# Patient Record
Sex: FEMALE | Race: WHITE | NOT HISPANIC OR LATINO | Employment: OTHER | URBAN - METROPOLITAN AREA
[De-identification: names, ages, dates, MRNs, and addresses within clinical notes are randomized per-mention and may not be internally consistent; named-entity substitution may affect disease eponyms.]

---

## 2017-03-02 ENCOUNTER — VIRTUAL VISIT (OUTPATIENT)
Dept: FAMILY MEDICINE | Facility: OTHER | Age: 32
End: 2017-03-02

## 2017-03-02 NOTE — PROGRESS NOTES
Date:   Clinician: Tre Leiva  Clinician NPI: [id number]  Patient: Petra Vega  Patient : 1985  Patient Address: [facility] [place] [address]  Patient Phone: [phone]  Visit Protocol: Yeast infection  Patient Summary:  Petra is a 31 year old ( : 1985 ) female who initiated a Zip for a presumed vaginal yeast infection.     0-5 days ago she began noticing vaginal discharge, vaginal pruritus, and perivulvar pruritus .   She denies having perivulvar rash, fever, and abdominal pain or lesions.  She has had one (1) occurrence in the past year and the current symptoms are similar to previous yeast infections. She has not tried to treat her current symptoms with any medication.   She has a more than normal amount of chunky (like cottage cheese), clear or white, thick, non-odorous discharge.   She is currently taking or has taken antibiotics in the past 2 weeks. She prefers a fluconazole (Diflucan) Pill.   She states she is not pregnant and denies breastfeeding. She has menstruated in the past month.   She denies risk factors for sexually transmitted infections. She does NOT smoke or use smokeless tobacco.    MEDICATIONS:   Ibuprofen (Advil, Motrin)   , ALLERGIES:    Patient free text response: Wellbuttrin    Clinician Response:  Dear Petra,  Based on the information you have provided, you likely have a vaginal yeast infection which is a common infection of the vagina caused by a fungus.   I am prescribing:   Fluconazole (Diflucan) 150 mg oral tablet to treat your yeast infection. Swallow one (1) tablet as a single dose. There are no refills with this prescription.   While you have yeast infection symptoms, do the following:      Avoid irritants such as scented bath products, tampons, pads, or vaginal sprays and powders.    Avoid douching.    Wear cotton underwear and if you are comfortable doing so, do not wear underwear to bed.    Avoid hot tubs and  raj dumas.     Most women notice improvement in their symptoms within 1-2 days after starting treatment with complete clearing in 5-7 days. If your symptoms have not improved in 3 days or not resolved in 10 days, please schedule an appointment to see your primary care clinician for an evaluation as your symptoms may be caused by an infection other than yeast. Sometimes yeast infections can be associated with other vaginal infections or with sexually transmitted infections (STIs). If you think you may be at risk for a STI please be seen in a clinic.   Diagnosis: Candida Vulvovaginitis  Diagnosis ICD: B37.3  Prescription: fluconazole (Diflucan) 150mg oral tablet 1 tablet, 1 days supply. Take one tablet by mouth one time a day for 1 day. Refills: 0, Refill as needed: no, Allow substitutions: yes  Prescription Sent At: March 02 08:08:58, 2017  Pharmacy: [id number] - [phone] - [address] [place] [address]

## 2017-03-14 ENCOUNTER — TELEPHONE (OUTPATIENT)
Dept: FAMILY MEDICINE | Facility: CLINIC | Age: 32
End: 2017-03-14

## 2017-03-14 DIAGNOSIS — N30.01 ACUTE CYSTITIS WITH HEMATURIA: Primary | ICD-10-CM

## 2017-03-14 RX ORDER — CIPROFLOXACIN 500 MG/1
500 TABLET, FILM COATED ORAL 2 TIMES DAILY
Qty: 6 TABLET | Refills: 0 | Status: SHIPPED | OUTPATIENT
Start: 2017-03-14 | End: 2017-04-06

## 2017-03-14 NOTE — TELEPHONE ENCOUNTER
Disc-will consider allergic to septra. [facility] is where she went. Will switch to cipro if the 2 doses aren't enough, but they may be

## 2017-03-14 NOTE — TELEPHONE ENCOUNTER
MP  Please advise in SN's absence  Per pt treated by KENNY morfin but no record found  Took 2 doses of Bactrim for UTI, after first dose last night having episodes of anxiety and felt like tongue was swelling  Stopped taking med.  Did you want to DB her this evening or do you want to send in different med?  Please advise.  Iram Alarcon RN

## 2017-03-14 NOTE — TELEPHONE ENCOUNTER
Reason for Call: Side effect    Detailed comments: Pt believes it is from bactrim which was prescribed   By Dr. Leiva through virtual visit.   Pt wants to know if there is something that she can take that is non  sulfate    Phone Number Patient can be reached at: Home number on file [phone] (home)    Best Time: anytime    Can we leave a detailed message on this number? YES    Call taken on 3/14/2017 at 2:56 PM by Shanique Mao

## 2017-04-06 ENCOUNTER — OFFICE VISIT (OUTPATIENT)
Dept: FAMILY MEDICINE | Facility: CLINIC | Age: 32
End: 2017-04-06
Payer: COMMERCIAL

## 2017-04-06 VITALS
HEART RATE: 81 BPM | BODY MASS INDEX: 23.34 KG/M2 | HEIGHT: 65 IN | SYSTOLIC BLOOD PRESSURE: 110 MMHG | OXYGEN SATURATION: 97 % | WEIGHT: 140.06 LBS | TEMPERATURE: 98.2 F | DIASTOLIC BLOOD PRESSURE: 70 MMHG

## 2017-04-06 DIAGNOSIS — G47.01 INSOMNIA DUE TO MEDICAL CONDITION: ICD-10-CM

## 2017-04-06 DIAGNOSIS — F41.1 GENERALIZED ANXIETY DISORDER: Primary | ICD-10-CM

## 2017-04-06 PROBLEM — F17.200 TOBACCO USE DISORDER: Status: ACTIVE | Noted: 2017-04-06

## 2017-04-06 PROCEDURE — 99214 OFFICE O/P EST MOD 30 MIN: CPT | Performed by: PHYSICIAN ASSISTANT

## 2017-04-06 RX ORDER — HYDROXYZINE HYDROCHLORIDE 25 MG/1
25-50 TABLET, FILM COATED ORAL
Qty: 30 TABLET | Refills: 3 | Status: SHIPPED | OUTPATIENT
Start: 2017-04-06 | End: 2021-06-25

## 2017-04-06 RX ORDER — ZOLPIDEM TARTRATE 5 MG/1
5 TABLET ORAL
Qty: 30 TABLET | Refills: 0 | Status: SHIPPED | OUTPATIENT
Start: 2017-04-06 | End: 2017-04-18

## 2017-04-06 NOTE — NURSING NOTE
"No chief complaint on file.    /70 (BP Location: Left arm, Patient Position: Left side, Cuff Size: Adult Regular)  Pulse 81  Temp 98.2  F (36.8  C) (Oral)  Ht 5' 5\" (1.651 m)  Wt 140 lb 1 oz (63.5 kg)  LMP 04/04/2017  SpO2 97%  Breastfeeding? No  BMI 23.31 kg/m2 Estimated body mass index is 23.31 kg/(m^2) as calculated from the following:    Height as of this encounter: 5' 5\" (1.651 m).    Weight as of this encounter: 140 lb 1 oz (63.5 kg).  bp completed using cuff size: regular      Health Maintenance addressed:  Pap Smear    Patient is aware to make px with pap                "

## 2017-04-06 NOTE — MR AVS SNAPSHOT
"              After Visit Summary   2017    Petra Vega    MRN: [id number]           Patient Information     Date Of Birth          1985        Visit Information        Provider Department      2017 1:00 PM Josemanuel Amato PA-C [facility]        Today's Diagnoses     Generalized anxiety disorder    -  1    Insomnia due to medical condition           Follow-ups after your visit        Who to contact     If you have questions or need follow up information about today's clinic visit or your schedule please contact [facility] directly at [phone].  Normal or non-critical lab and imaging results will be communicated to you by "[employer]hart, letter or phone within 4 business days after the clinic has received the results. If you do not hear from us within 7 days, please contact the clinic through "[employer]hart or phone. If you have a critical or abnormal lab result, we will notify you by phone as soon as possible.  Submit refill requests through [employer] or call your pharmacy and they will forward the refill request to us. Please allow 3 business days for your refill to be completed.          Additional Information About Your Visit        MyChart Information     [employer] lets you send messages to your doctor, view your test results, renew your prescriptions, schedule appointments and more. To sign up, go to [email or web address] . Click on \"Log in\" on the left side of the screen, which will take you to the Welcome page. Then click on \"Sign up Now\" on the right side of the page.     You will be asked to enter the access code listed below, as well as some personal information. Please follow the directions to create your username and password.     Your access code is: [email or web address]  Expires: 2017  1:33 PM     Your access code will  in 90 days. If you need help or a new code, please call your [facility] or [phone].        Care EveryWhere ID     This is your Care " "EveryWhere ID. This could be used by other organizations to access your [place] medical records  [id number]        Your Vitals Were     Pulse Temperature Height Last Period Pulse Oximetry Breastfeeding?    81 98.2  F (36.8  C) (Oral) 5' 5\" (1.651 m) 04/04/2017 97% No    BMI (Body Mass Index)                   23.31 kg/m2            Blood Pressure from Last 3 Encounters:   04/06/17 110/70   11/11/16 112/67   03/21/16 126/76    Weight from Last 3 Encounters:   04/06/17 140 lb 1 oz (63.5 kg)   11/11/16 140 lb 8 oz (63.7 kg)   03/21/16 148 lb 8 oz (67.4 kg)              Today, you had the following     No orders found for display         Today's Medication Changes          These changes are accurate as of: 4/6/17  1:33 PM.  If you have any questions, ask your nurse or doctor.               Start taking these medicines.        Dose/Directions    zolpidem 5 MG tablet   Commonly known as:  AMBIEN   Used for:  Insomnia due to medical condition   Started by:  Josemanuel Amato PA-C        Dose:  5 mg   Take 1 tablet (5 mg) by mouth nightly as needed for sleep   Quantity:  30 tablet   Refills:  0         Stop taking these medicines if you haven't already. Please contact your care team if you have questions.     LORazepam 0.5 MG tablet   Commonly known as:  ATIVAN   Stopped by:  Josemanuel Amato PA-C                Where to get your medicines      These medications were sent to [facility] [address] [place] [address] AT [address] [address]     Phone:  [phone]     hydrOXYzine 25 MG tablet         Some of these will need a paper prescription and others can be bought over the counter.  Ask your nurse if you have questions.     Bring a paper prescription for each of these medications     zolpidem 5 MG tablet                Primary Care Provider Office Phone # Fax #    Isela Clemons [phone]       [place] [address] " [address] [facility] [address]  [facility] [address]        Thank you!     Thank you for choosing [facility]  for your care. Our goal is always to provide you with excellent care. Hearing back from our patients is one way we can continue to improve our services. Please take a few minutes to complete the written survey that you may receive in the mail after your visit with us. Thank you!             Your Updated Medication List - Protect others around you: Learn how to safely use, store and throw away your medicines at www.disposemymeds.org.          This list is accurate as of: 4/6/17  1:33 PM.  Always use your most recent med list.                   Brand Name Dispense Instructions for use    hydrOXYzine 25 MG tablet    ATARAX    30 tablet    Take 1-2 tablets (25-50 mg) by mouth nightly as needed for anxiety At night       zolpidem 5 MG tablet    AMBIEN    30 tablet    Take 1 tablet (5 mg) by mouth nightly as needed for sleep

## 2017-04-06 NOTE — PROGRESS NOTES
"  SUBJECTIVE:                                                    Petra Vega is a 31 year old female who presents to clinic today for the following health issues:      Anxiety Follow-Up    Status since last visit: No change    Other associated symptoms: due to menses. PMDD    Complicating factors:   Significant life event: No   Current substance abuse: None  Depression symptoms: No  No flowsheet data found.     GAD7       Amount of exercise or physical activity: 4-5 days/week for an average of 45-60 minutes    Problems taking medications regularly: No    Medication side effects: none    Diet: regular (no restrictions)          Problem list and histories reviewed & adjusted, as indicated.  Additional history: 30 y/o female here for anxiety follow up.  She does take the vistaril around 3 times a week, without any side effects.  She also does have some breakthrough anxiety attacks, which does get worse around her period.  Does not even take weekly.  Most of her symptoms are around sleeping.  She no longer finds that the ativan helps, and when she takes she has to take at least 2 for any kind of sleep.    She has taken ambien several years ago, and remembers it working.      BP Readings from Last 3 Encounters:   04/06/17 110/70   11/11/16 112/67   03/21/16 126/76    Wt Readings from Last 3 Encounters:   04/06/17 140 lb 1 oz (63.5 kg)   11/11/16 140 lb 8 oz (63.7 kg)   03/21/16 148 lb 8 oz (67.4 kg)                    Reviewed and updated as needed this visit by clinical staff       Reviewed and updated as needed this visit by Provider         ROS:  Constitutional, HEENT, cardiovascular, pulmonary, gi and gu systems are negative, except as otherwise noted.    OBJECTIVE:                                                    /70 (BP Location: Left arm, Patient Position: Left side, Cuff Size: Adult Regular)  Pulse 81  Temp 98.2  F (36.8  C) (Oral)  Ht 5' 5\" (1.651 m)  Wt 140 lb 1 oz (63.5 kg)  LMP 04/04/2017 "  SpO2 97%  Breastfeeding? No  BMI 23.31 kg/m2  Body mass index is 23.31 kg/(m^2).  GENERAL: alert and no distress  EYES: Eyes grossly normal to inspection  PSYCH: mentation appears normal, affect normal/bright    Diagnostic Test Results:  none      ASSESSMENT/PLAN:                                                            1. Generalized anxiety disorder  Does seem to work quite well, no change made.  Continues to follow with counselor.    - hydrOXYzine (ATARAX) 25 MG tablet; Take 1-2 tablets (25-50 mg) by mouth nightly as needed for anxiety At night  Dispense: 30 tablet; Refill: 3    2. Insomnia due to medical condition  With her flare ups during her menstrual cycle, will trial ambien.  She has taken in the past and it did help, may be safer longer term option that benzos, plus they were no longer working at dose prescribed.  This should last her several months.    - zolpidem (AMBIEN) 5 MG tablet; Take 1 tablet (5 mg) by mouth nightly as needed for sleep  Dispense: 30 tablet; Refill: 0        Josemanuel Amato PA-C  [facility]

## 2017-04-13 ENCOUNTER — OFFICE VISIT (OUTPATIENT)
Dept: FAMILY MEDICINE | Facility: CLINIC | Age: 32
End: 2017-04-13
Payer: COMMERCIAL

## 2017-04-13 VITALS
DIASTOLIC BLOOD PRESSURE: 75 MMHG | TEMPERATURE: 98 F | SYSTOLIC BLOOD PRESSURE: 111 MMHG | BODY MASS INDEX: 22.84 KG/M2 | OXYGEN SATURATION: 100 % | WEIGHT: 137.1 LBS | HEIGHT: 65 IN | HEART RATE: 92 BPM

## 2017-04-13 DIAGNOSIS — F41.1 GAD (GENERALIZED ANXIETY DISORDER): Primary | ICD-10-CM

## 2017-04-13 DIAGNOSIS — G47.00 INSOMNIA, UNSPECIFIED TYPE: ICD-10-CM

## 2017-04-13 PROCEDURE — 99213 OFFICE O/P EST LOW 20 MIN: CPT | Performed by: PHYSICIAN ASSISTANT

## 2017-04-13 RX ORDER — LORAZEPAM 0.5 MG/1
0.5 TABLET ORAL EVERY 8 HOURS PRN
Qty: 20 TABLET | Refills: 0 | Status: SHIPPED | OUTPATIENT
Start: 2017-04-13 | End: 2021-06-25

## 2017-04-13 RX ORDER — MIRTAZAPINE 7.5 MG/1
7.5 TABLET, FILM COATED ORAL AT BEDTIME
Qty: 60 TABLET | Refills: 0 | Status: SHIPPED | OUTPATIENT
Start: 2017-04-13 | End: 2021-06-25

## 2017-04-13 ASSESSMENT — ANXIETY QUESTIONNAIRES
6. BECOMING EASILY ANNOYED OR IRRITABLE: MORE THAN HALF THE DAYS
5. BEING SO RESTLESS THAT IT IS HARD TO SIT STILL: MORE THAN HALF THE DAYS
3. WORRYING TOO MUCH ABOUT DIFFERENT THINGS: MORE THAN HALF THE DAYS
IF YOU CHECKED OFF ANY PROBLEMS ON THIS QUESTIONNAIRE, HOW DIFFICULT HAVE THESE PROBLEMS MADE IT FOR YOU TO DO YOUR WORK, TAKE CARE OF THINGS AT HOME, OR GET ALONG WITH OTHER PEOPLE: VERY DIFFICULT
GAD7 TOTAL SCORE: 14
7. FEELING AFRAID AS IF SOMETHING AWFUL MIGHT HAPPEN: MORE THAN HALF THE DAYS
2. NOT BEING ABLE TO STOP OR CONTROL WORRYING: MORE THAN HALF THE DAYS
1. FEELING NERVOUS, ANXIOUS, OR ON EDGE: MORE THAN HALF THE DAYS

## 2017-04-13 ASSESSMENT — PATIENT HEALTH QUESTIONNAIRE - PHQ9: 5. POOR APPETITE OR OVEREATING: MORE THAN HALF THE DAYS

## 2017-04-13 NOTE — MR AVS SNAPSHOT
"              After Visit Summary   2017    ePtra Vega    MRN: [id number]           Patient Information     Date Of Birth          1985        Visit Information        Provider Department      2017 1:20 PM Bella Alvarez PA-C [facility]        Today's Diagnoses     MICHELLE (generalized anxiety disorder)    -  1       Follow-ups after your visit        Who to contact     If you have questions or need follow up information about today's clinic visit or your schedule please contact [facility] directly at [phone].  Normal or non-critical lab and imaging results will be communicated to you by [employer], letter or phone within 4 business days after the clinic has received the results. If you do not hear from us within 7 days, please contact the clinic through [employer] or phone. If you have a critical or abnormal lab result, we will notify you by phone as soon as possible.  Submit refill requests through [employer] or call your pharmacy and they will forward the refill request to us. Please allow 3 business days for your refill to be completed.          Additional Information About Your Visit        MyChart Information     [employer]ets lets you send messages to your doctor, view your test results, renew your prescriptions, schedule appointments and more. To sign up, go to [email or web address] . Click on \"Log in\" on the left side of the screen, which will take you to the Welcome page. Then click on \"Sign up Now\" on the right side of the page.     You will be asked to enter the access code listed below, as well as some personal information. Please follow the directions to create your username and password.     Your access code is: [email or web address]  Expires: 2017  1:33 PM     Your access code will  in 90 days. If you need help or a new code, please call your [facility] or [phone].        Care EveryWhere ID     This is your Care EveryWhere ID. This could be used " "by other organizations to access your [place] medical records  [id number]        Your Vitals Were     Pulse Temperature Height Last Period Pulse Oximetry BMI (Body Mass Index)    92 98  F (36.7  C) (Oral) 5' 5\" (1.651 m) 04/04/2017 100% 22.81 kg/m2       Blood Pressure from Last 3 Encounters:   04/13/17 111/75   04/06/17 110/70   11/11/16 112/67    Weight from Last 3 Encounters:   04/13/17 137 lb 1.6 oz (62.2 kg)   04/06/17 140 lb 1 oz (63.5 kg)   11/11/16 140 lb 8 oz (63.7 kg)              Today, you had the following     No orders found for display         Today's Medication Changes          These changes are accurate as of: 4/13/17  2:07 PM.  If you have any questions, ask your nurse or doctor.               Start taking these medicines.        Dose/Directions    LORazepam 0.5 MG tablet   Commonly known as:  ATIVAN   Used for:  MICHELLE (generalized anxiety disorder)   Started by:  Bella Alvarez PA-C        Dose:  0.5 mg   Take 1 tablet (0.5 mg) by mouth every 8 hours as needed for anxiety   Quantity:  20 tablet   Refills:  0       mirtazapine 7.5 MG Tabs tablet   Commonly known as:  REMERON   Used for:  MICHELLE (generalized anxiety disorder)   Started by:  Bella Alvarez PA-C        Dose:  7.5 mg   Take 1 tablet (7.5 mg) by mouth At Bedtime   Quantity:  60 tablet   Refills:  0            Where to get your medicines      These medications were sent to [facility] [address] [place] [address] AT [address] [address]     Phone:  [phone]     mirtazapine 7.5 MG Tabs tablet         Some of these will need a paper prescription and others can be bought over the counter.  Ask your nurse if you have questions.     Bring a paper prescription for each of these medications     LORazepam 0.5 MG tablet                Primary Care Provider Office Phone # Fax #    Isela Clemons [phone]       [place] [address] [address] "   [facility] [address]        Thank you!     Thank you for choosing [facility]  for your care. Our goal is always to provide you with excellent care. Hearing back from our patients is one way we can continue to improve our services. Please take a few minutes to complete the written survey that you may receive in the mail after your visit with us. Thank you!             Your Updated Medication List - Protect others around you: Learn how to safely use, store and throw away your medicines at www.disposemymeds.org.          This list is accurate as of: 4/13/17  2:07 PM.  Always use your most recent med list.                   Brand Name Dispense Instructions for use    hydrOXYzine 25 MG tablet    ATARAX    30 tablet    Take 1-2 tablets (25-50 mg) by mouth nightly as needed for anxiety At night       LORazepam 0.5 MG tablet    ATIVAN    20 tablet    Take 1 tablet (0.5 mg) by mouth every 8 hours as needed for anxiety       mirtazapine 7.5 MG Tabs tablet    REMERON    60 tablet    Take 1 tablet (7.5 mg) by mouth At Bedtime       zolpidem 5 MG tablet    AMBIEN    30 tablet    Take 1 tablet (5 mg) by mouth nightly as needed for sleep

## 2017-04-13 NOTE — PROGRESS NOTES
SUBJECTIVE:                                                    Petra Vega is a 31 year old female who presents to clinic today for evaluation of anxiety. She has recently been prescribed Ambien for sleep and had taken it a couple of times. The last time she took it she woke up in the middle of the night and had a hallucination that she had a screen saver on her computer, when in reality her computer did not have screen saver. She is complaining of increased episodes of panic at work where she has had to leave because she cannot get her symptoms under control. She has left work 1-2 times per month. She is not interested in trying an SSRI because she has had poor reactions to them in the past. She does have a history of PMDD and has increased insomnia around her period.       Amount of exercise or physical activity: 4-5 days/week for an average of 45-60 minutes    Problems taking medications regularly: No    Medication side effects: none    Diet: regular (no restrictions)      Problem list and histories reviewed & adjusted, as indicated.  Additional history: as documented    Patient Active Problem List   Diagnosis     Generalized anxiety disorder     Chronic rhinitis     Intermittent asthma     CARDIOVASCULAR SCREENING; LDL GOAL LESS THAN 160     LSIL (low grade squamous intraepithelial lesion) on Pap smear     Closed head injury with loss of consciousness of unknown duration (H)     Fall     GERD (gastroesophageal reflux disease)     Pill esophagitis     Health Care Home     Dysmenorrhea     Tobacco use disorder     Past Surgical History:   Procedure Laterality Date     ESOPHAGOSCOPY, GASTROSCOPY, DUODENOSCOPY (EGD), COMBINED  3/20/2013    Procedure: COMBINED ESOPHAGOSCOPY, GASTROSCOPY, DUODENOSCOPY (EGD), BIOPSY SINGLE OR MULTIPLE;;  Surgeon: Steffen Bran MD;  Location:  GI       Social History   Substance Use Topics     Smoking status: Current Every Day Smoker     Packs/day: 0.20     Types: Cigarettes  "    Smokeless tobacco: Never Used     Alcohol use 0.0 oz/week     0 Standard drinks or equivalent per week      Comment: social     Family History   Problem Relation Age of Onset     Cardiovascular Father      Cardiovascular Maternal Grandmother      CEREBROVASCULAR DISEASE Maternal Grandmother      Allergies Maternal Grandmother      Cardiovascular Paternal Grandmother      Cardiovascular Maternal Grandfather      Cardiovascular Paternal Grandfather      Allergies Other      self     Allergies Mother      Depression Mother      Depression Sister      Depression Other      self     GASTROINTESTINAL DISEASE Other      self     Genitourinary Problems Other      self     Asthma Other      self         Current Outpatient Prescriptions   Medication     mirtazapine (REMERON) 7.5 MG TABS tablet     LORazepam (ATIVAN) 0.5 MG tablet     hydrOXYzine (ATARAX) 25 MG tablet     No current facility-administered medications for this visit.          Allergies   Allergen Reactions     Seasonal Allergies      Septra [Sulfamethoxazole W/Trimethoprim] Swelling     Sulfa Drugs      Sister had near fatal reaction     Wellbutrin [Bupropion Hcl]      Hallucination       Reviewed and updated as needed this visit by clinical staff       Reviewed and updated as needed this visit by Provider         ROS:  Constitutional, HEENT, cardiovascular, pulmonary, GI, , musculoskeletal, neuro, skin, endocrine and psych systems are negative, except as otherwise noted.    OBJECTIVE:                                                    /75  Pulse 92  Temp 98  F (36.7  C) (Oral)  Ht 5' 5\" (1.651 m)  Wt 137 lb 1.6 oz (62.2 kg)  LMP 04/04/2017  SpO2 100%  BMI 22.81 kg/m2  Body mass index is 22.81 kg/(m^2).  GENERAL: healthy, alert and no distress  NECK: no adenopathy, no asymmetry, masses, or scars and thyroid normal to palpation  RESP: lungs clear to auscultation - no rales, rhonchi or wheezes  CV: regular rate and rhythm, normal S1 S2, no S3 or " S4, no murmur, click or rub, no peripheral edema and peripheral pulses strong  ABDOMEN: soft, nontender, no hepatosplenomegaly, no masses and bowel sounds normal  MS: no gross musculoskeletal defects noted, no edema  PSYCH: mentation appears normal, affect normal/bright    Diagnostic Test Results:  none      ASSESSMENT/PLAN:                                                    1. MICHELLE (generalized anxiety disorder)  I am giving her a small supply of Ativan to take when she has panic at work. Spoke in DETAIL about Black Box warning associated with Ativan and rebound effect of anxiety the next day, also the effect of needing more medication to achieve the same effect. She should use very sparingly and only for panic that is not able to be controlled with supportive cares alone.   - mirtazapine (REMERON) 7.5 MG TABS tablet; Take 1 tablet (7.5 mg) by mouth At Bedtime  Dispense: 60 tablet; Refill: 0  - LORazepam (ATIVAN) 0.5 MG tablet; Take 1 tablet (0.5 mg) by mouth every 8 hours as needed for anxiety  Dispense: 20 tablet; Refill: 0    2. Insomnia, unspecified type  Will try Remeron for sleep in place of Ambien. Mirtazapine can be increased to 2 tabs if one tab does not achieve drowsiness.    I have discussed the patient's diagnosis and my plan of treatment with the patient. We went over any labs or imaging. Patient is aware to come back in with worsening symptoms or if no relief despite treatment plan.  Patient verbalizes understanding. All questions were addressed and answered.     Bella Alvarez PA-C  [facility]

## 2017-04-13 NOTE — NURSING NOTE
"Chief Complaint   Patient presents with     Recheck Medication      Ambien     /75  Pulse 92  Temp 98  F (36.7  C) (Oral)  Ht 5' 5\" (1.651 m)  Wt 137 lb 1.6 oz (62.2 kg)  LMP 04/04/2017  SpO2 100%  BMI 22.81 kg/m2 Estimated body mass index is 22.81 kg/(m^2) as calculated from the following:    Height as of this encounter: 5' 5\" (1.651 m).    Weight as of this encounter: 137 lb 1.6 oz (62.2 kg).  BP completed using cuff size: regular       Health Maintenance due pending provider review:  Pap Smear    PAP--Possibly completing today, per Provider review      Janis Rodriguez CMA  "

## 2017-04-14 ASSESSMENT — ANXIETY QUESTIONNAIRES: GAD7 TOTAL SCORE: 14

## 2017-09-19 ENCOUNTER — TELEPHONE (OUTPATIENT)
Dept: FAMILY MEDICINE | Facility: CLINIC | Age: 32
End: 2017-09-19

## 2017-09-24 ENCOUNTER — HEALTH MAINTENANCE LETTER (OUTPATIENT)
Age: 32
End: 2017-09-24

## 2018-01-25 ENCOUNTER — OFFICE VISIT (OUTPATIENT)
Dept: FAMILY MEDICINE | Facility: CLINIC | Age: 33
End: 2018-01-25
Payer: COMMERCIAL

## 2018-01-25 VITALS
SYSTOLIC BLOOD PRESSURE: 102 MMHG | TEMPERATURE: 98.3 F | DIASTOLIC BLOOD PRESSURE: 64 MMHG | WEIGHT: 154.6 LBS | OXYGEN SATURATION: 100 % | HEART RATE: 102 BPM | BODY MASS INDEX: 25.76 KG/M2 | HEIGHT: 65 IN

## 2018-01-25 DIAGNOSIS — R10.2 PELVIC PAIN IN FEMALE: Primary | ICD-10-CM

## 2018-01-25 LAB
SPECIMEN SOURCE: NORMAL
WET PREP SPEC: NORMAL

## 2018-01-25 PROCEDURE — 87491 CHLMYD TRACH DNA AMP PROBE: CPT | Performed by: FAMILY MEDICINE

## 2018-01-25 PROCEDURE — 87591 N.GONORRHOEAE DNA AMP PROB: CPT | Performed by: FAMILY MEDICINE

## 2018-01-25 PROCEDURE — 99214 OFFICE O/P EST MOD 30 MIN: CPT | Performed by: FAMILY MEDICINE

## 2018-01-25 PROCEDURE — 87210 SMEAR WET MOUNT SALINE/INK: CPT | Performed by: FAMILY MEDICINE

## 2018-01-25 RX ORDER — DOXYCYCLINE 100 MG/1
100 CAPSULE ORAL 2 TIMES DAILY
Qty: 20 CAPSULE | Refills: 0 | Status: SHIPPED | OUTPATIENT
Start: 2018-01-25 | End: 2018-02-04

## 2018-01-25 RX ORDER — HYDROCODONE BITARTRATE AND ACETAMINOPHEN 5; 325 MG/1; MG/1
1 TABLET ORAL 2 TIMES DAILY PRN
Qty: 10 TABLET | Refills: 0 | Status: SHIPPED | OUTPATIENT
Start: 2018-01-25 | End: 2021-06-25

## 2018-01-25 NOTE — PROGRESS NOTES
"  SUBJECTIVE:   Petra Vega is a 32 year old female who presents to clinic today for the following health issues:      ***    {additional problems for provider to add:828104}    Problem list and histories reviewed & adjusted, as indicated.  Additional history: {NONE - AS DOCUMENTED:056466::\"as documented\"}    {HIST REVIEW/ LINKS 2:245279}    Reviewed and updated as needed this visit by clinical staff       Reviewed and updated as needed this visit by Provider         {PROVIDER CHARTING PREFERENCE:439295}    "

## 2018-01-25 NOTE — NURSING NOTE
"Chief Complaint   Patient presents with     Cyst     Bartholin cyst     /64  Pulse 102  Temp 98.3  F (36.8  C) (Tympanic)  Ht 5' 5\" (1.651 m)  Wt 154 lb 9.6 oz (70.1 kg)  LMP 01/18/2018  SpO2 100%  BMI 25.73 kg/m2 Estimated body mass index is 25.73 kg/(m^2) as calculated from the following:    Height as of this encounter: 5' 5\" (1.651 m).    Weight as of this encounter: 154 lb 9.6 oz (70.1 kg).  Medication Reconciliation: complete      Health Maintenance due pending provider review:  NONE    n/a    Roxi Gaspar CMA  "

## 2018-01-25 NOTE — PROGRESS NOTES
SUBJECTIVE:   Petra Vega is a 32 year old female who presents to clinic today for the following health issues:      ED/UC Followup:    Facility:  [facility]  Date of visit: 01/16/2018  Reason for visit: vaginal pain  Current Status: pain continues, no improvement     Patient reports have left sided vaginal pain for the past 3 weeks. Pain started by feeling like she had been kicked in her vagina. Noticed pain was worse after standing on her feet for work. Pain is achy in nature, at rest 4/10 and after standing or working 10/10. Patient was unable to localize the pain when it first started, but was always left sided, in lower vagina. Around 1/16 patient started have fevers to 101 accompanied by feeling ill. Was seen at [facility] and told she had a Bartholin Cyst and was treated with Cephalexin, Norco and Fluconazole for concerns for a yeast infection after abx. Patient has not had this pain before.     Patients fevers subsided, but pain never improved. Patient reports 10/10 pain that feels like a bruise at the end of her work day. Has tried sits baths at least once a day. Patient has also been using a heating pad and warm towel. None of these have helped. Patient has been using her pain medication after work to help with the pain, which she says doesn't take away the pain, but does give her some relief.     She has not noticed any drainage or discharge. Last period was last week, and shorter than normal for the patient. Not associated with painful cramping or foul discharge. Has not been sexually active since pain developed. Has a stable partner for 1 yr, denies STD concerns. In the past yr to 6 months patients periods have become more irregular, and associated with a yeast infection before her period and BV like symptoms afterward with smelly discharge for a few days. Patient cannot associate changes with any other changes in life. Is not on OCPs and does not have and IUD in place.         Problem list  and histories reviewed & adjusted, as indicated.  Additional history: as documented    Patient Active Problem List   Diagnosis     Generalized anxiety disorder     Chronic rhinitis     Intermittent asthma     CARDIOVASCULAR SCREENING; LDL GOAL LESS THAN 160     LSIL (low grade squamous intraepithelial lesion) on Pap smear     Closed head injury with loss of consciousness of unknown duration (H)     Fall     GERD (gastroesophageal reflux disease)     Pill esophagitis     Health Care Home     Dysmenorrhea     Tobacco use disorder     Past Surgical History:   Procedure Laterality Date     ESOPHAGOSCOPY, GASTROSCOPY, DUODENOSCOPY (EGD), COMBINED  3/20/2013    Procedure: COMBINED ESOPHAGOSCOPY, GASTROSCOPY, DUODENOSCOPY (EGD), BIOPSY SINGLE OR MULTIPLE;;  Surgeon: Steffen Bran MD;  Location:  GI       Social History   Substance Use Topics     Smoking status: Current Every Day Smoker     Packs/day: 0.20     Types: Cigarettes     Smokeless tobacco: Never Used     Alcohol use 0.0 oz/week     0 Standard drinks or equivalent per week      Comment: social     Family History   Problem Relation Age of Onset     Cardiovascular Father      Cardiovascular Maternal Grandmother      CEREBROVASCULAR DISEASE Maternal Grandmother      Allergies Maternal Grandmother      Cardiovascular Paternal Grandmother      Cardiovascular Maternal Grandfather      Cardiovascular Paternal Grandfather      Allergies Other      self     Allergies Mother      Depression Mother      Depression Sister      Depression Other      self     GASTROINTESTINAL DISEASE Other      self     Genitourinary Problems Other      self     Asthma Other      self         Current Outpatient Prescriptions   Medication Sig Dispense Refill     HYDROcodone-acetaminophen (NORCO) 5-325 MG per tablet Take 1 tablet by mouth 2 times daily as needed for pain maximum 2 tablet(s) per day 10 tablet 0     doxycycline (VIBRAMYCIN) 100 MG capsule Take 1 capsule (100 mg) by mouth 2 times  "daily for 10 days 20 capsule 0     mirtazapine (REMERON) 7.5 MG TABS tablet Take 1 tablet (7.5 mg) by mouth At Bedtime (Patient not taking: Reported on 1/25/2018) 60 tablet 0     LORazepam (ATIVAN) 0.5 MG tablet Take 1 tablet (0.5 mg) by mouth every 8 hours as needed for anxiety (Patient not taking: Reported on 1/25/2018) 20 tablet 0     hydrOXYzine (ATARAX) 25 MG tablet Take 1-2 tablets (25-50 mg) by mouth nightly as needed for anxiety At night (Patient not taking: Reported on 1/25/2018) 30 tablet 3       Reviewed and updated as needed this visit by clinical staff  Tobacco  Allergies  Meds  Problems  Med Hx  Surg Hx  Fam Hx  Soc Hx        Reviewed and updated as needed this visit by Provider  Problems         ROS:  CONSTITUTIONAL:NEGATIVE for fever, chills, change in weight  GI: NEGATIVE for constipation, diarrhea, nausea, poor appetite and vomiting  : Positive for vaginal pain. Negative for vaginal discharge, dysuria, foul smell, dysparunia     OBJECTIVE:                                                    /64  Pulse 102  Temp 98.3  F (36.8  C) (Tympanic)  Ht 5' 5\" (1.651 m)  Wt 154 lb 9.6 oz (70.1 kg)  LMP 01/18/2018  SpO2 100%  BMI 25.73 kg/m2  Body mass index is 25.73 kg/(m^2).  GENERAL APPEARANCE: healthy, alert and no distress  RESP: Breathing comfortably, no acute distress   (female): normal cervix, adnexae, and uterus without masses. No erythema or induration of vaginal vault noted on speculum exam. Scant clear/white discharge. Outer labia and genitalia appear normal. No erythema or discharge present. Bimanual exam without cervical motion tenderness. No obvious swelling, induration or cystic formation on left vaginal wall, mild tenderness to palpation on left side.     Diagnostic test results:  Diagnostic Test Results:  No results found for this or any previous visit (from the past 24 hour(s)).     ASSESSMENT/PLAN:                                                    1. Pelvic pain in " female  Patient with previously diagnosed Bartholin Cyst at [facility] ED 1/16/18, now seen in clinic with 3 weeks of ongoing vaginal pain and discomfort. On physical exam no obvious bartholin cyst or mass appreciated. This may indicate partial or ongoing resolution of cyst formation. However, cannot exclude other causes including BV, STD or PID or referred pain from ovaries or uterus. Wet prep and STD screen today in clinic. Will refer to OB/GYN for pelvic us and consultation for possibe I&D if indicated.  Encouraged patient to continue with sits baths and warm hot compresses. Will also treat with doxycycline for 10 days in case of ongoing infection as well as provided patient with short term Norco for pain relief while waiting to see OB/GYN.   - US Pelvic Complete w Transvaginal; Future  - NEISSERIA GONORRHOEA PCR  - CHLAMYDIA TRACHOMATIS PCR  - Wet prep  - HYDROcodone-acetaminophen (NORCO) 5-325 MG per tablet; Take 1 tablet by mouth 2 times daily as needed for pain maximum 2 tablet(s) per day  Dispense: 10 tablet; Refill: 0  - doxycycline (VIBRAMYCIN) 100 MG capsule; Take 1 capsule (100 mg) by mouth 2 times daily for 10 days  Dispense: 20 capsule; Refill: 0      Follow up with OB/GYN provider following pelvic US    Isela Frazier MS4  The medical student has acted as my scribe.  I have completed all components of the history, physical exam and assessment and plan and agree with the note as documented.      Isela Clemons MD  [facility]

## 2018-01-25 NOTE — MR AVS SNAPSHOT
"              After Visit Summary   2018    Petra Vega    MRN: [id number]           Patient Information     Date Of Birth          1985        Visit Information        Provider Department      2018 2:00 PM Isela Clemons MD [facility]        Today's Diagnoses     Pelvic pain in female    -  1       Follow-ups after your visit        Future tests that were ordered for you today     Open Future Orders        Priority Expected Expires Ordered    US Pelvic Complete w Transvaginal Routine  2019            Who to contact     If you have questions or need follow up information about today's clinic visit or your schedule please contact [facility] directly at [phone].  Normal or non-critical lab and imaging results will be communicated to you by MyChart, letter or phone within 4 business days after the clinic has received the results. If you do not hear from us within 7 days, please contact the clinic through [employer] or phone. If you have a critical or abnormal lab result, we will notify you by phone as soon as possible.  Submit refill requests through [employer] or call your pharmacy and they will forward the refill request to us. Please allow 3 business days for your refill to be completed.          Additional Information About Your Visit        [employer] Information     [employer] lets you send messages to your doctor, view your test results, renew your prescriptions, schedule appointments and more. To sign up, go to [email or web address] . Click on \"Log in\" on the left side of the screen, which will take you to the Welcome page. Then click on \"Sign up Now\" on the right side of the page.     You will be asked to enter the access code listed below, as well as some personal information. Please follow the directions to create your username and password.     Your access code is: [email or web address]  Expires: 2018  3:01 PM     Your access code will  in " "90 days. If you need help or a new code, please call your [place] clinic or [phone].        Care EveryWhere ID     This is your Care EveryWhere ID. This could be used by other organizations to access your [place] medical records  [id number]        Your Vitals Were     Pulse Temperature Height Last Period Pulse Oximetry BMI (Body Mass Index)    102 98.3  F (36.8  C) (Tympanic) 5' 5\" (1.651 m) 01/18/2018 100% 25.73 kg/m2       Blood Pressure from Last 3 Encounters:   01/25/18 102/64   04/13/17 111/75   04/06/17 110/70    Weight from Last 3 Encounters:   01/25/18 154 lb 9.6 oz (70.1 kg)   04/13/17 137 lb 1.6 oz (62.2 kg)   04/06/17 140 lb 1 oz (63.5 kg)              We Performed the Following     CHLAMYDIA TRACHOMATIS PCR     NEISSERIA GONORRHOEA PCR     Wet prep        Primary Care Provider Office Phone # Fax #    Isela Clemons [phone] [address] [address]        Equal Access to Services     [facility]: Hadii aad ku hadasho Sonailaali, waaxda luqadaha, qaybta kaalmada adeflorada, kvng zuleta . So [facility] [phone].    ATENCIÓN: Si habla español, tiene a gold disposición servicios gratuitos de asistencia lingüística. [facility] [phone].    We comply with applicable federal civil rights laws and [place] laws. We do not discriminate on the basis of race, color, national origin, age, disability, sex, sexual orientation, or gender identity.            Thank you!     Thank you for choosing [facility]  for your care. Our goal is always to provide you with excellent care. Hearing back from our patients is one way we can continue to improve our services. Please take a few minutes to complete the written survey that you may receive in the mail after your visit with us. Thank you!             Your Updated Medication List - Protect others around you: Learn how to safely use, store and throw away your medicines at " www.disposemymeds.org.          This list is accurate as of 1/25/18  3:01 PM.  Always use your most recent med list.                   Brand Name Dispense Instructions for use Diagnosis    hydrOXYzine 25 MG tablet    ATARAX    30 tablet    Take 1-2 tablets (25-50 mg) by mouth nightly as needed for anxiety At night    Generalized anxiety disorder       LORazepam 0.5 MG tablet    ATIVAN    20 tablet    Take 1 tablet (0.5 mg) by mouth every 8 hours as needed for anxiety    MICHELLE (generalized anxiety disorder)       mirtazapine 7.5 MG Tabs tablet    REMERON    60 tablet    Take 1 tablet (7.5 mg) by mouth At Bedtime    MICHELLE (generalized anxiety disorder)

## 2018-01-28 LAB
C TRACH DNA SPEC QL NAA+PROBE: NEGATIVE
N GONORRHOEA DNA SPEC QL NAA+PROBE: NEGATIVE
SPECIMEN SOURCE: NORMAL
SPECIMEN SOURCE: NORMAL

## 2018-02-07 ENCOUNTER — TELEPHONE (OUTPATIENT)
Dept: FAMILY MEDICINE | Facility: CLINIC | Age: 33
End: 2018-02-07

## 2018-02-07 DIAGNOSIS — R10.2 PELVIC PAIN IN FEMALE: ICD-10-CM

## 2018-02-07 RX ORDER — HYDROCODONE BITARTRATE AND ACETAMINOPHEN 5; 325 MG/1; MG/1
1 TABLET ORAL 2 TIMES DAILY PRN
Qty: 2 TABLET | Refills: 0 | Status: CANCELLED | OUTPATIENT
Start: 2018-02-07

## 2018-02-07 NOTE — TELEPHONE ENCOUNTER
Reason for Call:  Other prescription    Detailed comments:  Patient is having pelvic pain and scheduled for internal ultrasound. She had an rx for vicodine, and  is requesting rx for 2 more vicodin to get through the test.     Phone Number Patient can be reached at: Cell number on file:    Telephone Information:   Mobile [phone]     Best Time: any    Can we leave a detailed message on this number? YES    Call taken on 2/7/2018 at 1:32 PM by Tiesha Hayward

## 2018-02-08 ENCOUNTER — OFFICE VISIT (OUTPATIENT)
Dept: OBGYN | Facility: CLINIC | Age: 33
End: 2018-02-08
Attending: FAMILY MEDICINE
Payer: COMMERCIAL

## 2018-02-08 ENCOUNTER — RADIANT APPOINTMENT (OUTPATIENT)
Dept: ULTRASOUND IMAGING | Facility: CLINIC | Age: 33
End: 2018-02-08
Attending: FAMILY MEDICINE
Payer: COMMERCIAL

## 2018-02-08 VITALS
HEART RATE: 78 BPM | BODY MASS INDEX: 25.46 KG/M2 | TEMPERATURE: 98.2 F | SYSTOLIC BLOOD PRESSURE: 111 MMHG | WEIGHT: 153 LBS | DIASTOLIC BLOOD PRESSURE: 59 MMHG

## 2018-02-08 DIAGNOSIS — R10.2 PELVIC PAIN: Primary | ICD-10-CM

## 2018-02-08 DIAGNOSIS — R10.2 PELVIC PAIN IN FEMALE: ICD-10-CM

## 2018-02-08 PROCEDURE — 76830 TRANSVAGINAL US NON-OB: CPT | Performed by: OBSTETRICS & GYNECOLOGY

## 2018-02-08 PROCEDURE — 99203 OFFICE O/P NEW LOW 30 MIN: CPT | Performed by: OBSTETRICS & GYNECOLOGY

## 2018-02-08 NOTE — PROGRESS NOTES
"GYN Clinic Consultation    Date of visit: 2018     Chief Complaint: pelvic pain    HPI:   Petra Vega is a 32 year old  female who I was asked to see in consultation by Isela Clemons for evaluation of pelvic pain. She complains of left sided vaginal/pelvic pain.    Location: lower vagina/pelvis on left side  Quality: achy  Severity:  4/10 at rest and 10/10 after standing/working  Duration:  1.5months  Associated signs/symptoms: on , had fevers to 101.  Went to ED and diagnosed with Bartholin cyst.  Rx for Keflex, fluconazole and norco    Hurts more when lays on right side.  Finds that she's able to sleep better if she puts a few pillows between her legs and lays on her left side.    Doesn't seem to be related to intercourse.  Had one day after intercourse when she was pain free, but also had day after intercourse when it was the worst it's been.    No drainage or discharge from the area.  No new partners or concerns for STI.  Negative STI testing and wet prep on 18.      Obstetric History:   Obstetric History       T0      L0     SAB0   TAB0   Ectopic0   Multiple0   Live Births0       # Outcome Date GA Lbr Luis Felipe/2nd Weight Sex Delivery Anes PTL Lv   1 AB      TAB           Obstetric history significant for:   1 TAB, medical    Gynecologic History:  Menarche: 14  Menses: occur every 25-45 days lasting 3 days in duration.  (heavy for 2 days, then barely there)   Patient's last menstrual period was 2018.  STI history: chlamydia.  Tested in 2018, negative  Last Pap: 3/21/2016, NIL  History of abnormal pap: LSIL on 2011.    History of cervical procedures: no   Contraceptive History: \"tried everything\" but really sensitive to prescriptions  The patient is sexually active with male partner.   She has the following concerns about sexual function: none.   She reports she is satisfied in her relationship.     Past Medical History:  Past Medical History: "   Diagnosis Date     Anxiety      Asthma      Depressive disorder      LSIL (low grade squamous intraepithelial lesion) on Pap smear 9/2011    noncompliant with colp       Past Surgical History:  Past Surgical History:   Procedure Laterality Date     APPENDECTOMY       ESOPHAGOSCOPY, GASTROSCOPY, DUODENOSCOPY (EGD), COMBINED  3/20/2013    Procedure: COMBINED ESOPHAGOSCOPY, GASTROSCOPY, DUODENOSCOPY (EGD), BIOPSY SINGLE OR MULTIPLE;;  Surgeon: Steffen Bran MD;  Location:  GI         Medications:  Current Outpatient Prescriptions   Medication     HYDROcodone-acetaminophen (NORCO) 5-325 MG per tablet     mirtazapine (REMERON) 7.5 MG TABS tablet     LORazepam (ATIVAN) 0.5 MG tablet     hydrOXYzine (ATARAX) 25 MG tablet     No current facility-administered medications for this visit.        Allergy:  Allergies   Allergen Reactions     Seasonal Allergies      Septra [Sulfamethoxazole W/Trimethoprim] Swelling     Sulfa Drugs      Sister had near fatal reaction     Wellbutrin [Bupropion Hcl]      Hallucination       Social History:  Tobacco use: yes, less than 1/4ppd  Alcohol use: social  Recreational drug use: no  Relationship status is: in relationship.    History of sexual, physical or mental abuse denies.   She feels safe in her current relationship.    Family History   Problem Relation Age of Onset     Cardiovascular Father      Cardiovascular Maternal Grandmother      CEREBROVASCULAR DISEASE Maternal Grandmother      Allergies Maternal Grandmother      Cardiovascular Paternal Grandmother      Cardiovascular Maternal Grandfather      Cardiovascular Paternal Grandfather      Allergies Mother      Depression Mother      Depression Sister      Denies hx of  Breast, ovarian, or colon cancer, sudden death, early cardiovascular or thomboembolic disease. Denies hx or congenital anomalies and autoimmune diseases.    Review of Systems:  Skin: negative for, lumps or bumps  Eyes: negative for, double vision,  glasses  Ears/Nose/Throat: negative for, hearing loss, deafness  Respiratory: No shortness of breath, dyspnea on exertion, cough, or hemoptysis  Cardiovascular: negative for, palpitations, tachycardia, irregular heart beat and chest pain  Gastrointestinal: negative for, nausea, vomiting, heartburn, hemorrhoids, melena, hematochezia, constipation and diarrhea  Genitourinary: negative for, dysuria, frequency, urgency, hematuria, urinary tract infection, sexually transmitted disease, vaginal discharge and dysparunia  Musculoskeletal: negative for and muscular weakness  Neurologic: negative for, syncope, stroke, seizures and paralysis  Psychiatric: positive for negative and anxiety, negative for, depression and sexual difficulties  Hematologic/Lymphatic/Immunologic: negative for and bleeding disorder  Endocrine: negative for, thyroid disorder and diabetes    Physical Exam:  Vitals:    02/08/18 0831   BP: 111/59   Pulse: 78   Temp: 98.2  F (36.8  C)   TempSrc: Oral   Weight: 153 lb (69.4 kg)     Body mass index is 25.46 kg/(m^2).    Gen: healthy, alert, active, no distress  Psych:  Appropriate mood and affect.    Neuro:  Gait WNL.  Sensation and strength grossly intact  Neck: supple, no adenopathy, normal thyroid  CV: regular rate and rhythm  Resp: lungs clear to ascultation bilaterally  Breast: deferred  Abd: soft, non-tender, non-distended, no masses, no hernias, healed laparoscopy scars on left abdomen.  Extremities: nontender, no edema  :   - external genitalia: vulva and perineum are normal without lesion, mass or erythema  - urethra: well supported urethra, no hypermobility, normal Skenes and Bartholins.   - bladder: no tenderness, no masses  - vagina: intact, rugated mucosa without lesions or abnormal discharge. No prolapse.   - uterus: normal size anteverted, no masses or tenderness  - adnexa: no masses or tenderness  - rectal: without hemorrhoids, masses, tenderness or blood  - tenderness to palpation of the  soft tissue in the area lateral to the left labia majora, near the left gluteal fold.  No masses appreciated and no point tenderness.  No tenderness to palpation of the same area on the right side.    Reviewed US images with patient.  Small simple ovarian cyst on left side, likely physiologic.  No reason to believe this plays a role in her pain.    Assessment:  Petra Vega is a 32 year old  who presents in consultation for pelvic pain.     Plan:  -No tenderness to musculature supporting vagina, but tenderness is directly adjacent to vagina.  No evidence of Bartholin or any other cyst.  No masses or hernias appreciated      - Unclear etiology of her pain, likely not gyn in origin given normal US findings.  Discussed this with patient, as well as possibility of pelvic floor PT.  While I've had patients who had good success with PFPT for tenderness to muscles in vagina, I've never referred someone with pain quite like hers.  Feel it is reasonable to try, given the impact her pain is having on her life.  She is agreeable and interested in trying PT.    RTC prn.    Isela Esparza MD.

## 2018-02-08 NOTE — TELEPHONE ENCOUNTER
"SN - FYI  Called pt back. was crying, because she was having so much pain, rating pain \"12 out of 10\". Doesn't know what to do, had just seen GYN and had US and they did not find anything and cannot explain her symptoms. Says her pain gets so severe it's difficult to walk. Advised if it is that bad, should go to the ER and will pass this along as a FYI.   Thank you,  Iram Alarcon RN      Doesn't need [facility] refill any longer.   "

## 2018-02-08 NOTE — TELEPHONE ENCOUNTER
Appointment scheduled for today at 7:30 am.  VM left asking patient to call back.  Yuki Traore RN

## 2018-02-08 NOTE — NURSING NOTE
"Chief Complaint   Patient presents with     RECHECK     Ultrasound       Initial /59  Pulse 78  Temp 98.2  F (36.8  C) (Oral)  Wt 153 lb (69.4 kg)  LMP 01/18/2018  BMI 25.46 kg/m2 Estimated body mass index is 25.46 kg/(m^2) as calculated from the following:    Height as of 1/25/18: 5' 5\" (1.651 m).    Weight as of this encounter: 153 lb (69.4 kg).  BP completed using cuff size: regular    No obstetric history on file.    The following HM Due: NONE      The following patient reported/Care Every where data was sent to:  P ABSTRACT QUALITY INITIATIVES [[id number]]  THEODORE Kathleen           "

## 2018-02-08 NOTE — PATIENT INSTRUCTIONS
Physical Therapy, Hand Therapy and Chiropractic Care are available through:    *[facility]  *[facility]  *[facility]    Call one number to schedule at any of the above locations: [phone].    Your provider has referred you to: Physical Therapy at [facility] or [facility]

## 2018-02-08 NOTE — TELEPHONE ENCOUNTER
Reason for Call:  Other returning call    Detailed comments: Pt is returning a nurses call this morning and I could not get a hold of a nurse. Please give pt a call back ASAP. Thank you.    Phone Number Patient can be reached at: Home number on file [phone] (home)    Best Time:     Can we leave a detailed message on this number? YES    Call taken on 2/8/2018 at 9:27 AM by Maria Luisa Oneill

## 2018-02-08 NOTE — Clinical Note
I saw this patient today for vaginal/pelvic pain.  She had an essentially normal US and her pain is actually lateral to her vagina on the left side, almost in the left gluteal fold anteriorly.  Likely not gyn in origin, for better or worse.  I did refer her for pelvic floor physical therapy, but am not entirely sure where it's coming from.    Thank you for the referral. Isela Esparza MD

## 2018-02-08 NOTE — MR AVS SNAPSHOT
After Visit Summary   2/8/2018    Petra Vega    MRN: [id number]           Patient Information     Date Of Birth          1985        Visit Information        Provider Department      2/8/2018 8:30 AM Isela Esparza MD [facility]        Today's Diagnoses     Pain in joint, pelvic region and thigh, left    -  1      Care Instructions    Physical Therapy, Hand Therapy and Chiropractic Care are available through:    *[facility]  *[facility]  *[facility]    Call one number to schedule at any of the above locations: [phone].    Your provider has referred you to: Physical Therapy at [facility] or [facility]            Follow-ups after your visit        Additional Services     [facility] PT, HAND, AND CHIROPRACTIC REFERRAL       **This order will print in the [facility] Scheduling Office**    Physical Therapy, Hand Therapy and Chiropractic Care are available through:    *[facility]  *[facility]  *[facility]    Call one number to schedule at any of the above locations: [phone].    Your provider has referred you to: Physical Therapy at [facility] or [facility]    Indication/Reason for Referral: Women's Health (Please Complete Special Programs SmartList)  Onset of Illness: 1.5 months ago  Therapy Orders: Evaluate and Treat  Special Programs: None and Women's Health: OB/GYN Musculoskeletal Dysfunction: Pelvic pain, lateral to left labia majora, medial to left thigh  Special Request: None    Eliud Kline      Additional Comments for the Therapist or Chiropractor: none    Please be aware that coverage of these services is subject to the terms and limitations of your health insurance plan.  Call member services at your health plan with any benefit or coverage questions.      Please bring the following to your appointment:    *Your personal calendar for scheduling future  "appointments  *Comfortable clothing                  Who to contact     If you have questions or need follow up information about today's clinic visit or your schedule please contact [facility] directly at [phone].  Normal or non-critical lab and imaging results will be communicated to you by MyChart, letter or phone within 4 business days after the clinic has received the results. If you do not hear from us within 7 days, please contact the clinic through MyChart or phone. If you have a critical or abnormal lab result, we will notify you by phone as soon as possible.  Submit refill requests through [employer] or call your pharmacy and they will forward the refill request to us. Please allow 3 business days for your refill to be completed.          Additional Information About Your Visit        [facility] Information     [employer] lets you send messages to your doctor, view your test results, renew your prescriptions, schedule appointments and more. To sign up, go to [email or web address] . Click on \"Log in\" on the left side of the screen, which will take you to the Welcome page. Then click on \"Sign up Now\" on the right side of the page.     You will be asked to enter the access code listed below, as well as some personal information. Please follow the directions to create your username and password.     Your access code is: [email or web address]  Expires: 2018  3:01 PM     Your access code will  in 90 days. If you need help or a new code, please call your [facility] clinic or [phone].        Care EveryWhere ID     This is your Care EveryWhere ID. This could be used by other organizations to access your [facility] medical records  [id number]        Your Vitals Were     Pulse Temperature Last Period BMI (Body Mass Index)          78 98.2  F (36.8  C) (Oral) 2018 25.46 kg/m2         Blood Pressure from Last 3 Encounters:   18 111/59   18 102/64   17 111/75    Weight from " Last 3 Encounters:   02/08/18 153 lb (69.4 kg)   01/25/18 154 lb 9.6 oz (70.1 kg)   04/13/17 137 lb 1.6 oz (62.2 kg)              We Performed the Following     [facility], HAND, AND CHIR[facility]RACTIC REFERRAL        Primary Care Provider Office Phone # Fax #    LivoniaCrissy Clemons [phone]       [address] [address]        Equal Access to Services     VELVET GONZALEZ : Hadii aad ku hadasho Soomaali, waaxda luqadaha, qaybta kaalmada adeegyada, waxay idiin hayaan adeeg nalini zuleta . So [facility] [phone].    ATENCIÓN: Si habla español, tiene a gold disposición servicios gratuitos de asistencia lingüística. [facility] [phone].    We comply with applicable federal civil rights laws and [place] laws. We do not discriminate on the basis of race, color, national origin, age, disability, sex, sexual orientation, or gender identity.            Thank you!     Thank you for choosing [facility]  for your care. Our goal is always to provide you with excellent care. Hearing back from our patients is one way we can continue to improve our services. Please take a few minutes to complete the written survey that you may receive in the mail after your visit with us. Thank you!             Your Updated Medication List - Protect others around you: Learn how to safely use, store and throw away your medicines at www.disposemymeds.org.          This list is accurate as of 2/8/18  9:09 AM.  Always use your most recent med list.                   Brand Name Dispense Instructions for use Diagnosis    HYDROcodone-acetaminophen 5-325 MG per tablet    NORCO    10 tablet    Take 1 tablet by mouth 2 times daily as needed for pain maximum 2 tablet(s) per day    Pelvic pain in female       hydrOXYzine 25 MG tablet    ATARAX    30 tablet    Take 1-2 tablets (25-50 mg) by mouth nightly as needed for anxiety At night    Generalized anxiety disorder       LORazepam 0.5 MG tablet    ATIVAN    20  tablet    Take 1 tablet (0.5 mg) by mouth every 8 hours as needed for anxiety    MICHELLE (generalized anxiety disorder)       mirtazapine 7.5 MG Tabs tablet    REMERON    60 tablet    Take 1 tablet (7.5 mg) by mouth At Bedtime    MICHELLE (generalized anxiety disorder)

## 2018-02-09 NOTE — TELEPHONE ENCOUNTER
Agree with your recommendation  If severe then she should be re-evaluated -- perhaps she needs a repeat imaging? CT?? But this should be done in conjunction with a visit and exam.    SN

## 2018-02-26 ENCOUNTER — TELEPHONE (OUTPATIENT)
Dept: FAMILY MEDICINE | Facility: CLINIC | Age: 33
End: 2018-02-26

## 2018-02-26 NOTE — PROGRESS NOTES
Please call patient with normal results - her ultrasound showed a simple cyst along her left ovary.  These usually resolve in a few cycles.  If her pain is persistent we could have her see an OBGYN as sometimes there are other conditions that can cause pelvic pain.  She can call their clinic and schedule - likely does not need referral  Thank you!    Isela

## 2018-02-26 NOTE — TELEPHONE ENCOUNTER
Isela Clemons MD  [facility]                   Please call patient with normal results - her ultrasound showed a simple cyst along her left ovary.  These usually resolve in a few cycles.  If her pain is persistent we could have her see an OBGYN as sometimes there are other conditions that can cause pelvic pain.  She can call their clinic and schedule - likely does not need referral   Thank you!     Isela

## 2018-02-28 NOTE — TELEPHONE ENCOUNTER
Pt informed, agrees with plan. Will wait a little longer to see if symptoms resolve. Then will contact OBGYN if symptoms persist  Iram Alarcon RN

## 2018-03-19 ENCOUNTER — TELEPHONE (OUTPATIENT)
Dept: FAMILY MEDICINE | Facility: CLINIC | Age: 33
End: 2018-03-19

## 2018-03-19 NOTE — TELEPHONE ENCOUNTER
Pt is past due for f/u pap smear.  Reminder letter was sent 05/17/19.  LMTC and schedule at [facility].  Left this writer's number in case of questions ([phone]).  If no reply and/or appt within 2 weeks (04/02/18) pt will be considered lost to pap tracking f/u.  Rebeca Segura,    Pap Tracking

## 2018-10-01 ENCOUNTER — HEALTH MAINTENANCE LETTER (OUTPATIENT)
Age: 33
End: 2018-10-01

## 2021-06-25 PROBLEM — Z98.890 S/P LAPAROSCOPY: Status: ACTIVE | Noted: 2018-04-21

## 2021-06-25 PROBLEM — Z87.59 HX OF ECTOPIC PREGNANCY: Status: ACTIVE | Noted: 2018-05-08

## 2021-07-14 ENCOUNTER — OFFICE VISIT (OUTPATIENT)
Dept: URGENT CARE | Facility: URGENT CARE | Age: 36
End: 2021-07-14
Payer: COMMERCIAL

## 2021-07-14 VITALS
TEMPERATURE: 97.4 F | DIASTOLIC BLOOD PRESSURE: 73 MMHG | WEIGHT: 150 LBS | SYSTOLIC BLOOD PRESSURE: 111 MMHG | OXYGEN SATURATION: 100 % | HEART RATE: 74 BPM

## 2021-07-14 DIAGNOSIS — J02.9 PHARYNGITIS, UNSPECIFIED ETIOLOGY: Primary | ICD-10-CM

## 2021-07-14 LAB
BASOPHILS # BLD AUTO: 0 10E3/UL (ref 0–0.2)
BASOPHILS NFR BLD AUTO: 0 %
CARTRIDGE EXP DATE, RAPID STREP: NORMAL
CARTRIDGE LOT NUMBER, RAPID STREP: NORMAL
DEPRECATED S PYO AG THROAT QL EIA: NEGATIVE
EOSINOPHIL # BLD AUTO: 0.1 10E3/UL (ref 0–0.7)
EOSINOPHIL NFR BLD AUTO: 1 %
ERYTHROCYTE [DISTWIDTH] IN BLOOD BY AUTOMATED COUNT: 13.2 % (ref 10–15)
HCT VFR BLD AUTO: 40.3 % (ref 35–47)
HGB BLD-MCNC: 14 G/DL (ref 11.7–15.7)
INTERNAL QC, RAPID STREP: NORMAL
LYMPHOCYTES # BLD AUTO: 3 10E3/UL (ref 0.8–5.3)
LYMPHOCYTES NFR BLD AUTO: 45 %
Lab: NORMAL
MCH RBC QN AUTO: 30 PG (ref 26.5–33)
MCHC RBC AUTO-ENTMCNC: 34.7 G/DL (ref 31.5–36.5)
MCV RBC AUTO: 87 FL (ref 78–100)
MONOCYTES # BLD AUTO: 0.6 10E3/UL (ref 0–1.3)
MONOCYTES NFR BLD AUTO: 9 %
MONOCYTES NFR BLD AUTO: NEGATIVE %
NEUTROPHILS # BLD AUTO: 3 10E3/UL (ref 1.6–8.3)
NEUTROPHILS NFR BLD AUTO: 45 %
PLATELET # BLD AUTO: 225 10E3/UL (ref 150–450)
RBC # BLD AUTO: 4.66 10E6/UL (ref 3.8–5.2)
WBC # BLD AUTO: 6.7 10E3/UL (ref 4–11)

## 2021-07-14 PROCEDURE — 86308 HETEROPHILE ANTIBODY SCREEN: CPT | Performed by: PHYSICIAN ASSISTANT

## 2021-07-14 PROCEDURE — 87651 STREP A DNA AMP PROBE: CPT | Performed by: PHYSICIAN ASSISTANT

## 2021-07-14 PROCEDURE — 80048 BASIC METABOLIC PNL TOTAL CA: CPT | Performed by: PHYSICIAN ASSISTANT

## 2021-07-14 PROCEDURE — 99203 OFFICE O/P NEW LOW 30 MIN: CPT | Performed by: PHYSICIAN ASSISTANT

## 2021-07-14 PROCEDURE — 85025 COMPLETE CBC W/AUTO DIFF WBC: CPT | Performed by: PHYSICIAN ASSISTANT

## 2021-07-14 PROCEDURE — 36415 COLL VENOUS BLD VENIPUNCTURE: CPT | Performed by: PHYSICIAN ASSISTANT

## 2021-07-14 RX ORDER — LORAZEPAM 0.5 MG/1
TABLET ORAL
COMMUNITY
Start: 2021-06-08 | End: 2023-03-02

## 2021-07-14 RX ORDER — HYDROXYZINE PAMOATE 50 MG/1
CAPSULE ORAL
COMMUNITY
Start: 2021-04-09 | End: 2022-12-15

## 2021-07-14 RX ORDER — AZITHROMYCIN 250 MG/1
TABLET, FILM COATED ORAL
Qty: 6 TABLET | Refills: 0 | Status: SHIPPED | OUTPATIENT
Start: 2021-07-14 | End: 2021-07-19

## 2021-07-14 RX ORDER — FLUCONAZOLE 150 MG/1
150 TABLET ORAL ONCE
Qty: 1 TABLET | Refills: 0 | Status: SHIPPED | OUTPATIENT
Start: 2021-07-14 | End: 2021-07-14

## 2021-07-15 LAB
ANION GAP SERPL CALCULATED.3IONS-SCNC: 6 MMOL/L (ref 3–14)
BUN SERPL-MCNC: 16 MG/DL (ref 7–30)
CALCIUM SERPL-MCNC: 9.4 MG/DL (ref 8.5–10.1)
CHLORIDE BLD-SCNC: 104 MMOL/L (ref 94–109)
CO2 SERPL-SCNC: 27 MMOL/L (ref 20–32)
CREAT SERPL-MCNC: 0.87 MG/DL (ref 0.52–1.04)
GFR SERPL CREATININE-BSD FRML MDRD: 87 ML/MIN/1.73M2
GLUCOSE BLD-MCNC: 80 MG/DL (ref 70–99)
GROUP A STREP BY PCR: NOT DETECTED
POTASSIUM BLD-SCNC: 4.1 MMOL/L (ref 3.4–5.3)
SODIUM SERPL-SCNC: 137 MMOL/L (ref 133–144)

## 2021-07-15 NOTE — PROGRESS NOTES
Pharyngitis, unspecified etiology  - CBC with platelets and differential; Future  - Mononucleosis screen; Future  - Basic metabolic panel  (Ca, Cl, CO2, Creat, Gluc, K, Na, BUN); Future  - Streptococcus A Rapid Screen w/Reflex to PCR - Clinic Collect  - azithromycin (ZITHROMAX) 250 MG tablet; Take 2 tablets (500 mg) by mouth daily for 1 day, THEN 1 tablet (250 mg) daily for 4 days.  - fluconazole (DIFLUCAN) 150 MG tablet; Take 1 tablet (150 mg) by mouth once for 1 dose  - CBC with platelets and differential  - Mononucleosis screen  - Basic metabolic panel  (Ca, Cl, CO2, Creat, Gluc, K, Na, BUN)  - Group A Streptococcus PCR Throat Swab    Age 12 months or more  Okay to use Zarbee's   Okay to use Rx Children Tylenol if prescribed (Dose based on weight)    Age 2-12:   Okay to use Children Motrin or Tylenol over the counter.    Adults:  Okay to take acetaminophen 500 mg- 2 tabs (Total of 1000 mg) every 8 hrs   Okay to take ibuprofen 200 mg- 3 tabs (Total of 600 mg) every 6 hours        Okay to use Neti pot for sinus lavage up to three times daily for congestion and sinus pressure if present. Daily hot shower can be beneficial for congestion and body aches. Okay to use bedroom vaporizer or humidifier if symptoms are worse at night. Nightly Vicks Vapor rub and 5-10 mg of Melatonin okay to use for sleep.     Over the counter cough medication and decongestants okay if not prescribed by me during this visit. For homeopathic alternatives to cough syrup and decongestant, feel free to try Elderberry extract.    Okay to use salt water gargles, warm tea (or warm water with lemon and honey), and lozenges for any throat discomfort. Chloraseptic spray is also highly encourages for throat pain/irritation.     Patient will need to get plenty of rest and drink at least 1.5-2 liters of fluids daily for adults and 1-1.5 liters for children. If vomiting and not tolerating liquids for more than 24 hrs, please go to your nearest emergency  department for IV fluids and further treatment.     Patient is not contagious after 1 week from start of symptoms. If possible, wear mask for first 7 days. Wash hands regularly and vigorously for 30 seconds often.     20 minutes spent on the date of the encounter doing chart review, history and exam, documentation and further activities per the note       RAPHAEL Dolan [facility] URGENT [facility]ARE    Subjective   36 year old who presents to clinic today for the following health issues:    Urgent Care and Fatigue       HPI     Acute Illness  Acute illness concerns: Fatigue, swollen lymph nodes.   Onset/Duration: 7 days  Symptoms:  Fever: YES  Chills/Sweats: YES  Headache (location?): YES  Sinus Pressure: no  Conjunctivitis:  no  Ear Pain: YES: right  Rhinorrhea: no  Congestion: no  Sore Throat: YES (3/10)  Cough: no  Wheeze: no  Decreased Appetite: no  Nausea: no  Vomiting: no  Diarrhea: no  Dysuria/Freq.: no  Dysuria or Hematuria: no  Fatigue/Achiness: YES  Sick/Strep Exposure: Daughter has been sick  Therapies tried and outcome: Ibuprofen    Review of Systems   Review of Systems   See HPI     Objective    Temp: 97.4  F (36.3  C) Temp src: Tympanic BP: 111/73 Pulse: 74     SpO2: 100 %       Physical Exam   Physical Exam  Constitutional:       General: She is not in acute distress.     Appearance: Normal appearance. She is normal weight. She is not ill-appearing, toxic-appearing or diaphoretic.   HENT:      Head: Normocephalic and atraumatic.      Right Ear: Tympanic membrane, ear canal and external ear normal. There is no impacted cerumen.      Left Ear: Tympanic membrane, ear canal and external ear normal. There is no impacted cerumen.      Nose: Nose normal. No congestion or rhinorrhea.      Mouth/Throat:      Mouth: Mucous membranes are moist.      Pharynx: Oropharynx is clear. Posterior oropharyngeal erythema present. No oropharyngeal exudate.   Eyes:      General:         Right eye: No discharge.          Left eye: No discharge.      Extraocular Movements: Extraocular movements intact.      Conjunctiva/sclera: Conjunctivae normal.      Pupils: Pupils are equal, round, and reactive to light.   Cardiovascular:      Rate and Rhythm: Normal rate and regular rhythm.      Pulses: Normal pulses.      Heart sounds: Normal heart sounds. No murmur heard.   No friction rub. No gallop.    Pulmonary:      Effort: Pulmonary effort is normal. No respiratory distress.      Breath sounds: Normal breath sounds. No stridor. No wheezing, rhonchi or rales.   Chest:      Chest wall: No tenderness.   Abdominal:      General: Abdomen is flat. Bowel sounds are normal.      Palpations: Abdomen is soft.      Tenderness: There is no right CVA tenderness or left CVA tenderness.   Musculoskeletal:      Cervical back: Normal range of motion and neck supple. Tenderness present.   Lymphadenopathy:      Cervical: Cervical adenopathy present.   Neurological:      General: No focal deficit present.      Mental Status: She is alert and oriented to person, place, and time. Mental status is at baseline.      Gait: Gait normal.   Psychiatric:         Mood and Affect: Mood normal.         Behavior: Behavior normal.         Thought Content: Thought content normal.         Judgment: Judgment normal.          Results for orders placed or performed in visit on 07/14/21 (from the past 24 hour(s))   Streptococcus A Rapid Screen w/Reflex to PCR - Clinic Collect    Specimen: Throat; Swab   Result Value Ref Range    Group A Strep antigen Negative Negative    Internal QC, Rapid strep Valid     Lot Number, Rapid strep [id number]     Exp Date, Rapid strep 2022-04-30    CBC with platelets and differential    Narrative    The following orders were created for panel order CBC with platelets and differential.  Procedure                               Abnormality         Status                     ---------                               -----------         ------                      CBC with platelets and d...[[id number]]                      Final result                 Please view results for these tests on the individual orders.   Mononucleosis screen   Result Value Ref Range    Mononucleosis Screen Negative Negative    Internal QC, Mono Valid     Lot Number Mono [id number]     Expiration Date, Mono 2021-11    CBC with platelets and differential   Result Value Ref Range    WBC Count 6.7 4.0 - 11.0 10e3/uL    RBC Count 4.66 3.80 - 5.20 10e6/uL    Hemoglobin 14.0 11.7 - 15.7 g/dL    Hematocrit 40.3 35.0 - 47.0 %    MCV 87 78 - 100 fL    MCH 30.0 26.5 - 33.0 pg    MCHC 34.7 31.5 - 36.5 g/dL    RDW 13.2 10.0 - 15.0 %    Platelet Count 225 150 - 450 10e3/uL    % Neutrophils 45 %    % Lymphocytes 45 %    % Monocytes 9 %    % Eosinophils 1 %    % Basophils 0 %    Absolute Neutrophils 3.0 1.6 - 8.3 10e3/uL    Absolute Lymphocytes 3.0 0.8 - 5.3 10e3/uL    Absolute Monocytes 0.6 0.0 - 1.3 10e3/uL    Absolute Eosinophils 0.1 0.0 - 0.7 10e3/uL    Absolute Basophils 0.0 0.0 - 0.2 10e3/uL

## 2021-08-22 ENCOUNTER — HEALTH MAINTENANCE LETTER (OUTPATIENT)
Age: 36
End: 2021-08-22

## 2021-08-24 ENCOUNTER — LAB (OUTPATIENT)
Dept: URGENT CARE | Facility: URGENT CARE | Age: 36
End: 2021-08-24
Attending: EMERGENCY MEDICINE
Payer: COMMERCIAL

## 2021-08-24 ENCOUNTER — E-VISIT (OUTPATIENT)
Dept: URGENT CARE | Facility: CLINIC | Age: 36
End: 2021-08-24
Payer: COMMERCIAL

## 2021-08-24 DIAGNOSIS — Z20.822 SUSPECTED COVID-19 VIRUS INFECTION: ICD-10-CM

## 2021-08-24 DIAGNOSIS — Z20.822 SUSPECTED COVID-19 VIRUS INFECTION: Primary | ICD-10-CM

## 2021-08-24 PROCEDURE — U0005 INFEC AGEN DETEC AMPLI PROBE: HCPCS

## 2021-08-24 PROCEDURE — U0003 INFECTIOUS AGENT DETECTION BY NUCLEIC ACID (DNA OR RNA); SEVERE ACUTE RESPIRATORY SYNDROME CORONAVIRUS 2 (SARS-COV-2) (CORONAVIRUS DISEASE [COVID-19]), AMPLIFIED PROBE TECHNIQUE, MAKING USE OF HIGH THROUGHPUT TECHNOLOGIES AS DESCRIBED BY CMS-2020-01-R: HCPCS

## 2021-08-24 PROCEDURE — 99421 OL DIG E/M SVC 5-10 MIN: CPT | Performed by: EMERGENCY MEDICINE

## 2021-08-24 NOTE — PATIENT INSTRUCTIONS
Dear Petra Patel,    Your symptoms show that you may have coronavirus (COVID-19). This illness can cause fever, cough and trouble breathing. Many people get a mild case and get better on their own. Some people can get very sick.    Will I be tested for COVID-19?  We would like to test you for Covid-19 virus. I have placed orders for this test.     To schedule: go to your [employer] home page and scroll down to the section that says  You have an appointment that needs to be scheduled  and click the large green button that says  Schedule Now  and follow the steps to find the next available openings.    If you are unable to complete these [employer] scheduling steps, please call [phone] to schedule your testing.     Return to work/school/ guidance:  Please let your workplace manager and staffing office know when your quarantine ends     We can t give you an exact date as it depends on the above. You can calculate this on your own or work with your manager/staffing office to calculate this. (For example if you were exposed on 10/4, you would have to quarantine for 14 full days. That would be through 10/18. You could return on 10/19.)      If you receive a positive COVID-19 test result, follow the guidance of the those who are giving you the results. Usually the return to work is 10 (or in some cases 20 days from symptom onset.) If you work at [facility] [employer], you must also be cleared by Employee Occupational Health and Safety to return to work.        If you receive a negative COVID-19 test result and did not have a high risk exposure to someone with a known positive COVID-19 test, you can return to work once you're free of fever for 24 hours without fever-reducing medication and your symptoms are improving or resolved.      If you receive a negative COVID-19 test and If you had a high risk exposure to someone who has tested positive for COVID-19 then you can return to work 14 days after your last contact  with the positive individual    Note: If you have ongoing exposure to the covid positive person, this quarantine period may be more than 14 days. (For example, if you are continued to be exposed to your child who tested positive and cannot isolate from them, then the quarantine of 7-14 days can't start until your child is no longer contagious. This is typically 10 days from onset of the child's symptoms. So the total duration may be 17-24 days in this case.)    Sign up for [employer].   We know it's scary to hear that you might have COVID-19. We want to track your symptoms to make sure you're okay over the next 2 weeks. Please look for an email from [employer]--this is a free, online program that we'll use to keep in touch. To sign up, follow the link in the email you will receive. Learn more at [email or web address]    How can I take care of myself?    Get lots of rest. Drink extra fluids (unless a doctor has told you not to)    Take Tylenol (acetaminophen) or ibuprofen for fever or pain. If you have liver or kidney problems, ask your family doctor if it's okay to take Tylenol o ibuprofen    If you have other health problems (like cancer, heart failure, an organ transplant or severe kidney disease): Call your specialty clinic if you don't feel better in the next 2 days.    Know when to call 911. Emergency warning signs include:  o Trouble breathing or shortness of breath  o Pain or pressure in the chest that doesn't go away  o Feeling confused like you haven't felt before, or not being able to wake up  o Bluish-colored lips or face    Where can I get more information?   [facility] - About COVID-19:   [email or web address]    CDC - What to Do If You're Sick:   www.cdc.gov/coronavirus/2019-ncov/about/steps-when-sick.html    August 24, 2021  RE:  Petra Patel                                                                                                                  [address]  [address] [address]  [facility] [address]      To whom it may concern:    I evaluated Petra Patel on August 24, 2021. Petra Patel should be excused from work/school.     They should let their workplace manager and staffing office know when their quarantine ends.    We can not give an exact date as it depends on the information below. They can calculate this on their own or work with their manager/staffing office to calculate this. (For example if they were exposed on 10/04, they would have to quarantine for 14 full days. That would be through 10/18. They could return on 10/19.)    Quarantine Guidelines:      If patient receives a positive COVID-19 test result, they should follow the guidance of those who are giving the results. Usually the return to work is 10 (or in some cases 20 days from symptom onset.) If they work at [employer], they must be cleared by Employee Occupational Health and Safety to return to work.        If patient receives a negative COVID-19 test result and did not have a high risk exposure to someone with a known positive COVID-19 test, they can return to work once they're free of fever for 24 hours without fever-reducing medication and their symptoms are improving or resolved.      If patient receives a negative COVID-19 test and if they had a high risk exposure to someone who has tested positive for COVID-19 then they can return to work 14 days after their last contact with the positive individual    Note: If there is ongoing exposure to the covid positive person, this quarantine period may be longer than 14 days. (For example, if they are continually exposed to their child, who tested positive and cannot isolate from them, then the quarantine of 7-14 days can't start until their child is no longer contagious. This is typically 10 days from onset to the child's symptoms. So the total duration may be 17-24 days in this case.)     Sincerely,  Don Coello MD

## 2021-08-25 LAB — SARS-COV-2 RNA RESP QL NAA+PROBE: NEGATIVE

## 2022-11-16 ENCOUNTER — OFFICE VISIT (OUTPATIENT)
Dept: URGENT CARE | Facility: URGENT CARE | Age: 37
End: 2022-11-16
Payer: COMMERCIAL

## 2022-11-16 DIAGNOSIS — Z53.9 DIAGNOSIS NOT YET DEFINED: Primary | ICD-10-CM

## 2022-11-17 ENCOUNTER — TELEPHONE (OUTPATIENT)
Dept: URGENT CARE | Facility: URGENT CARE | Age: 37
End: 2022-11-17

## 2022-11-17 ENCOUNTER — OFFICE VISIT (OUTPATIENT)
Dept: URGENT CARE | Facility: URGENT CARE | Age: 37
End: 2022-11-17
Payer: COMMERCIAL

## 2022-11-17 ENCOUNTER — NURSE TRIAGE (OUTPATIENT)
Dept: FAMILY MEDICINE | Facility: CLINIC | Age: 37
End: 2022-11-17

## 2022-11-17 VITALS
RESPIRATION RATE: 16 BRPM | TEMPERATURE: 99.3 F | HEART RATE: 88 BPM | SYSTOLIC BLOOD PRESSURE: 104 MMHG | OXYGEN SATURATION: 100 % | DIASTOLIC BLOOD PRESSURE: 60 MMHG

## 2022-11-17 DIAGNOSIS — R05.1 ACUTE COUGH: Primary | ICD-10-CM

## 2022-11-17 DIAGNOSIS — I88.9 LYMPHADENITIS: ICD-10-CM

## 2022-11-17 DIAGNOSIS — H60.391 INFECTIVE OTITIS EXTERNA, RIGHT: ICD-10-CM

## 2022-11-17 DIAGNOSIS — H60.391 INFECTIVE OTITIS EXTERNA, RIGHT: Primary | ICD-10-CM

## 2022-11-17 DIAGNOSIS — J39.2 THROAT IRRITATION: ICD-10-CM

## 2022-11-17 PROCEDURE — 99214 OFFICE O/P EST MOD 30 MIN: CPT | Performed by: PHYSICIAN ASSISTANT

## 2022-11-17 RX ORDER — NEOMYCIN SULFATE, POLYMYXIN B SULFATE AND HYDROCORTISONE 10; 3.5; 1 MG/ML; MG/ML; [USP'U]/ML
3 SUSPENSION/ DROPS AURICULAR (OTIC) 4 TIMES DAILY
Qty: 10 ML | Refills: 0 | Status: SHIPPED | OUTPATIENT
Start: 2022-11-17 | End: 2022-11-27

## 2022-11-17 RX ORDER — CIPROFLOXACIN AND DEXAMETHASONE 3; 1 MG/ML; MG/ML
4 SUSPENSION/ DROPS AURICULAR (OTIC) 2 TIMES DAILY
Qty: 7.5 ML | Refills: 0 | Status: SHIPPED | OUTPATIENT
Start: 2022-11-17 | End: 2022-11-24

## 2022-11-17 RX ORDER — CEPHALEXIN 500 MG/1
500 CAPSULE ORAL 3 TIMES DAILY
Qty: 21 CAPSULE | Refills: 0 | Status: SHIPPED | OUTPATIENT
Start: 2022-11-17 | End: 2022-11-24

## 2022-11-17 RX ORDER — CEFDINIR 300 MG/1
300 CAPSULE ORAL 2 TIMES DAILY
Qty: 20 CAPSULE | Refills: 0 | Status: SHIPPED | OUTPATIENT
Start: 2022-11-17 | End: 2022-11-27

## 2022-11-17 RX ORDER — BENZONATATE 200 MG/1
200 CAPSULE ORAL 3 TIMES DAILY PRN
Qty: 21 CAPSULE | Refills: 0 | Status: SHIPPED | OUTPATIENT
Start: 2022-11-17 | End: 2022-11-24

## 2022-11-17 RX ORDER — ESCITALOPRAM OXALATE 5 MG/1
TABLET ORAL
COMMUNITY
Start: 2022-05-02 | End: 2022-12-15

## 2022-11-17 NOTE — TELEPHONE ENCOUNTER
"Pt was seen at      Only took the Cefdinir - 1 hour ago     Symptoms: HR keeps going up, shaky, clammy, mildy dizzy (started 20 mins after taking)     HR was 130, normally is 90, bouncing around 90/120/90/130    \"extremely sensitive to medications\"     No chest pain     Can walk fine     Temp is normal now - took Ibuprofen 2 hours ago     Huddled with onsite provider - advised pt not take further doses of Cefdinir, go back to  and speak with staff there - possibly may need alternative Rx       "

## 2022-11-17 NOTE — TELEPHONE ENCOUNTER
1. Received a call from the patient stating she took one dose of her antibiotic and her heart rate increased to 130s and she got shaky. Patient is requesting a different medication/antibiotic be sent to the pharmacy?    2. Patient states the ear drops were going to cost the patient $210. Pharmacy was not able to give the patient any alternatives that would be covered by her insurance. Patient is wondering if the provider has any recommendations?    Patient would like medications sent to [facility] in [place] off [facility].     Will route message to  providers for review.     Can we leave a detailed message on this number? YES  Phone number patient can be reached at: Home number on file [phone] (home)    Lidia Pillai RN  [facility] Triage

## 2022-11-17 NOTE — PROGRESS NOTES
Assessment & Plan     Acute cough    Patient was educated on the natural course of viral illness which typically lasts up to 7-10 days.  Conservative measures discussed including rest, increased fluids, nasal saline irrigation (neti pot), warm steamy shower, salt water gargles, cough suppressants, expectorants (Mucinex), afrin nasal spray for up to 3 days as needed for congestion, and analgesics (Tylenol and/or Ibuprofen). If symptoms persist or worsen then follow up with primary care provider or return to the urgent care.  Seek emergency care if you develop fever over 104 shortness of breath, neck stiffness with headache or severe symptoms that require immediate attention.  Patient understands and agrees with plan.     - benzonatate (TESSALON) 200 MG capsule; Take 1 capsule (200 mg) by mouth 3 times daily as needed    Infective otitis externa, right    ciprodex for otitis externa   Motrin    - cefdinir (OMNICEF) 300 MG capsule; Take 1 capsule (300 mg) by mouth 2 times daily for 10 days  - ciprofloxacin-dexamethasone (CIPRODEX) 0.3-0.1 % otic suspension; Place 4 drops into the right ear 2 times daily for 7 days    Throat irritation    - magic mouthwash (ENTER INGREDIENTS IN COMMENTS) suspension; Swish and spit 5-10 mLs in mouth every 6 hours as needed 30 ml of Benadryl (12.5 mg/5 ml), 60 ml Maalox, 30 ml Viscous Lidocaine    Lymphadenitis    You have a bacterial infection of a lymph node. The lymph nodes are part of your immune system. They are found under the jaw and along the side of the neck, in the armpits groin, and some other parts of the body. An infection or inflammation in nearby tissues causes the lymph nodes to swell and become tender.   When a bacterial infection occurs in the lymph node, it becomes very painful. The nearby skin gets red and warm. You may also have a fever.   Antibiotics and warm compresses are used to treat this infection. The pain and redness will get better over the next 7 to 10  days. Swelling may take several months to completely go away.   Sometimes an abscess (with pus) forms inside the lymph node. If this happens, antibiotics may not be enough to cure the infection. Your healthcare provider may advise draining it with a needle or that minor surgery is needed to better drain the pus. You may need blood tests or a study of the pus inside the abscess to guide your treatment.     - cefdinir (OMNICEF) 300 MG capsule; Take 1 capsule (300 mg) by mouth 2 times daily for 10 days    Review of external notes as documented elsewhere in note    At today's visit with Petra Patel , we discussed results, diagnosis, medications and formulated a plan.  We also discussed red flags for immediate return to clinic/ER, as well as indications for follow up with PCP if not improved in 3 days. Patient understood and agreed to plan. Petra Patel was discharged with stable vitals and has no further questions.       No follow-ups on file.    Tre Leiva, [facility], PA-ALEXANDER  [facility] URGENT CARE [facility]    Juan Lambert is a 37 year old, presenting for the following health issues:  Otalgia (R ear pain and drainage as well as a cough that is affecting sleep for 1 week)      HPI   Review of Systems   Constitutional, HEENT, cardiovascular, pulmonary, gi and gu systems are negative, except as otherwise noted.      Objective    /60   Pulse 88   Temp 99.3  F (37.4  C) (Tympanic)   Resp 16   SpO2 100%   There is no height or weight on file to calculate BMI.  Physical Exam   GENERAL: healthy, alert and no distress  EYES: Eyes grossly normal to inspection, PERRL and conjunctivae and sclerae normal  HENT: right ear: purulent drainage in canal and red and boggy canal, left ear: normal: no effusions, no erythema, normal landmarks, nose and mouth without ulcers or lesions, oropharynx clear and oral mucous membranes moist  NECK:   Positive for left anterior and posterior lymphadenitis  RESP: lungs  clear to auscultation - no rales, rhonchi or wheezes  CV: regular rate and rhythm, normal S1 S2, no S3 or S4, no murmur, click or rub, no peripheral edema and peripheral pulses strong  MS: no gross musculoskeletal defects noted, no edema  SKIN: no suspicious lesions or rashes  NEURO: Normal strength and tone, mentation intact and speech normal

## 2022-12-10 ENCOUNTER — NURSE TRIAGE (OUTPATIENT)
Dept: NURSING | Facility: CLINIC | Age: 37
End: 2022-12-10

## 2022-12-10 NOTE — TELEPHONE ENCOUNTER
Patient is calling and states that she was exposed to covid.  Daughter tested positive for covid on Thursday.  Mom has questions today on quarantine.      Reason for Disposition    Health Information question, no triage required and triager able to answer question    Additional Information    Negative: RN needs further essential information from caller in order to complete triage    Negative: Requesting regular office appointment    Negative: [1] Caller requesting NON-URGENT health information AND [2] PCP's office is the best resource    Protocols used: INFORMATION ONLY CALL - NO TRIAGE-A-

## 2022-12-15 ENCOUNTER — NURSE TRIAGE (OUTPATIENT)
Dept: NURSING | Facility: CLINIC | Age: 37
End: 2022-12-15

## 2022-12-15 ENCOUNTER — HOSPITAL ENCOUNTER (EMERGENCY)
Facility: CLINIC | Age: 37
Discharge: LEFT BEFORE DISCHARGE | End: 2022-12-16
Admitting: EMERGENCY MEDICINE
Payer: COMMERCIAL

## 2022-12-15 ENCOUNTER — E-VISIT (OUTPATIENT)
Dept: URGENT CARE | Facility: URGENT CARE | Age: 37
End: 2022-12-15
Payer: COMMERCIAL

## 2022-12-15 DIAGNOSIS — R06.02 SOB (SHORTNESS OF BREATH): Primary | ICD-10-CM

## 2022-12-15 PROCEDURE — C9803 HOPD COVID-19 SPEC COLLECT: HCPCS

## 2022-12-15 PROCEDURE — 999N000104 HC STATISTIC NO CHARGE

## 2022-12-15 NOTE — TELEPHONE ENCOUNTER
Patient has had Covid for the last week.    She states she is having trouble walking. She states she is getting short of breath with walking, her legs are weak, and she is light headed.    Per protocol patient advised to be seen today. Patient agrees with the plan.  Sierra Nelson RN on 12/15/2022 at 12:44 PM      Reason for Disposition    MILD difficulty breathing (e.g., minimal/no SOB at rest, SOB with walking, pulse <100)    Additional Information    Negative: SEVERE difficulty breathing (e.g., struggling for each breath, speaks in single words)    Negative: Difficult to awaken or acting confused (e.g., disoriented, slurred speech)    Negative: Bluish (or gray) lips or face now    Negative: Shock suspected (e.g., cold/pale/clammy skin, too weak to stand, low BP, rapid pulse)    Negative: Sounds like a life-threatening emergency to the triager    Negative: [1] Diagnosed or suspected COVID-19 AND [2] symptoms lasting 3 or more weeks    Negative: [1] COVID-19 exposure AND [2] no symptoms    Negative: COVID-19 vaccine reaction suspected (e.g., fever, headache, muscle aches) occurring 1 to 3 days after getting vaccine    Negative: COVID-19 vaccine, questions about    Negative: [1] Lives with someone known to have influenza (flu test positive) AND [2] flu-like symptoms (e.g., cough, runny nose, sore throat, SOB; with or without fever)    Negative: [1] Adult with possible COVID-19 symptoms AND [2] triager concerned about severity of symptoms or other causes    Negative: COVID-19 and breastfeeding, questions about    Negative: SEVERE or constant chest pain or pressure  (Exception: Mild central chest pain, present only when coughing.)    Negative: MODERATE difficulty breathing (e.g., speaks in phrases, SOB even at rest, pulse 100-120)    Negative: Headache and stiff neck (can't touch chin to chest)    Negative: Oxygen level (e.g., pulse oximetry) 90 percent or lower    Negative: Chest pain or pressure  (Exception: MILD  central chest pain, present only when coughing)    Negative: Patient sounds very sick or weak to the triager    Protocols used: CORONAVIRUS (COVID-19) DIAGNOSED OR [id number]

## 2022-12-15 NOTE — PATIENT INSTRUCTIONS
Dear Petra Patel,    We are sorry you are not feeling well. Based on the responses you provided, it is recommended that you be seen in-person in urgent care so we can better evaluate your symptoms. Please click here to find the nearest urgent care location to you.   You will not be charged for this Visit. Thank you for trusting us with your care.    Teena Aguirre MD

## 2022-12-16 VITALS
OXYGEN SATURATION: 96 % | HEIGHT: 66 IN | SYSTOLIC BLOOD PRESSURE: 123 MMHG | TEMPERATURE: 98.9 F | DIASTOLIC BLOOD PRESSURE: 40 MMHG | BODY MASS INDEX: 24.43 KG/M2 | RESPIRATION RATE: 16 BRPM | HEART RATE: 92 BPM | WEIGHT: 152 LBS

## 2022-12-16 LAB
FLUAV RNA SPEC QL NAA+PROBE: NEGATIVE
FLUBV RNA RESP QL NAA+PROBE: NEGATIVE
RSV RNA SPEC NAA+PROBE: NEGATIVE
SARS-COV-2 RNA RESP QL NAA+PROBE: POSITIVE

## 2022-12-16 PROCEDURE — 87637 SARSCOV2&INF A&B&RSV AMP PRB: CPT | Performed by: EMERGENCY MEDICINE

## 2022-12-16 NOTE — ED TRIAGE NOTES
Patient states at home test on Sun for Covid which was positive.   Having really bad anxiety & not able to sleep for last 48 hours.  Tonight took 2 tabs Melatonin & CBD.  Did not take ativan.  Crying.

## 2022-12-16 NOTE — TELEPHONE ENCOUNTER
Patient calling reports being diagnosed with COVID last week with increasing weakness, fatigue, panic attacks and overall feeling foggy. Reports dizziness is significant where the patient requires support with walking. Patient has taken multiple medications to try to help with sleep with mental health concerns increasing. Denies difficulty breathing, bluish face and confusion. Advised per protocol to be seen in the ER with patient agreeable to the plan.     Lexii Mosley RN 12/15/22 11:30 PM   [facility] Triage Nurse Advisor      Reason for Disposition    [1] Lightheadedness or dizziness AND [2] persists > 10 minutes AND [3] not relieved by reassurance provided by triager    Additional Information    Negative: SEVERE difficulty breathing (e.g., struggling for each breath, speaks in single words)    Negative: Bluish (or gray) lips or face now    Negative: Difficult to awaken or acting confused (e.g., disoriented, slurred speech)    Negative: Hysterical or combative behavior    Negative: Sounds like a life-threatening emergency to the triager    Negative: Chest pain    Negative: Palpitations, skipped heart beat, or rapid heart beat    Negative: Cough is main symptom    Negative: Suicide thoughts, threats, attempts, or questions    Negative: Depression is main problem or symptom (e.g., feelings of sadness or hopelessness)    Negative: [1] Difficulty breathing AND [2] persists > 10 minutes AND [3] not relieved by reassurance provided by triager    Protocols used: ANXIETY AND PANIC ATTACK-A-

## 2022-12-16 NOTE — RESULT ENCOUNTER NOTE
Negative for Influenza A, Influenza B, and RSV.  Positive Covid19 result and the patient will be notified of their positive Covid19 result via [email or web address] (if active) or they will be contacted by via phone call if not active on [email or web address].  A letter is sent to patient via [email or web address] or via mail (if no [email or web address]).

## 2023-02-06 RX ORDER — LORAZEPAM 0.5 MG/1
TABLET ORAL
Status: CANCELLED | OUTPATIENT
Start: 2023-02-06

## 2023-02-06 NOTE — TELEPHONE ENCOUNTER
Patient called back.  Scheduled her to establish care with SN.    Wants to discuss Lorazepam. Psych visits cost her $1500    Yuki Traore RN

## 2023-02-06 NOTE — TELEPHONE ENCOUNTER
Left message for patient to call [facility] back  When patient calls back please transfer to an RN  Sarah Beth MORENO RN

## 2023-02-23 ASSESSMENT — ENCOUNTER SYMPTOMS
FEVER: 0
HEARTBURN: 0
ABDOMINAL PAIN: 1
WEAKNESS: 0
DIZZINESS: 0
SHORTNESS OF BREATH: 0
HEMATOCHEZIA: 0
JOINT SWELLING: 0
HEMATURIA: 0
PALPITATIONS: 0
ARTHRALGIAS: 1
COUGH: 0
HEADACHES: 0
MYALGIAS: 1
FREQUENCY: 0
SORE THROAT: 0
CONSTIPATION: 0
DYSURIA: 0
DIARRHEA: 0
NAUSEA: 0
PARESTHESIAS: 0
NERVOUS/ANXIOUS: 1
BREAST MASS: 0
CHILLS: 0
EYE PAIN: 0

## 2023-02-23 ASSESSMENT — ASTHMA QUESTIONNAIRES
QUESTION_5 LAST FOUR WEEKS HOW WOULD YOU RATE YOUR ASTHMA CONTROL: COMPLETELY CONTROLLED
QUESTION_3 LAST FOUR WEEKS HOW OFTEN DID YOUR ASTHMA SYMPTOMS (WHEEZING, COUGHING, SHORTNESS OF BREATH, CHEST TIGHTNESS OR PAIN) WAKE YOU UP AT NIGHT OR EARLIER THAN USUAL IN THE MORNING: NOT AT ALL
ACT_TOTALSCORE: 25
QUESTION_1 LAST FOUR WEEKS HOW MUCH OF THE TIME DID YOUR ASTHMA KEEP YOU FROM GETTING AS MUCH DONE AT WORK, SCHOOL OR AT HOME: NONE OF THE TIME
QUESTION_4 LAST FOUR WEEKS HOW OFTEN HAVE YOU USED YOUR RESCUE INHALER OR NEBULIZER MEDICATION (SUCH AS ALBUTEROL): NOT AT ALL
ACT_TOTALSCORE: 25
QUESTION_2 LAST FOUR WEEKS HOW OFTEN HAVE YOU HAD SHORTNESS OF BREATH: NOT AT ALL

## 2023-02-23 ASSESSMENT — PATIENT HEALTH QUESTIONNAIRE - PHQ9
SUM OF ALL RESPONSES TO PHQ QUESTIONS 1-9: 17
SUM OF ALL RESPONSES TO PHQ QUESTIONS 1-9: 17
10. IF YOU CHECKED OFF ANY PROBLEMS, HOW DIFFICULT HAVE THESE PROBLEMS MADE IT FOR YOU TO DO YOUR WORK, TAKE CARE OF THINGS AT HOME, OR GET ALONG WITH OTHER PEOPLE: VERY DIFFICULT

## 2023-03-02 ENCOUNTER — OFFICE VISIT (OUTPATIENT)
Dept: FAMILY MEDICINE | Facility: CLINIC | Age: 38
End: 2023-03-02
Payer: COMMERCIAL

## 2023-03-02 VITALS
HEIGHT: 65 IN | DIASTOLIC BLOOD PRESSURE: 65 MMHG | OXYGEN SATURATION: 100 % | HEART RATE: 88 BPM | BODY MASS INDEX: 25.83 KG/M2 | TEMPERATURE: 99.4 F | RESPIRATION RATE: 16 BRPM | SYSTOLIC BLOOD PRESSURE: 103 MMHG | WEIGHT: 155 LBS

## 2023-03-02 DIAGNOSIS — Z00.00 WELL ADULT EXAM: Primary | ICD-10-CM

## 2023-03-02 DIAGNOSIS — R10.2 PELVIC PAIN IN FEMALE: ICD-10-CM

## 2023-03-02 DIAGNOSIS — Z12.4 CERVICAL CANCER SCREENING: ICD-10-CM

## 2023-03-02 DIAGNOSIS — M79.10 MYALGIA: ICD-10-CM

## 2023-03-02 DIAGNOSIS — F51.01 PRIMARY INSOMNIA: ICD-10-CM

## 2023-03-02 DIAGNOSIS — Z11.59 NEED FOR HEPATITIS C SCREENING TEST: ICD-10-CM

## 2023-03-02 DIAGNOSIS — F33.1 MAJOR DEPRESSIVE DISORDER, RECURRENT, MODERATE (H): ICD-10-CM

## 2023-03-02 LAB
ALBUMIN SERPL BCG-MCNC: 4.6 G/DL (ref 3.5–5.2)
ALP SERPL-CCNC: 29 U/L (ref 35–104)
ALT SERPL W P-5'-P-CCNC: 12 U/L (ref 10–35)
ANION GAP SERPL CALCULATED.3IONS-SCNC: 12 MMOL/L (ref 7–15)
AST SERPL W P-5'-P-CCNC: 19 U/L (ref 10–35)
BILIRUB SERPL-MCNC: 0.4 MG/DL
BUN SERPL-MCNC: 17.2 MG/DL (ref 6–20)
CALCIUM SERPL-MCNC: 9.9 MG/DL (ref 8.6–10)
CHLORIDE SERPL-SCNC: 103 MMOL/L (ref 98–107)
CREAT SERPL-MCNC: 0.86 MG/DL (ref 0.51–0.95)
DEPRECATED HCO3 PLAS-SCNC: 22 MMOL/L (ref 22–29)
ERYTHROCYTE [DISTWIDTH] IN BLOOD BY AUTOMATED COUNT: 13.2 % (ref 10–15)
FERRITIN SERPL-MCNC: 31 NG/ML (ref 6–175)
GFR SERPL CREATININE-BSD FRML MDRD: 89 ML/MIN/1.73M2
GLUCOSE SERPL-MCNC: 105 MG/DL (ref 70–99)
HCT VFR BLD AUTO: 41.2 % (ref 35–47)
HGB BLD-MCNC: 13.8 G/DL (ref 11.7–15.7)
IRON BINDING CAPACITY (ROCHE): 283 UG/DL (ref 240–430)
IRON SATN MFR SERPL: 37 % (ref 15–46)
IRON SERPL-MCNC: 105 UG/DL (ref 37–145)
MCH RBC QN AUTO: 29.4 PG (ref 26.5–33)
MCHC RBC AUTO-ENTMCNC: 33.5 G/DL (ref 31.5–36.5)
MCV RBC AUTO: 88 FL (ref 78–100)
PLATELET # BLD AUTO: 206 10E3/UL (ref 150–450)
POTASSIUM SERPL-SCNC: 4.4 MMOL/L (ref 3.4–5.3)
PROT SERPL-MCNC: 7.1 G/DL (ref 6.4–8.3)
RBC # BLD AUTO: 4.7 10E6/UL (ref 3.8–5.2)
SODIUM SERPL-SCNC: 137 MMOL/L (ref 136–145)
TSH SERPL DL<=0.005 MIU/L-ACNC: 0.79 UIU/ML (ref 0.3–4.2)
WBC # BLD AUTO: 7.5 10E3/UL (ref 4–11)

## 2023-03-02 PROCEDURE — 82728 ASSAY OF FERRITIN: CPT | Performed by: FAMILY MEDICINE

## 2023-03-02 PROCEDURE — 86803 HEPATITIS C AB TEST: CPT | Performed by: FAMILY MEDICINE

## 2023-03-02 PROCEDURE — 80053 COMPREHEN METABOLIC PANEL: CPT | Performed by: FAMILY MEDICINE

## 2023-03-02 PROCEDURE — G0145 SCR C/V CYTO,THINLAYER,RESCR: HCPCS | Performed by: FAMILY MEDICINE

## 2023-03-02 PROCEDURE — 87624 HPV HI-RISK TYP POOLED RSLT: CPT | Performed by: FAMILY MEDICINE

## 2023-03-02 PROCEDURE — 99213 OFFICE O/P EST LOW 20 MIN: CPT | Mod: 25 | Performed by: FAMILY MEDICINE

## 2023-03-02 PROCEDURE — 83550 IRON BINDING TEST: CPT | Performed by: FAMILY MEDICINE

## 2023-03-02 PROCEDURE — 36415 COLL VENOUS BLD VENIPUNCTURE: CPT | Performed by: FAMILY MEDICINE

## 2023-03-02 PROCEDURE — 84443 ASSAY THYROID STIM HORMONE: CPT | Performed by: FAMILY MEDICINE

## 2023-03-02 PROCEDURE — 82306 VITAMIN D 25 HYDROXY: CPT | Performed by: FAMILY MEDICINE

## 2023-03-02 PROCEDURE — 85027 COMPLETE CBC AUTOMATED: CPT | Performed by: FAMILY MEDICINE

## 2023-03-02 PROCEDURE — 99395 PREV VISIT EST AGE 18-39: CPT | Performed by: FAMILY MEDICINE

## 2023-03-02 PROCEDURE — 83540 ASSAY OF IRON: CPT | Performed by: FAMILY MEDICINE

## 2023-03-02 RX ORDER — LORAZEPAM 1 MG/1
TABLET ORAL
COMMUNITY
Start: 2023-01-02 | End: 2023-06-26 | Stop reason: DRUGHIGH

## 2023-03-02 ASSESSMENT — ENCOUNTER SYMPTOMS
CHILLS: 0
FREQUENCY: 0
PALPITATIONS: 0
DYSURIA: 0
ABDOMINAL PAIN: 1
HEMATOCHEZIA: 0
NAUSEA: 0
JOINT SWELLING: 0
COUGH: 0
CONSTIPATION: 0
HEADACHES: 0
DIZZINESS: 0
NERVOUS/ANXIOUS: 1
EYE PAIN: 0
PARESTHESIAS: 0
SORE THROAT: 0
DIARRHEA: 0
HEMATURIA: 0
HEARTBURN: 0
ARTHRALGIAS: 1
WEAKNESS: 0
BREAST MASS: 0
FEVER: 0
SHORTNESS OF BREATH: 0
MYALGIAS: 1

## 2023-03-02 ASSESSMENT — PAIN SCALES - GENERAL: PAINLEVEL: NO PAIN (0)

## 2023-03-02 NOTE — PATIENT INSTRUCTIONS
"I think counseling would be very helpful for you.  The best way to find out if a counselor is covered under your insurance is to call the mental health line on the back of your card and see where they cover.  Here are some other good people or clinics that I've worked with:    There is the [facility], which is free,  It's run by volunteers - both people who are fully licensed and people who are still under supervision.    [employer]  [facility] [place] [address]  [phone]    [email or web address].[employer] - this is a search engine to look for bios on therapists    [facility]  [phone]  Rebel Harvey    Helen Goss  Love both sides consulting POC focus    [facility]  [employer]   and POC talk therapy  Several sites  [phone]    Shanika Lazo:  Hawk Almaguer:  Teens  [employer]      Chana CORRALS consulting      Sandra Disla  Transracial and international adoption    Juana Brady:  POC interest    [facility] -  therapist group    Kesma  African American therapist      Centered  Care:  [facility]    Dr. Micheal Quintero  \"Chemistry of [employer]\"  [facility]  Does not take insurance    Marilu Salamanca:  [phone] - no insurance  [phone]    Shanika Lal -- [facility] of Counseling:  [facility]  Takes insurance  Sees teens    Eri Mack:  Psych recovery  [address]  [address]  [place], [place]  [phone]      Sofia Talbert PhD, LP  Licensed Psychologist  [phone]  [email or web address]    Yolette Ortez MA  [facility] Sex Therapist  [address]  [place] [address]  [phone]    [facility]  [phone]  Nury Whitney or others    [facility] for Personal and Family Development  [address]  Helen Munoz   Tim Tim   [phone] (answered 24 " hours)      Blanca Vigil  Specializes in Eating Disorders  [address]  [phone]    Teena Medellin   EMDR provider      Dusty Hummel  [place] [facility]  [address] [place] [address] [phone]    [facility]  Beatriz Santa LP OR Carly Gershone ([facility])  [phone]  They are very good and have therapists who focus on grief, anxiety and adjustment    Rachele Benavides M.A., SUSHMA  Specialties:  Adjustment Problems/Stress, Anxiety, Death and Dying, Depression, Grief and Loss, Physical/Chronic Health Issues, Posttraumatic Stress Disorder, Self Esteem, Trauma, Work Issues  Services:  Couples Psychotherapy, EMDR, Group Therapy, Individual psychotherapy  Ages Served:  Adolescent (age 13-17), Adult (age 18-64), Elder (age 65+)    Private Practice  [address] [address]  [place] [facility]  [address] [place] [address]    Melanie HARRY  Individual and Group Psychotherapy  Dialectic Behavior Therapy  Teens, Adults  [facility]  [phone]  [email or web address]  [employer]  [facility]  Chana [phone]    PrairiJose Maria  [phone]  [phone]  Dr. Jill Crowder  Therapist  [facility]  [phone]    Rachele Soriano: [phone]    Anna Boswell: [phone]    Daina Yoo:  [phone]    [facility]:  [phone]    Dr. Tasha Xie  Child psychologist (0-8+)  [phone]  [address] [place] [address]    Tylor Bowden , Niya  [phone]  Peds Psychiatry     [facility]/Eating disorders  Sherry Rice  [phone]  [email or web address]  General number  [phone] [facility]    Mental health emergency:    [facility] mobile crisis teams:  Adults - COPE [phone]    CHildren 17 and under - [phone]

## 2023-03-02 NOTE — PROGRESS NOTES
SUBJECTIVE:   CC: Petra is an 37 year old who presents for preventive health visit.   Patient has been advised of split billing requirements and indicates understanding: Yes  Healthy Habits:     Getting at least 3 servings of Calcium per day:  Yes    Bi-annual eye exam:  NO    Dental care twice a year:  NO    Sleep apnea or symptoms of sleep apnea:  Daytime drowsiness    Diet:  Regular (no restrictions)    Frequency of exercise:  6-7 days/week    Duration of exercise:  30-45 minutes    Taking medications regularly:  Yes    Medication side effects:  Lightheadedness and Other    PHQ-2 Total Score: 4    Additional concerns today:  Yes      Insomnia:  Worse with PMDD  Has had to use ativan  Wakes up has panic attacks  Sometimes only getting 4 hours sleep  Has used CBD and melatonin  In the past would sleep 12 hours at a time  Was drinking a lot in 20s  Worse in the last 10-15 years    Has sleep provider visit     Sometimes has nightmares/terrors  Usually with certain meds    Sees psychiatry  Insurance changed and needed to est care  Has too much sensitivity with meds        PROBLEMS TO ADD ON...    Today's PHQ-2 Score:   PHQ-2 ( 1999 [employer]) 2/23/2023   Q1: Little interest or pleasure in doing things 2   Q2: Feeling down, depressed or hopeless 2   PHQ-2 Score 4   Q1: Little interest or pleasure in doing things More than half the days   Q2: Feeling down, depressed or hopeless More than half the days   PHQ-2 Score 4       Have you ever done Advance Care Planning? (For example, a Health Directive, POLST, or a discussion with a medical provider or your loved ones about your wishes): No, advance care planning information given to patient to review.  Patient declined advance care planning discussion at this time.    Social History     Tobacco Use     Smoking status: Never     Smokeless tobacco: Never   Substance Use Topics     Alcohol use: Yes     Alcohol/week: 0.0 standard drinks     Comment: social         Alcohol  Use 2/23/2023   Prescreen: >3 drinks/day or >7 drinks/week? Not Applicable   No flowsheet data found.    Reviewed orders with patient.  Reviewed health maintenance and updated orders accordingly - Yes  Labs reviewed in EPIC    Breast Cancer Screening:    Breast CA Risk Assessment (FHS-7) 2/23/2023   Do you have a family history of breast, colon, or ovarian cancer? No / Unknown           Pertinent mammograms are reviewed under the imaging tab.    History of abnormal Pap smear: NO - age 30-65 PAP every 5 years with negative HPV co-testing recommended  PAP / HPV 3/21/2016 11/12/2012 9/14/2011   PAP (Historical) NIL NIL LSIL(A)     Reviewed and updated as needed this visit by clinical staff   Tobacco  Allergies  Meds  Problems  Med Hx  Surg Hx  Fam Hx          Reviewed and updated as needed this visit by Provider   Tobacco  Allergies  Meds  Problems  Med Hx  Surg Hx  Fam Hx         Past Medical History:   Diagnosis Date     Anxiety      Asthma      Depressive disorder      LSIL (low grade squamous intraepithelial lesion) on Pap smear 9/2011    noncompliant with colp      Past Surgical History:   Procedure Laterality Date     APPENDECTOMY       ESOPHAGOSCOPY, GASTROSCOPY, DUODENOSCOPY (EGD), COMBINED  3/20/2013    Procedure: COMBINED ESOPHAGOSCOPY, GASTROSCOPY, DUODENOSCOPY (EGD), BIOPSY SINGLE OR MULTIPLE;;  Surgeon: Steffen Bran MD;  Location: [facility]       Review of Systems   Constitutional: Negative for chills and fever.   HENT: Negative for congestion, ear pain, hearing loss and sore throat.    Eyes: Negative for pain and visual disturbance.   Respiratory: Negative for cough and shortness of breath.    Cardiovascular: Negative for chest pain, palpitations and peripheral edema.   Gastrointestinal: Positive for abdominal pain. Negative for constipation, diarrhea, heartburn, hematochezia and nausea.   Breasts:  Negative for tenderness, breast mass and discharge.   Genitourinary: Positive for pelvic pain.  "Negative for dysuria, frequency, genital sores, hematuria, urgency, vaginal bleeding and vaginal discharge.   Musculoskeletal: Positive for arthralgias and myalgias. Negative for joint swelling.   Skin: Negative for rash.   Neurological: Negative for dizziness, weakness, headaches and paresthesias.   Psychiatric/Behavioral: Positive for mood changes. The patient is nervous/anxious.           OBJECTIVE:   /65   Pulse 88   Temp 99.4  F (37.4  C) (Tympanic)   Resp 16   Ht 1.638 m (5' 4.5\")   Wt 70.3 kg (155 lb)   LMP 02/07/2023   SpO2 100%   BMI 26.19 kg/m    Physical Exam  GENERAL: healthy, alert and no distress  EYES: Eyes grossly normal to inspection, PERRL and conjunctivae and sclerae normal  HENT: ear canals and TM's normal, nose and mouth without ulcers or lesions  NECK: no adenopathy, no asymmetry, masses, or scars and thyroid normal to palpation  RESP: lungs clear to auscultation - no rales, rhonchi or wheezes  BREAST: normal without masses, tenderness or nipple discharge and no palpable axillary masses or adenopathy  CV: regular rate and rhythm, normal S1 S2, no S3 or S4, no murmur, click or rub, no peripheral edema and peripheral pulses strong  ABDOMEN: soft, nontender, no hepatosplenomegaly, no masses and bowel sounds normal  MS: no gross musculoskeletal defects noted, no edema  SKIN: no suspicious lesions or rashes  NEURO: Normal strength and tone, mentation intact and speech normal  PSYCH: mentation appears normal, affect normal/bright  Normal GYN exam Diagnostic Test Results:  Labs reviewed in Epic    ASSESSMENT/PLAN:   (Z00.00) Well adult exam  (primary encounter diagnosis)  Comment:    Plan:      (R10.2) Pelvic pain in female  Comment: Right-sided pelvic pain persistent over the last 3 months we will send her pelvic ultrasound her exam today is unremarkable  Plan: US Pelvic Complete with Transvaginal             (F51.01) Primary insomnia  Comment: Lots of difficulty with insomnia has " worked with psychiatry has had lots of difficulty with medication intolerance we will send for GeneSight testing and sleep evaluation  Patient has a history of PTSD and we discussed possibly thinking about different therapies for this such as EMDR I gave her a list of some therapist to consider  Plan: Adult Genetics & Metabolism Referral, Adult         Sleep Eval & Management  Referral            (M79.10) Myalgia  Comment:   Plan: CBC with platelets, Comprehensive metabolic         panel (BMP + Alb, Alk Phos, ALT, AST, Total.         Bili, TP), Vitamin D Deficiency, TSH with free         T4 reflex, Iron and iron binding capacity,         Ferritin            (Z11.59) Need for hepatitis C screening test  Comment:   Plan: Hepatitis C Screen Reflex to HCV RNA Quant and         Genotype            (Z12.4) Cervical cancer screening  Comment:   Plan: Pap Screen with HPV - recommended age 30 - 65         years            (F33.1) Major depressive disorder, recurrent, moderate (H)  Comment:   Plan: This is stable again awaiting GeneSight testing to see if certain options might be better    Patient has been advised of split billing requirements and indicates understanding: Yes      COUNSELING:  Reviewed preventive health counseling, as reflected in patient instructions        She reports that she has never smoked. She has never used smokeless tobacco.      Iesla Clemons MD  [facility]

## 2023-03-03 LAB
DEPRECATED CALCIDIOL+CALCIFEROL SERPL-MC: 63 UG/L (ref 20–75)
HCV AB SERPL QL IA: NONREACTIVE

## 2023-03-06 LAB
BKR LAB AP GYN ADEQUACY: NORMAL
BKR LAB AP GYN INTERPRETATION: NORMAL
BKR LAB AP HPV REFLEX: NORMAL
BKR LAB AP PREVIOUS ABNORMAL: NORMAL
PATH REPORT.COMMENTS IMP SPEC: NORMAL
PATH REPORT.COMMENTS IMP SPEC: NORMAL
PATH REPORT.RELEVANT HX SPEC: NORMAL

## 2023-03-08 ENCOUNTER — TELEPHONE (OUTPATIENT)
Dept: CONSULT | Facility: CLINIC | Age: 38
End: 2023-03-08
Payer: COMMERCIAL

## 2023-03-08 LAB
HUMAN PAPILLOMA VIRUS 16 DNA: NEGATIVE
HUMAN PAPILLOMA VIRUS 18 DNA: NEGATIVE
HUMAN PAPILLOMA VIRUS FINAL DIAGNOSIS: NORMAL
HUMAN PAPILLOMA VIRUS OTHER HR: NEGATIVE

## 2023-03-15 ENCOUNTER — TELEPHONE (OUTPATIENT)
Dept: FAMILY MEDICINE | Facility: CLINIC | Age: 38
End: 2023-03-15
Payer: COMMERCIAL

## 2023-03-15 DIAGNOSIS — F41.1 GENERALIZED ANXIETY DISORDER: Primary | ICD-10-CM

## 2023-03-16 NOTE — TELEPHONE ENCOUNTER
I signed referral fr talk therapy - let me know if pt would like to talk about medications    SN  
Patient called back  Gave her referral information.    Yuki Traore RN    
SN,   Please see message below   Referral pending        Myles KELLY RN   [facility]    
Triage,   Petra called.   Requesting referral to behavioral health.   Wants to stay within the [facility] system.   Thanks!  Viktoriya REED  
VM left asking patient to call back.  Yuki Traore RN    
Detail Level: Zone

## 2023-03-20 NOTE — RESULT ENCOUNTER NOTE
Hello,    The electrolytes are normal.  Your blood sugar was a little elevated which is not significant however if you have a strong history of diabetes we will continue to watch this but 105 blood sugar is just fine.  No signs of hepatitis C, vitamin D levels were excellent and your thyroid levels were perfect.  The iron levels were excellent the ferritin could be a little higher this is your stored iron so try taking orange juice along with an iron rich meal or supplement and this will help you absorb the iron that you are eating best.  The complete blood count is excellent no signs of anemia.    Isela Clemons MD

## 2023-03-28 ENCOUNTER — MYC MEDICAL ADVICE (OUTPATIENT)
Dept: FAMILY MEDICINE | Facility: CLINIC | Age: 38
End: 2023-03-28
Payer: COMMERCIAL

## 2023-03-28 DIAGNOSIS — F41.1 GENERALIZED ANXIETY DISORDER: Primary | ICD-10-CM

## 2023-04-12 ENCOUNTER — VIRTUAL VISIT (OUTPATIENT)
Dept: PHARMACY | Facility: CLINIC | Age: 38
End: 2023-04-12
Payer: COMMERCIAL

## 2023-04-12 DIAGNOSIS — Z78.9 TAKES DIETARY SUPPLEMENTS: ICD-10-CM

## 2023-04-12 DIAGNOSIS — F41.1 GENERALIZED ANXIETY DISORDER: Primary | ICD-10-CM

## 2023-04-12 DIAGNOSIS — F32.81 PMDD (PREMENSTRUAL DYSPHORIC DISORDER): ICD-10-CM

## 2023-04-12 PROCEDURE — 99207 PR NO CHARGE LOS: CPT | Mod: VID | Performed by: PHARMACIST

## 2023-04-12 RX ORDER — MULTIVITAMIN,THERAPEUTIC
1 TABLET ORAL DAILY
COMMUNITY

## 2023-04-12 RX ORDER — GABAPENTIN 100 MG/1
100 CAPSULE ORAL AT BEDTIME
Qty: 30 CAPSULE | Refills: 0 | Status: SHIPPED | OUTPATIENT
Start: 2023-04-12 | End: 2023-04-25 | Stop reason: SINTOL

## 2023-04-12 RX ORDER — ZINC GLUCONATE 50 MG
50 TABLET ORAL DAILY PRN
COMMUNITY

## 2023-04-12 RX ORDER — FAMOTIDINE 20 MG
1000 TABLET ORAL DAILY
COMMUNITY

## 2023-04-12 RX ORDER — ASCORBIC ACID 500 MG
1 POWDER IN PACKET (EA) ORAL DAILY PRN
COMMUNITY

## 2023-04-12 NOTE — Clinical Note
Hello,  I met with this patient for MTM to review meds per psychiatry intake. They are currently not scheduling past June, so I am happy to work with her in the meantime. I started gabapentin 100mg at bedtime for her today.  See note for details and explanations and please let me know if you have any questions. Chani Hernandez, PharmD Medication Therapy Management Pharmacist [facility]

## 2023-04-12 NOTE — PATIENT INSTRUCTIONS
"Recommendations from today's MTM visit:                                                    MTM (medication therapy management) is a service provided by a clinical pharmacist designed to help you get the most of out of your medicines.      Start taking gabapentin 100mg at bedtime.    Follow-up: 4/25/23 at 8am    It was great speaking with you today.  I value your experience and would be very thankful for your time in providing feedback in our clinic survey. In the next few days, you may receive an email or text message from [employer] with a link to a survey related to your  clinical pharmacist.\"     To schedule another MTM appointment, please call the clinic directly or you may call the MTM scheduling line at [phone] or toll-free at [phone].     My Clinical Pharmacist's contact information:                                                      Please feel free to contact me with any questions or concerns you have.      Chani Hernandez, PharmD  Medication Therapy Management Pharmacist  [facility]  "

## 2023-04-12 NOTE — PROGRESS NOTES
Medication Therapy Management (MTM) Encounter    ASSESSMENT:                            Medication Adherence/Access: No issues identified    PMDD/Anxiety: Overall, patient seems to have become quite sensitive to side effects and having atypical side effects to meds as she's gotten older. She seems to especially notice the more stimulating side effects of medications. SSRI/SNRI would be valuable in treating PMDD and could consider paroxetine, which would be the least stimulating option, but can cause other side effects (drowsiness, constipation). Discussed that outside of those classes of medications, many meds for anxiety are not helpful for depression, though she would like to try a different option since anxiety treatment is her current priority. Considered buspirone, gabapentin, clonidine, propranolol. Buspirone would have benefit in general anxiety and would be taken daily. Propranolol could be considered as patient has many physical anxiety symptoms, though BP already runs on low end, HR was ok. Gabapentin or clonidine are options to help with anxiety and possibly sleep.  Due do potential impact on blood pressure, especially if sensitive to side effects, would not choose clonidine at this time. Reviewed gabapentin and potential for drowsiness, lightheadedness, but low likelihood of causing stimulation. Agreeable to starting low dose at bedtime and may increase or use during daytime if well tolerated.    Also discussed pharmacogenetic testing, as she is scheduled to meet with genetic counselor and order testing in the coming weeks. Discussed that it will not be able to say why she has had such effects across medication classes, but may help as one piece of the puzzle in understanding if she may metabolize medications more quickly or slowly than expected. Furthermore, most data is for CYP2D6 and [id number] and there is not quality evidence for all enzymes tested. She plans to discuss further with counselor at that  "visit.    Supplements: Stable. Continue current medication.    PLAN:                            1. Start gabapentin 100mg at bedtime    Follow-up: 4/25/23 at 8am    SUBJECTIVE/OBJECTIVE:                          Petra Patel is a 37 year old female contacted via secure video for an initial visit. She was referred to me from psychiatry intake.      Reason for visit: med review.    Allergies/ADRs: Reviewed in chart  Past Medical History: Reviewed in chart  Tobacco: She reports that she has never smoked. She has never used smokeless tobacco.  Alcohol: infrequent    Medication Adherence/Access: no issues reported    PMDD/Anxiety: Pt reported that she's been seeing psychiatry for 5 years, but her insurance changed and provider was no longer covered, so seeking new care. Psychiatry clinic is not able to schedule right now, but she is set to see therapist in July.  She is currently taking lorazepam 0.5-1mg at bedtime, only every other night to avoid dependence. Does not feel sedated and works very well to interrupt panic. She falls asleep within 15 min on those nights and sleeps for 8 hours. On the other nights, she takes melatonin 10mg and CBD, which is better than nothing, though takes longer to fall asleep.  She will sleep a max of 4 hours and not be able to get back to sleep.   She notes that depression and anxiety symptoms are present during the day, but very significant at night and much worse during the 2 weeks prior to getting her period. Severity of that month's symptoms can be variable and better months are strongly tied to better diet, less alcohol, and more exercise during the month prior. The other two weeks has mood sx during the day, but feels much more bearable. She has 3 year old daughter and stays very busy, so \"doesn't have time to be depressed.\"  At night, she develops racing thoughts that make it difficult to relax for sleep and contribute to low mood (\"can't handle this anymore\"). During her worst " "times, she has terrible panic attacks, but feels better equipped to use non-pharm behavioral strategies during those times. Has \"threatened divorce,\" and regularly thinks about suicide during those two weeks. States she would not act on these thoughts now, (\"I'm in a much better mindset than in my 20s\"), though she has had 3 previous attempts. She described that she was drinking alcohol regularly and not taking care of herself well in her 20s. Most recent attempt was attempted hanging after drinking when someone said something \"that really bothered me.\"    12/2022 had COVID and developed \"terrible panic attacks\"  -States previous bipolar diagnosis before PMDD was diagnosed  No psych hospitalizations  3 suicide attempts-- most recent in 2015-attempted hanging after drinking    Past Medication Trials  -some caused increased anxiety, brain zaps, heart racing (sertraline, escitalopram)  -meds were not effective when younger; developed high sensitivity to side effects started during adulthood    Medications Max dose Dates/Duration Discontinuation Reason Other Notes   sertraline 100-150mg [id number]  First trials- in 20s, drinking    2nd trial-high anxiety attacks   escitalopram 1mg 6-12/2022 Seemed possibly helpful, but lots of sedation even with tiny dose Thinks she had 5mg tablets; was crushing and dissolving in 10oz of water, then drinking 2oz (~1mg?)   citalopram       bupropion   Visual and auditory hallucinations Added to lithium   mirtazapine 7.5mg 2017  For insomnia   lithium  1 month age 20  Thought bipolar   duloxetine  High school Didn't work    venlafaxine   Panic attack    quetiapine 12.5-25mg   Caused tired, but didn't stay asleep; given for panic when had COVID   clonazepam  Only tried few tablets     alprazolam   Felt breathing was very slow           magnesium    Tired, but causes nightmares if taken at night     Recalls an \"antidepressant that doesn't go through liver\" caused a lot of stimulation \"like I " "was on drugs\"  Not tried buspirone, gabapentin, lamotrigine, Depakote, Viibryd, Trintellix, clonidine, guanfacine, aripiprazole, risperidone, olanzapine    BP Readings from Last 3 Encounters:   03/02/23 103/65   11/17/22 104/60   07/14/21 111/73     Supplements: Pt is taking multivitamin, B complex, Vitamin D, zinc as needed, Vitamin C packet as needed.     Vitamin D Deficiency Screening Results:  Lab Results   Component Value Date    VITDT 63 03/02/2023     ----------------    I spent 50 minutes with this patient today. All changes were made via collaborative practice agreement with Isela Clemons MD. A copy of the visit note was provided to the patient's provider(s).    A summary of these recommendations was sent via [employer].    Chani Hernandez, PharmD  Medication Therapy Management Pharmacist  [facility]    Telemedicine Visit Details  Type of service:  Video Conference via [employer]  Start Time: 8:05am  End Time: 8:55am     Medication Therapy Recommendations  Generalized anxiety disorder    Current Medication: gabapentin (NEURONTIN) 100 MG capsule   Rationale: Untreated condition - Needs additional medication therapy - Indication   Recommendation: Start Medication - start gabapentin 100mg at bedtime   Status: Accepted per CPA                "

## 2023-04-25 ENCOUNTER — VIRTUAL VISIT (OUTPATIENT)
Dept: PHARMACY | Facility: CLINIC | Age: 38
End: 2023-04-25
Payer: COMMERCIAL

## 2023-04-25 DIAGNOSIS — F41.1 GENERALIZED ANXIETY DISORDER: Primary | ICD-10-CM

## 2023-04-25 DIAGNOSIS — F32.81 PMDD (PREMENSTRUAL DYSPHORIC DISORDER): ICD-10-CM

## 2023-04-25 PROCEDURE — 99207 PR NO CHARGE LOS: CPT | Mod: VID | Performed by: PHARMACIST

## 2023-04-25 NOTE — PROGRESS NOTES
"Medication Therapy Management (MTM) Encounter    ASSESSMENT:                            Medication Adherence/Access: No issues identified    MDD/Anxiety: No change and she did not tolerate gabapentin. Could consider starting much lower dose, but she is not interested in doing that. At last visit discussed trying buspirone, clonidine, or propranolol. Of those three, I do wonder about potential sensitivity to any blood pressure changes to the latter two, though buspirone is somewhat similar to SSRIs in its serotonergic activity.  Ultimately decided to try very low dose buspirone. Discussed differing mechanism in that it may take weeks for the medication to have any noticeable benefit. Will start by establishing tolerability and slowly increase for benefit.    PLAN:                            - Start taking buspirone 1mg once daily. I sent a prescription for liquid formulation [facility].     Follow-up: 2 weeks after starting med    SUBJECTIVE/OBJECTIVE:                          Petra Patel is a 37 year old female contacted via secure video for a follow-up visit.  Today's visit is a follow-up MTM visit from 4/12/23.     Reason for visit: med check.    Allergies/ADRs: Reviewed in chart  Past Medical History: Reviewed in chart  Tobacco: She reports that she has never smoked. She has never used smokeless tobacco.  Alcohol: infrequent    Medication Adherence/Access: no issues reported    Relevant background: significant medication sensitivity and has not tolerated many medications. Notably worse depression and anxiety in the two weeks prior to getting her period, though symptoms are better if she has maintained routine with regular exercise, healthy eating, no alcohol that month. Developed \"terrible panic attacks\" after having COVID 12/2022.  -States previous bipolar diagnosis before PMDD was diagnosed  No psych hospitalizations  3 suicide attempts-- most recent in 2015-attempted hanging after " alcohol intoxication    Therapist- July  No psychiatry    MDD/Anxiety: Pt continues taking lorazepam 0.5-1mg at bedtime every other night, alternating with CBD/melatonin in effort to avoid tolerance and dependence.   At last visit, patient elected to start gabapentin 100mg at bedtime to target racing thoughts at bedtime since she has significant intolerability to other medications. She reported feeling a wave of tingling in her brain within about 15 minutes after taking it, which caused her to feel panicked. She continued waking multiple times through the night in a panic. She was very tired and thinks it would have been helpful for sleep if she weren't in a panic. Not interested in trying it again.    No other change in mood. Still have anxiety during the day, but most notable at bedtime when she has racing thoughts and cannot calm for sleep.      Past Medication Trials  -some caused increased anxiety, brain zaps, heart racing (sertraline, escitalopram)  -meds were not effective when younger; developed high sensitivity to side effects started during adulthood    Medications Max dose Dates/Duration Discontinuation Reason Other Notes   sertraline 100-150mg [id number]  First trials- in 20s, drinking    2nd trial-high anxiety attacks   escitalopram 1mg 6-12/2022 Seemed possibly helpful, but lots of sedation even with tiny dose Thinks she had 5mg tablets; was crushing and dissolving in 10oz of water, then drinking 2oz (~1mg?)   citalopram       bupropion   Visual and auditory hallucinations Added to lithium   mirtazapine 7.5mg 2017  For insomnia   lithium  1 month age 20  Thought bipolar   duloxetine  High school Didn't work    venlafaxine   Panic attack    quetiapine 12.5-25mg   Caused tired, but didn't stay asleep; given for panic when had COVID   clonazepam  Only tried few tablets     alprazolam   Felt breathing was very slow    gabapentin 100mg 3/2023 x 1 dose Tingling wave in brain caused panic attack    magnesium    " Tired, but causes nightmares if taken at night     Recalls an \"antidepressant that doesn't go through liver\" caused a lot of stimulation \"like I was on drugs\"  Not tried buspirone, lamotrigine, Depakote, Viibryd, Trintellix, clonidine, guanfacine, aripiprazole, risperidone, olanzapine    Today's Vitals: LMP 02/07/2023   ----------------    I spent 20 minutes with this patient today. All changes were made via collaborative practice agreement with Isela Clemons MD. A copy of the visit note was provided to the patient's provider(s).    A summary of these recommendations was given to the patient.    Lisy LyleD  Medication Therapy Management Pharmacist  [facility]    Telemedicine Visit Details  Type of service:  Video Conference via [employer]  Start Time: 8:05am  End Time: 8:25am     Medication Therapy Recommendations  Generalized anxiety disorder    Current Medication: COMPOUNDED NON-CONTROLLED SUBSTANCE (CMPD RX) - PHARMACY TO MIX COMPOUNDED MEDICATION   Rationale: Untreated condition - Needs additional medication therapy - Indication   Recommendation: Start Medication - starting buspirone 1mg daily   Status: Accepted per CPA                "

## 2023-04-28 NOTE — PATIENT INSTRUCTIONS
"Recommendations from today's MTM visit:                                                    MTM (medication therapy management) is a service provided by a clinical pharmacist designed to help you get the most of out of your medicines.   Today we reviewed what your medicines are for, how to know if they are working, that your medicines are safe and how to make your medicine regimen as easy as possible.      - Start taking buspirone 1mg once daily. I sent a prescription for liquid formulation to [facility].     Follow-up: 2 weeks after starting med    It was great speaking with you today.  I value your experience and would be very thankful for your time in providing feedback in our clinic survey. In the next few days, you may receive an email or text message from [employer] with a link to a survey related to your  clinical pharmacist.\"     To schedule another MTM appointment, please call the clinic directly or you may call the MTM scheduling line at [phone] or toll-free at [phone].     My Clinical Pharmacist's contact information:                                                      Please feel free to contact me with any questions or concerns you have.      Chani Hernandez, PharmD  Medication Therapy Management Pharmacist  [facility]  "

## 2023-05-03 ENCOUNTER — VIRTUAL VISIT (OUTPATIENT)
Dept: PHARMACY | Facility: CLINIC | Age: 38
End: 2023-05-03
Payer: COMMERCIAL

## 2023-05-03 DIAGNOSIS — F32.81 PMDD (PREMENSTRUAL DYSPHORIC DISORDER): ICD-10-CM

## 2023-05-03 DIAGNOSIS — G47.00 INSOMNIA, UNSPECIFIED TYPE: ICD-10-CM

## 2023-05-03 DIAGNOSIS — F41.1 GENERALIZED ANXIETY DISORDER: Primary | ICD-10-CM

## 2023-05-03 PROCEDURE — 99607 MTMS BY PHARM ADDL 15 MIN: CPT | Performed by: PHARMACIST

## 2023-05-03 PROCEDURE — 99605 MTMS BY PHARM NP 15 MIN: CPT | Mod: VID | Performed by: PHARMACIST

## 2023-05-03 NOTE — Clinical Note
Hello! I've been seeing this patient for MTM since she was referred to psychiatry. I wonder what you think about her taking nightly lorazepam (or switching to clonazepam for longer effect). Please see my assessment for my thoughts. Ideally it would be for short term while buspirone is hopefully more effective. Thanks! Chani

## 2023-05-03 NOTE — PROGRESS NOTES
Medication Therapy Management (MTM) Encounter    ASSESSMENT:                            Medication Adherence/Access: No issues identified    Anxiety/PMDD/Insomnia: Unsure why she might have new onset of metallic taste without any changes in , as it doesn't make sense that she would notice it only when she has very poor sleep. Buspirone does appear to possibly be offering some benefit, as she reported noticeable feeling of calm in the morning after her dose. Unlikely to be related to lorazepam the night before, as she notices a change after the dose and the lorazepam would likely have been worn off by then.  Reviewed potential options including retrial with much lower dose trazodone to determine if it doesn't cause nightmares, though it was not effective at 50mg. May also consider doxepin, which could be helpful for sleep maintenance and may also benefit mood. If this is chosen, it is noted that taking with a higher fat meal increases bioavailability and delays peak serum concentration. Lastly, reviewed that she could take lorazepam nightly, since she knows this is effective, though is concerned for long term effects and dependence. Ideally, this would be a short term increase while buspirone could hopefully become more helpful for general anxiety and then she would tolerate lowering the lorazepam dose. May consider switching to clonazepam for longer acting effect and more help with sleep maintenance.  In the meantime, she will discontinue taking melatonin.    PLAN:                            Will discuss options with PCP.    Follow-up: 2-4 weeks    SUBJECTIVE/OBJECTIVE:                          Petra Patel is a 37 year old female contacted via secure video for a follow-up visit.  Today's visit is a follow-up MTM visit from 4/25/23     Reason for visit: med check.    Allergies/ADRs: Reviewed in chart  Past Medical History: Reviewed in chart  Tobacco: She reports that she has never smoked. She has  never used smokeless tobacco.  Alcohol: not currently using    Medication Adherence/Access: no issues reported    Anxiety/PMDD/Insomnia: Since last visit, patient has started taking compounded buspirone 1mg daily.  She initially felt very tired from the med, then divided the dose and took it at bedtime, but was in a state of very light sleep all night. She has since moved the full dose to morning.    She continues alternating nights taking lorazepam 1mg about 2 hours before bedtime then melatonin on other nights. Since starting buspirone, she has noticed a pattern that after taking melatonin, she will wake with a strong metallic taste in her mouth that lasts the entire next day. It continues to not be very helpful for sleep and she is feeling awful the next day. She had to leave work early today due to feeling so hungover from lack of sleep. She has not gotten any new manufacturers of her medications or OTCs. She did get her period today and does have known pattern of worsened insomnia in that time period, so expected to improve in the next few days.   After taking lorazepam, she may sleep up to 5 hours, then have difficulty getting back to sleep. She does not have metallic taste and morning buspirone the next day seems to calm her baseline anxiety.     Past Medication Trials  -some caused increased anxiety, brain zaps, heart racing (sertraline, escitalopram)  -meds were not effective when younger; developed high sensitivity to side effects started during adulthood    Medications Max dose Dates/Duration Discontinuation Reason Other Notes   sertraline 100-150mg [id number]  First trials- in 20s, drinking    2nd trial-high anxiety attacks   escitalopram 1mg 6-12/2022 Seemed possibly helpful, but lots of sedation even with tiny dose Thinks she had 5mg tablets; was crushing and dissolving in 10oz of water, then drinking 2oz (~1mg?)   citalopram       bupropion   Visual and auditory hallucinations Added to lithium  "  mirtazapine 7.5mg 2017  For insomnia   lithium  1 month age 20  Thought bipolar   duloxetine  High school Didn't work    venlafaxine   Panic attack    quetiapine 12.5-25mg   Caused tired, but didn't stay asleep; given for panic when had COVID   clonazepam  Only tried few tablets     alprazolam   Felt breathing was very slow    trazodone 50mg 12/2022 x 1 Not helpful; caused nightmares    hydroxyzine 50mg  Cognitive impairment Helpful for falling asleep, but not staying asleep   magnesium    Tired, but causes nightmares if taken at night     Recalls an \"antidepressant that doesn't go through liver\" caused a lot of stimulation \"like I was on drugs\"  Not tried buspirone, gabapentin, lamotrigine, Depakote, Viibryd, Trintellix, clonidine, guanfacine, aripiprazole, risperidone, olanzapine  ----------------    I spent 20 minutes with this patient today. All changes were made via collaborative practice agreement with Isela Clemons MD. A copy of the visit note was provided to the patient's provider(s).    A summary of these recommendations was given to the patient.    Lisy LyleD  Medication Therapy Management Pharmacist  [facility]    Telemedicine Visit Details  Type of service:  Video Conference via [employer]  Start Time: 12:35pm  End Time: 12:55pm     Medication Therapy Recommendations  Generalized anxiety disorder    Current Medication: LORazepam (ATIVAN) 1 MG tablet   Rationale: Dose too low - Dosage too low - Effectiveness   Recommendation: Increase Frequency - consider taking nightly   Status: Contact Provider - Awaiting Response         Insomnia, unspecified type    Rationale: No medical indication at this time - Unnecessary medication therapy - Indication   Recommendation: Discontinue Medication - stop taking melatonin   Status: Patient Agreed - Adherence/Education                "

## 2023-05-03 NOTE — PATIENT INSTRUCTIONS
"Recommendations from today's MTM visit:                                                    MTM (medication therapy management) is a service provided by a clinical pharmacist designed to help you get the most of out of your medicines.   Today we reviewed what your medicines are for, how to know if they are working, that your medicines are safe and how to make your medicine regimen as easy as possible.      I will talk to primary care about med change options    Follow-up: 2-4 weeks    It was great speaking with you today.  I value your experience and would be very thankful for your time in providing feedback in our clinic survey. In the next few days, you may receive an email or text message from [employer] with a link to a survey related to your  clinical pharmacist.\"     To schedule another MTM appointment, please call the clinic directly or you may call the MTM scheduling line at [phone] or toll-free at [phone].     My Clinical Pharmacist's contact information:                                                      Please feel free to contact me with any questions or concerns you have.      Chani Hernandez, PharmD  Medication Therapy Management Pharmacist  [facility]  "

## 2023-05-08 DIAGNOSIS — F41.1 GENERALIZED ANXIETY DISORDER: Primary | ICD-10-CM

## 2023-05-08 NOTE — TELEPHONE ENCOUNTER
----- Message from Chani Hernandez PharmD sent at 5/8/2023 10:36 AM CDT -----  Regarding: RE: Medication change  Yes, please! I am unable to sign controlled substances. She is usually taking lorazepam 1mg every other night.  If you agree with trying clonazepam, lorazepam 1mg is about equal to clonazepam 0.5mg.    Thank you!  Chani  ----- Message -----  From: Isela Clemons MD  Sent: 5/8/2023   8:39 AM CDT  To: Chani Hernandez PharmD  Subject: RE: Medication change                            Hello,    Yes I think that sounds OK  do you need me to sign that med?    SN  ----- Message -----  From: Chani Hernandez PharmD  Sent: 5/4/2023   9:31 PM CDT  To: Isela Clemons MD  Subject: Medication change                                Hello,    I sent you a chart for this patient on 5/3, but wanted to follow up with a staff message in case you don't see those regularly.     I've been seeing this patient for MTM since she was referred to psychiatry. I wonder what you think about her taking nightly lorazepam (or switching to clonazepam for longer effect). Please see my assessment for my thoughts. Ideally it would be for short term while buspirone is hopefully more effective.   Thanks!   Chani Hernandez PharmD  Medication Therapy Management Pharmacist  [facility]

## 2023-05-16 RX ORDER — CLONAZEPAM 0.5 MG/1
0.5 TABLET ORAL
Qty: 20 TABLET | Refills: 0 | Status: SHIPPED | OUTPATIENT
Start: 2023-05-16 | End: 2023-05-31

## 2023-05-17 ASSESSMENT — SLEEP AND FATIGUE QUESTIONNAIRES
HOW LIKELY ARE YOU TO NOD OFF OR FALL ASLEEP WHILE SITTING AND TALKING TO SOMEONE: WOULD NEVER DOZE
HOW LIKELY ARE YOU TO NOD OFF OR FALL ASLEEP IN A CAR, WHILE STOPPED FOR A FEW MINUTES IN TRAFFIC: WOULD NEVER DOZE
HOW LIKELY ARE YOU TO NOD OFF OR FALL ASLEEP WHILE SITTING INACTIVE IN A PUBLIC PLACE: WOULD NEVER DOZE
HOW LIKELY ARE YOU TO NOD OFF OR FALL ASLEEP WHILE WATCHING TV: WOULD NEVER DOZE
HOW LIKELY ARE YOU TO NOD OFF OR FALL ASLEEP WHILE SITTING AND READING: WOULD NEVER DOZE
HOW LIKELY ARE YOU TO NOD OFF OR FALL ASLEEP WHILE SITTING QUIETLY AFTER LUNCH WITHOUT ALCOHOL: WOULD NEVER DOZE
HOW LIKELY ARE YOU TO NOD OFF OR FALL ASLEEP WHILE LYING DOWN TO REST IN THE AFTERNOON WHEN CIRCUMSTANCES PERMIT: SLIGHT CHANCE OF DOZING
HOW LIKELY ARE YOU TO NOD OFF OR FALL ASLEEP WHEN YOU ARE A PASSENGER IN A CAR FOR AN HOUR WITHOUT A BREAK: WOULD NEVER DOZE

## 2023-05-18 ENCOUNTER — TELEPHONE (OUTPATIENT)
Dept: SLEEP MEDICINE | Facility: CLINIC | Age: 38
End: 2023-05-18

## 2023-05-18 ENCOUNTER — OFFICE VISIT (OUTPATIENT)
Dept: SLEEP MEDICINE | Facility: CLINIC | Age: 38
End: 2023-05-18
Payer: COMMERCIAL

## 2023-05-18 ENCOUNTER — TELEPHONE (OUTPATIENT)
Dept: FAMILY MEDICINE | Facility: CLINIC | Age: 38
End: 2023-05-18

## 2023-05-18 VITALS
BODY MASS INDEX: 24.89 KG/M2 | HEART RATE: 85 BPM | WEIGHT: 149.4 LBS | HEIGHT: 65 IN | DIASTOLIC BLOOD PRESSURE: 66 MMHG | SYSTOLIC BLOOD PRESSURE: 99 MMHG | OXYGEN SATURATION: 100 %

## 2023-05-18 DIAGNOSIS — F51.04 CHRONIC INSOMNIA: Primary | ICD-10-CM

## 2023-05-18 DIAGNOSIS — F51.04 INSOMNIA, PSYCHOPHYSIOLOGICAL: Primary | ICD-10-CM

## 2023-05-18 PROCEDURE — 99204 OFFICE O/P NEW MOD 45 MIN: CPT | Performed by: INTERNAL MEDICINE

## 2023-05-18 RX ORDER — RAMELTEON 8 MG/1
8 TABLET ORAL AT BEDTIME
Qty: 30 TABLET | Refills: 2 | Status: SHIPPED | OUTPATIENT
Start: 2023-05-18 | End: 2023-05-31

## 2023-05-18 NOTE — PATIENT INSTRUCTIONS
Cognitive behavioral treatment (CBT) for insomnia is a very effective technique that involves changing your behaviors and thoughts associated with sleep.  People that are motivated to make changes in their sleep pattern are likely to benefit from self-help strategies for treatment of insomnia.      Several treatment books for insomnia are recommended for your consideration.These resources are intended to guide you through a process of change that may take 4-6 weeks to complete.    Suggested Resource Insomnia Treatment Books   Overcoming Insomnia:  A Cognitive Behavioral Therapy  Approach Workbook by Abdirahman Aguilar and Remedios Cunha (2008)    Say Tereza to Insomnia by Yossi Meadows (2009)     Quiet Your Mind and Get to Sleep:  Solutions to Insomnia for Those with Depression, Anxiety or Chronic Pain by Remedios Cunha and Sofia Jensen (2009)

## 2023-05-18 NOTE — TELEPHONE ENCOUNTER
PA Initiation    Medication: RAMELTEON 8 MG PO TABS  Insurance Company: Other (see comments)Comment:  [facility]  Pharmacy Filling the Rx: [id number] - [place], [place] - [address]  Filling Pharmacy Phone: [phone]  Filling Pharmacy Fax: [phone]  Start Date: 5/18/2023

## 2023-05-18 NOTE — NURSING NOTE
"Chief Complaint   Patient presents with     Sleep Problem     insominia       Initial BP 99/66   Pulse 85   Ht 1.638 m (5' 4.5\")   Wt 67.8 kg (149 lb 6.4 oz)   SpO2 100%   BMI 25.25 kg/m   Estimated body mass index is 25.25 kg/m  as calculated from the following:    Height as of this encounter: 1.638 m (5' 4.5\").    Weight as of this encounter: 67.8 kg (149 lb 6.4 oz).    Medication Reconciliation: complete  ESS 1  Neck circumference:  37 centimeters.  Austin Fuentes MA      "

## 2023-05-18 NOTE — PROGRESS NOTES
Sleep Consultation:    Date on this visit: 5/18/2023    Petra Patel  is referred by Isela Clemons for a sleep consultation.     Primary Physician: Isela Clemons     Petra Patel presents to clinic for evaluation and management of chronic insomnia.     Her medical history is significant for PMDD, alcohol dependence, in remission & depression/ anxiety.     She is  and lives with her  and 4 year old daughter. She works as a .     Her current medications include Buspirone 2 mg daily & Lorazepam 1 mg at night.     Previous medication trials include Quetiapine, Trazodone, Hydroxyzine, all of which contributed to significant adverse effects.     Previous antidepressant trails include Sertraline, Escitalopram and multiple other antidepresanst that she doesnot remember.     Patent reports that her father was a drug dealer and alcoholic. She described her childhood as terrible and chaotic.  Her mother remarried.  Her mother had MS and worked full time. As the oldest of 4 children, she had to assume many of the responsibility for caring for her siblings.  Her biological father apparently murdered his second wife while he had taken zolpidem.    Her sleep difficulties were started in her 20s.  She reports that there was a time when she was drinking every day, passing out to sleep late night and experiencing grogginess throughout the day.  She was started on hypnotics after she stopped drinking.  Lorazepam was initially started as a as needed medication for sleep difficulty during premenstrual dysphoric disorder.  For the last few weeks, she has been taking it nightly.    She has difficulty falling asleep and staying asleep.      Petra goes to bed at 10 PM after taking Lorazepam 1 mg at 9 PM.  Sleep latency averages an hour.  If she takes lorazepam, she is able to have approximately 7 to 8 hours of sleep.  Otherwise, she will either wake up multiple times throughout the  "night or wake up in during the night and will be unable to return to sleep.     Petra does snore infrequently. Patient's  does not complain of snoring and gasping. She does not have witnessed apneas.They never sleep separately.  Patient sleeps on her back and side. She denies no morning dry mouth, morning headaches and restless legs.     Petra denies any bruxism, sleep walking, dream enactment, sleep paralysis and hypnogogic/hypnopompic hallucinations.    Petra has difficulty breathing through her nose.      Patient's Morgan Sleepiness score 1/24 consistent with no daytime sleepiness.      Petra naps 1-2 times per week for  minutes, feels refreshed after naps. She takes no inadvertant naps.  She denies closing eyes, dozing and falling asleep while driving.   Patient was counseled on the importance of driving while alert, to pull over if drowsy, or nap before getting into the vehicle if sleepy.      She uses no caffeine.     Past Medical/Surgical History:  Past Medical History:   Diagnosis Date     Anxiety      Asthma      Depressive disorder      LSIL (low grade squamous intraepithelial lesion) on Pap smear 9/2011    noncompliant with colp     Physical Examination:  Vitals: BP 99/66   Pulse 85   Ht 1.638 m (5' 4.5\")   Wt 67.8 kg (149 lb 6.4 oz)   SpO2 100%   BMI 25.25 kg/m    BMI= Body mass index is 25.25 kg/m .  GENERAL APPEARANCE: healthy, alert and no distress  HENT: nose and mouth without ulcers or lesions  NEURO: mentation intact and speech normal  PSYCH: mentation appears normal and affect normal/bright  Mallampati Class: II.  Tonsillar Stage: 1  hidden by pillars.    Impression/Plan:    1.  Chronic psychophysiologic insomnia    Patient is a 37 years old female with a BMI of 25, medical comorbidity of anxiety and depression, PMDD, alcohol dependence in remission, who presents for evaluation of chronic insomnia.  Predisposing factors include adverse experiences in childhood, " alcohol use disorder and history of anxiety and depression.  Previously failed trials of hypnotics include quetiapine, trazodone, hydroxyzine, which contributed to adverse effects in the day.  Melatonin is partially helpful.  Currently she is on lorazepam 1 mg at night, which is reported to be helpful, but contributes to daytime grogginess and sedation.  Antidepressants were not tolerated by her.  She has a future appointment for psychotherapy.    Patient's goal is to discontinue lorazepam as she is intolerant of daytime side effects with this medication.  Additionally, with a background of alcohol dependence, risk of benzodiazepine dependence is high in her situation.  A slow taper is advised.  She wants to consider an alternative hypnotic.    We reviewed potential risks of with long-term use of hypnotics.  Potential for complex sleep-related behaviors, morning impairment and habituation were discussed.  Considerations will be either Rozerem or suvorexant.  Rozerem will be a short acting but considering her history of partial response with melatonin, this could be a consideration.    We also discussed the role of cognitive behavioral therapy for insomnia.  Patient is open to a referral and this was made.  Unfortunately, we probably will not have an appointment with behavioral sleep medicine for a long time.  I did provide her with resources for treatment books for insomnia.    Plan:     1.  Start Rozerem 8 mg at bedtime  2.  Reduce lorazepam to 0.75 mg or 0.5 mg as tolerated by patient.  Further reduction after about 2 weeks with eventual discontinuation after another 2 weeks.  3.  Referral to behavioral sleep medicine for insomnia  4.  Patient plans to start psychotherapy for anxiety and depression.  She will continue management through primary care.  5.  Follow-up with me at the next available appointment.  She knows that she can update me regarding progress through [facility]      I spent a total of 50 minutes for  this appointment on this date of service which include time spent before, during and after the visit for chart review, patient care, counseling and coordination of care.    Dr. Lester Torrez       CC: Isela Clemons

## 2023-05-18 NOTE — TELEPHONE ENCOUNTER
KELLY BERNARD    Patient called   She was at the sleep clinic today  Her Blood pressure was 99/66  Patient was told to report this BP to her PCP  She denied feeling dizzy or any symptoms   Patient had smoothly for breakfast      ThanksMyles RN

## 2023-05-18 NOTE — TELEPHONE ENCOUNTER
Prior Authorization Specialty Medication Request    Medication/Dose: Ramelteon 8mg  ICD code (if different than what is on RX):  G47.01  Previously Tried and Failed: Quetiapine, Trazodone, Hydroxyzine, Sertraline, Escitalopram        Important Lab Values:   Rationale:     Insurance Name: [employer] [facility]  Insurance ID: [id number]  Insurance Phone Number:  [phone]      Pharmacy Information (if different than what is on RX)  Name:  [facility] [address] [place] [address]  Phone:  [phone]

## 2023-05-22 NOTE — TELEPHONE ENCOUNTER
Voice mail left to discuss alternate medication options. Suvorexant can be a consideration. As noted, with a history of alcohol dependence in the past, I will try to avoid Z-drugs.

## 2023-05-22 NOTE — TELEPHONE ENCOUNTER
PRIOR AUTHORIZATION DENIED    Medication: RAMELTEON 8 MG PO TABS  Insurance Company: Other (see comments)Comment:  [facility]  Denial Date: 5/22/2023  Denial Rationale: Medication is excluded under patient's prescription drug plan, no PA can be done.  Appeal Information: N/A- no PA or appeal can be done  Patient Notified: No

## 2023-05-22 NOTE — TELEPHONE ENCOUNTER
Insurance denied the PA for the patients Ramelteon.  States is not on there covered drugs plan and an alternative needs to be selected.

## 2023-05-30 ENCOUNTER — TELEPHONE (OUTPATIENT)
Dept: SLEEP MEDICINE | Facility: CLINIC | Age: 38
End: 2023-05-30
Payer: COMMERCIAL

## 2023-05-30 NOTE — TELEPHONE ENCOUNTER
Prior Authorization Specialty Medication Request    Medication/Dose: Suvorexant 5mg   ICD code (if different than what is on RX):  F51.04  Previously Tried and Failed:      Important Lab Values:   Rationale:     Insurance Name: [employer]  Insurance ID: [id number]  Insurance Phone Number: [phone]     Pharmacy Information (if different than what is on RX)  Name: [id number] - [place], [place] - [address]   Phone:  [phone]

## 2023-05-31 ENCOUNTER — VIRTUAL VISIT (OUTPATIENT)
Dept: PHARMACY | Facility: CLINIC | Age: 38
End: 2023-05-31
Payer: COMMERCIAL

## 2023-05-31 DIAGNOSIS — F32.81 PMDD (PREMENSTRUAL DYSPHORIC DISORDER): ICD-10-CM

## 2023-05-31 DIAGNOSIS — F41.1 GENERALIZED ANXIETY DISORDER: Primary | ICD-10-CM

## 2023-05-31 DIAGNOSIS — G47.00 INSOMNIA, UNSPECIFIED TYPE: ICD-10-CM

## 2023-05-31 PROCEDURE — 99606 MTMS BY PHARM EST 15 MIN: CPT | Performed by: PHARMACIST

## 2023-05-31 NOTE — PATIENT INSTRUCTIONS
"Recommendations from today's MTM visit:                                                    MTM (medication therapy management) is a service provided by a clinical pharmacist designed to help you get the most of out of your medicines.   Today we reviewed what your medicines are for, how to know if they are working, that your medicines are safe and how to make your medicine regimen as easy as possible.      Continue taking buspirone 2mg every morning and 1mg every afternoon.    Follow-up: I will send you a message in a few weeks to check in     It was great speaking with you today.  I value your experience and would be very thankful for your time in providing feedback in our clinic survey. In the next few days, you may receive an email or text message from [employer] with a link to a survey related to your  clinical pharmacist.\"     To schedule another MTM appointment, please call the clinic directly or you may call the MTM scheduling line at [phone] or toll-free at [phone].     My Clinical Pharmacist's contact information:                                                      Please feel free to contact me with any questions or concerns you have.      Chani Hernandez, PharmD  Medication Therapy Management Pharmacist  [facility]  "

## 2023-05-31 NOTE — PROGRESS NOTES
Medication Therapy Management (MTM) Encounter    ASSESSMENT:                            Medication Adherence/Access: No issues identified    Anxiety/PMDD/Insomnia: Buspirone has been helpful for improving overall anxiety and she has been tolerating slow dose increases. Will continue with current dose for another few weeks and then consider increasing afternoon dose from 1mg to 2mg. Continue follow up with Sleep and reducing lorazepam as directed. Continue CBT-I book.      PLAN:                            Continue current medications.     Follow-up: 3 weeks via My[employer]    SUBJECTIVE/OBJECTIVE:                          Jessica Patel is a 38 year old female called for a follow-up visit.  Today's visit is a follow-up MTM visit from 5/3/23.   Reason for visit: med check     Allergies/ADRs: Reviewed in chart  Past Medical History: Reviewed in chart  Tobacco: She reports that she has never smoked. She has never used smokeless tobacco.  Alcohol: not currently using    Medication Adherence/Access: no issues reported    Anxiety/PMDD/Insomnia: Pt has been taking buspirone 2mg every morning for about 3-4 weeks. Last week, patient sent [employer] message indicating that buspirone had been well tolerated, but she noticed it wearing off daily around 2pm. PMDD symptoms were much worse and she was struggling with constant racing thoughts and anxiety. We agreed to add a second dose of buspirone 1mg in the early afternoon.    She has been taking lorazepam 0.5mg every night for sleep, recently reduced from 0.75mg and did not notice a change. She is also going to bed later (11pm), as recommended by Sleep Medicine, which has seemed to help her fall asleep faster and wake up later into the night if she does wake. She had been alternating higher dose lorazepam and melatonin in effort to avoid dependence, but sleep was still quite poor with the melatonin. She was recently prescribed ramelteon, but was denied by insurance and now prescribed  "Belsomra, which is undergoing prior authorization. She plan to continue lorazepam 0.5mg for another week, then plans to reduce to 0.25mg.  Has been working through CBT-I book while awaiting appointment in December.    Today, she reported that she doesn't feel so wound up at the end of the day since starting that second dose, which she has been taking around 2pm. She described still having PMDD symptoms and feeling more anxious/emotional, but much better than last week when she was constantly have racing thoughts. Overall mood and anxiety seems much smoother with the afternoon dose.    Past Medication Trials  -some caused increased anxiety, brain zaps, heart racing (sertraline, escitalopram)  -meds were not effective when younger; developed high sensitivity to side effects started during adulthood    Medications Max dose Dates/Duration Discontinuation Reason Other Notes   buspirone  4/2023-current     sertraline 100-150mg [id number]  First trials- in 20s, drinking    2nd trial-high anxiety attacks   escitalopram 1mg 6-12/2022 Seemed possibly helpful, but lots of sedation even with tiny dose Thinks she had 5mg tablets; was crushing and dissolving in 10oz of water, then drinking 2oz (~1mg?)   citalopram       bupropion   Visual and auditory hallucinations Added to lithium   mirtazapine 7.5mg 2017  For insomnia   lithium  1 month age 20  Thought bipolar   duloxetine  High school Didn't work    venlafaxine   Panic attack    quetiapine 12.5-25mg   Caused tired, but didn't stay asleep; given for panic when had COVID   clonazepam  Only tried few tablets     alprazolam   Felt breathing was very slow    trazodone 50mg 12/2022 x 1 Not helpful; caused nightmares    hydroxyzine 50mg  Cognitive impairment Helpful for falling asleep, but not staying asleep   magnesium    Tired, but causes nightmares if taken at night     Recalls an \"antidepressant that doesn't go through liver\" caused a lot of stimulation \"like I was on drugs\"  Not " tried gabapentin, lamotrigine, Depakote, Viibryd, Trintellix, clonidine, guanfacine, aripiprazole, risperidone, olanzapine    Today's Vitals: There were no vitals taken for this visit.  ----------------    I spent 15 minutes with this patient today. All changes were made via collaborative practice agreement with Isela Clemons MD. A copy of the visit note was provided to the patient's provider(s).    A summary of these recommendations was given to the patient.    Chani Hernandez PharmD  Medication Therapy Management Pharmacist  [facility]    Telemedicine Visit Details  Type of service:  Telephone visit  Start Time: 8:00am  End Time: 8:15am     Medication Therapy Recommendations  Generalized anxiety disorder    Current Medication: LORazepam (ATIVAN) 1 MG tablet   Rationale: Dose too low - Dosage too low - Effectiveness   Recommendation: Increase Frequency - consider taking nightly   Status: No Longer Relevant

## 2023-06-01 ENCOUNTER — TELEPHONE (OUTPATIENT)
Dept: FAMILY MEDICINE | Facility: CLINIC | Age: 38
End: 2023-06-01
Payer: COMMERCIAL

## 2023-06-01 NOTE — TELEPHONE ENCOUNTER
Prior Authorization Follow Up    Called Dee  to check status of Belsomra    Spoke with representative who stated this medication is Excluded from coverage.  Alternatives available and rep is faxing over the denial letter including alternatives.  Will update encounter once received.

## 2023-06-01 NOTE — TELEPHONE ENCOUNTER
A prior authorization is needed for the following compounded medications prescribed.  Please complete a prior authorization with the information included below.    Medication:BUSPIRONE HCL 1MG CAPS    Ingredients                                                                  NDCs                                                Quantities                       BUSPIRONE POWD                                                   [id number]                                                 0.120  CARSORAL W/DYNAMIC STATIC                              [id number]                                              11.88  EMPTY CAPSULE                                                [id number]                                           120        RX #:[id number]  Reason for Rejection:1 Ingredient Not Covered;Ingr#2:NDC Not   on formulary; please try other NDCs if a  vailable.      Pharmacy Insurance plan:[employer]  BIN #:[id number]  ID #:[id number]  PCN #:[id number]  Phone #:[phone]      Pharmacy NPI:[id number]      Please advise the pharmacy when the prior authorization is approved or denied.     Thank you for your time.      Cristobal Garcia    [facility]   [address] [place] [address]   Phone: [phone]  Fax: [phone]

## 2023-06-02 ENCOUNTER — MYC MEDICAL ADVICE (OUTPATIENT)
Dept: SLEEP MEDICINE | Facility: CLINIC | Age: 38
End: 2023-06-02
Payer: COMMERCIAL

## 2023-06-02 RX ORDER — GABAPENTIN 100 MG/1
100 CAPSULE ORAL AT BEDTIME
Qty: 90 CAPSULE | Refills: 0 | Status: SHIPPED | OUTPATIENT
Start: 2023-06-02 | End: 2023-06-26 | Stop reason: SINTOL

## 2023-06-02 NOTE — TELEPHONE ENCOUNTER
PRIOR AUTHORIZATION DENIED    Medication: BELSOMRA 5 MG PO TABS  Insurance Company: Comment:  [employer]USHIL [phone]  Denial Date: 6/1/2023  Denial Rational: PLAN EXCLUSION          Appeal Information:   Patient Notified: No  Unfortunately, we cannot call the patient with denials because we do not know what next steps the MD will take nor can we give medical advice, please notify the patient of what they are to expect for the continuation of their therapy from the provider.

## 2023-06-08 NOTE — TELEPHONE ENCOUNTER
PA Initiation    Medication: BUSPIRONE HCL PO  Insurance Company: Other (see comments)  Pharmacy Filling the Rx: [facility] - [place], [place] - [address]  Filling Pharmacy Phone: [phone]  Filling Pharmacy Fax: [phone]  Start Date: 6/7/2023

## 2023-06-12 ENCOUNTER — TELEPHONE (OUTPATIENT)
Dept: SLEEP MEDICINE | Facility: CLINIC | Age: 38
End: 2023-06-12
Payer: COMMERCIAL

## 2023-06-12 NOTE — TELEPHONE ENCOUNTER
Phone call returned.  Patient has reduced the dose of lorazepam to 0.25 mg at night.  She has done well with the reduction of the dose and reports that she can sleep 7 or 8 hours with this dose.  Her sleep disturbance is particularly worse during the premenstrual time.  She will try restricting use of lorazepam to this period.     In her clinical setting, with a history of prior alcohol dependence, I do not recommend Z drugs.  She states that she cannot take gabapentin due to adverse effects.  She has failed trazodone, quetiapine and hydroxyzine in the past.    At this time, best approach will be to concentrate on cognitive behavioral therapy for insomnia.  She will try to get an earlier appointment with behavioral sleep medicine.

## 2023-06-19 DIAGNOSIS — F41.1 GENERALIZED ANXIETY DISORDER: ICD-10-CM

## 2023-06-19 NOTE — TELEPHONE ENCOUNTER
SN,     Routing refill request to provider for review/approval because:  Drug not on the [facility] refill protocol     Thanks!  JS Bueno

## 2023-06-21 NOTE — TELEPHONE ENCOUNTER
Prior Authorization Not Needed per Insurance    Medication: BUSPIRONE HCL PO  Insurance Company: Other (see comments)  Expected CoPay:      Pharmacy Filling the Rx: [facility] - [place], [place] - [address]  Pharmacy Notified: Yes  Patient Notified: Yes **Instructed pharmacy to notify patient when script is ready to /ship.**

## 2023-06-26 ENCOUNTER — VIRTUAL VISIT (OUTPATIENT)
Dept: PSYCHIATRY | Facility: CLINIC | Age: 38
End: 2023-06-26
Attending: FAMILY MEDICINE
Payer: COMMERCIAL

## 2023-06-26 DIAGNOSIS — F41.1 GENERALIZED ANXIETY DISORDER: ICD-10-CM

## 2023-06-26 PROBLEM — F32.81 PREMENSTRUAL DYSPHORIC DISORDER: Status: ACTIVE | Noted: 2023-03-02

## 2023-06-26 PROCEDURE — 99205 OFFICE O/P NEW HI 60 MIN: CPT | Mod: VID | Performed by: PSYCHIATRY & NEUROLOGY

## 2023-06-26 ASSESSMENT — PATIENT HEALTH QUESTIONNAIRE - PHQ9
SUM OF ALL RESPONSES TO PHQ QUESTIONS 1-9: 10
10. IF YOU CHECKED OFF ANY PROBLEMS, HOW DIFFICULT HAVE THESE PROBLEMS MADE IT FOR YOU TO DO YOUR WORK, TAKE CARE OF THINGS AT HOME, OR GET ALONG WITH OTHER PEOPLE: NOT DIFFICULT AT ALL
SUM OF ALL RESPONSES TO PHQ QUESTIONS 1-9: 10

## 2023-06-26 NOTE — Clinical Note
Hello!  Thank you for your consult on this patient.  Your expertise is much appreciated.  She is on a good regimen.  I agree with CBT IN December as well as maintaining nightly benzodiazepine/lorazepam with a differing dose depending on none PMDD versus PMDD episode 0.25 mg nightly generally with 0.5 mg used nightly for a PMDD episode.    Sincerely, Saad Garza M.D. Consultative Psychiatrist Program Medical Director, [facility]
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
## 2023-06-26 NOTE — Clinical Note
Dr. Clemons,  Thank you for your consult and care of the patient.  Patient is experiencing remission of symptoms with buspirone as well as lorazepam 0.25 mg during non-PMDD episodes and 0.5 mg nightly during PMDD episodes.  She will be doing CBT to see if outside of PMDD episodes to be able to sleep without benzodiazepine treatment.  The risks outweigh the benefits of trying other new medications.  Sincerely, Saad Garza M.D. Consultative Psychiatrist Program Medical Director, [facility]

## 2023-06-26 NOTE — NURSING NOTE
Is the patient currently in the state of [place]? YES    Visit mode:VIDEO    If the visit is dropped, the patient can be reconnected by: VIDEO VISIT: Text to cell phone:   Telephone Information:   Mobile [phone]       Will anyone else be joining the visit? No  (If patient encounters technical issues they should call [phone])    How would you like to obtain your AVS? MyChart    Are changes needed to the allergy or medication list? YES no longer taking gabapentin 100 mg     Rooming Documentation: Assigned questionnaire(s) completed . and Attendance Guidelines - Care team has reviewed attendance agreement with patient. Patient advised that two failed appointments within 6 months may lead to termination of current episode of care.      Reason for visit: Consult     EMILY Mcfadden

## 2023-07-24 ASSESSMENT — ANXIETY QUESTIONNAIRES
3. WORRYING TOO MUCH ABOUT DIFFERENT THINGS: NEARLY EVERY DAY
1. FEELING NERVOUS, ANXIOUS, OR ON EDGE: NEARLY EVERY DAY
GAD7 TOTAL SCORE: 19
5. BEING SO RESTLESS THAT IT IS HARD TO SIT STILL: SEVERAL DAYS
6. BECOMING EASILY ANNOYED OR IRRITABLE: NEARLY EVERY DAY
IF YOU CHECKED OFF ANY PROBLEMS ON THIS QUESTIONNAIRE, HOW DIFFICULT HAVE THESE PROBLEMS MADE IT FOR YOU TO DO YOUR WORK, TAKE CARE OF THINGS AT HOME, OR GET ALONG WITH OTHER PEOPLE: SOMEWHAT DIFFICULT
GAD7 TOTAL SCORE: 19
4. TROUBLE RELAXING: NEARLY EVERY DAY
2. NOT BEING ABLE TO STOP OR CONTROL WORRYING: NEARLY EVERY DAY
7. FEELING AFRAID AS IF SOMETHING AWFUL MIGHT HAPPEN: NEARLY EVERY DAY

## 2023-07-27 ENCOUNTER — VIRTUAL VISIT (OUTPATIENT)
Dept: PSYCHOLOGY | Facility: CLINIC | Age: 38
End: 2023-07-27
Payer: COMMERCIAL

## 2023-07-27 ENCOUNTER — FCC EXTENDED DOCUMENTATION (OUTPATIENT)
Dept: PSYCHOLOGY | Facility: CLINIC | Age: 38
End: 2023-07-27
Payer: COMMERCIAL

## 2023-07-27 DIAGNOSIS — F41.1 GENERALIZED ANXIETY DISORDER: Primary | ICD-10-CM

## 2023-07-27 PROCEDURE — 90834 PSYTX W PT 45 MINUTES: CPT | Mod: VID | Performed by: SOCIAL WORKER

## 2023-07-27 ASSESSMENT — PATIENT HEALTH QUESTIONNAIRE - PHQ9
10. IF YOU CHECKED OFF ANY PROBLEMS, HOW DIFFICULT HAVE THESE PROBLEMS MADE IT FOR YOU TO DO YOUR WORK, TAKE CARE OF THINGS AT HOME, OR GET ALONG WITH OTHER PEOPLE: SOMEWHAT DIFFICULT
SUM OF ALL RESPONSES TO PHQ QUESTIONS 1-9: 10
SUM OF ALL RESPONSES TO PHQ QUESTIONS 1-9: 10

## 2023-07-27 NOTE — PROGRESS NOTES
[facility]   Mental Health & Addiction Services     Progress Note - Initial Visit    Patient  Name:  Petra Patel Date: 2023         Service Type: Individual     Visit Start Time: 100  Visit End Time: 152    Visit #: 1    Attendees: Client attended alone    Service Modality:  Video Visit:      Provider verified identity through the following two step process.  Patient provided:  Patient  and Patient address    Telemedicine Visit: The patient's condition can be safely assessed and treated via synchronous audio and visual telemedicine encounter.      Reason for Telemedicine Visit: Patient has requested telehealth visit    Originating Site (Patient Location): Patient's home    Distant Site (Provider Location): Provider Remote Setting- Home Office    Consent:  The patient/guardian has verbally consented to: the potential risks and benefits of telemedicine (video visit) versus in person care; bill my insurance or make self-payment for services provided; and responsibility for payment of non-covered services.     Patient would like the video invitation sent by:  My Chart    Mode of Communication:  Video Conference via [employer]well    Distant Location (Provider):  Off-site    As the provider I attest to compliance with applicable laws and regulations related to telemedicine.       DATA:   Interactive Complexity: No   Crisis: No     Presenting Concerns/  Current Stressors:   PMDD, history of trauma and depressive symptoms, Anxiety      ASSESSMENT:  Mental Status Assessment:  Appearance:   Appropriate   Eye Contact:   Good   Psychomotor Behavior: Normal   Attitude:   Cooperative  Pleasant Guarded   Orientation:   All  Speech   Rate / Production: Normal/ Responsive   Volume:  Normal   Mood:    Normal  Affect:    Blunted   Thought Content:  Clear   Thought Form:  Coherent   Insight:    Good       Safety Issues and Plan for Safety and Risk Management:   [place] Suicide Severity Rating Scale (Short  "Version)      12/16/2022    12:33 AM   [place] Suicide Severity Rating (Short Version)   Over the past 2 weeks have you felt down, depressed, or hopeless? yes   Over the past 2 weeks have you had thoughts of killing yourself? no   Have you ever attempted to kill yourself? no     Patient denies current fears or concerns for personal safety.  Patient denies current or recent suicidal ideation or behaviors.  Patient denies current or recent homicidal ideation or behaviors.  Patient denies current or recent self injurious behavior or ideation.  Patient reports other safety concerns including history of fleeting suicidal ideation associated with luteal phase of her period, understands why this is happening and is able to manage thoughts, states \"I would never act on the thoughts, I know they are related to my hormones.\"  Recommended that patient call 911 or go to the local ED should there be a change in any of these risk factors.  Patient reports there are no firearms in the house.     Diagnostic Criteria:  Generalized Anxiety Disorder  A. Excessive anxiety and worry about a number of events or activities (such as work or school performance).   B. The person finds it difficult to control the worry.  C. Select 3 or more symptoms (required for diagnosis). Only one item is required in children.   - Restlessness or feeling keyed up or on edge.    - Being easily fatigued.    - Irritability.    - Sleep disturbance (difficulty falling or staying asleep, or restless unsatisfying sleep).   D. The focus of the anxiety and worry is not confined to features of an Axis I disorder.  E. The anxiety, worry, or physical symptoms cause clinically significant distress or impairment in social, occupational, or other important areas of functioning.   F. The disturbance is not due to the direct physiological effects of a substance (e.g., a drug of abuse, a medication) or a general medical condition (e.g., hyperthyroidism) and does not occur " exclusively during a Mood Disorder, a Psychotic Disorder, or a Pervasive Developmental Disorder.      DSM5 Diagnoses: (Sustained by DSM5 Criteria Listed Above)  Diagnoses: 300.02 (F41.1) Generalized Anxiety Disorder; PMDD  Psychosocial & Contextual Factors: Jessica is a 38 year old  woman, she is  and the mother of a 4 year old daughter.  Working and going to school.  She is struggling with relationship with spouse at this time.  WHODAS 2.0 (12 item):        No data to display              Intervention:   Educated on treatment planning and started identifying goals and interventions for treatment plan  Collateral Reports Completed:  Not Applicable      PLAN: (Homework, other):  1. Provider will continue Diagnostic Assessment.  Patient was given the following to do until next session:  Reach out as needed or if there are any questions.    2. Provider recommended the following referrals: None at this time, works with OBGYN, pharmacist, and sleep medicine.      3.  Suicide Risk and Safety Concerns were assessed for Petra Patel.    Patient meets the following risk assessment and triage: Patient has no current safety concerns, committed to keeping self safe.      Isela Karimi, [facility]  July 27, 2023      Answers submitted by the patient for this visit:  Patient Health Questionnaire (Submitted on 7/27/2023)  If you checked off any problems, how difficult have these problems made it for you to do your work, take care of things at home, or get along with other people?: Somewhat difficult  PHQ9 TOTAL SCORE: 10  MICHELLE-7 (Submitted on 7/24/2023)  MICHELLE 7 TOTAL SCORE: 19

## 2023-08-04 ENCOUNTER — VIRTUAL VISIT (OUTPATIENT)
Dept: PSYCHOLOGY | Facility: CLINIC | Age: 38
End: 2023-08-04
Payer: COMMERCIAL

## 2023-08-04 DIAGNOSIS — F32.81 PMDD (PREMENSTRUAL DYSPHORIC DISORDER): Primary | ICD-10-CM

## 2023-08-04 PROCEDURE — 90791 PSYCH DIAGNOSTIC EVALUATION: CPT | Mod: 95 | Performed by: SOCIAL WORKER

## 2023-08-04 NOTE — PROGRESS NOTES
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 Markedly depressed mood, feelings of hopelessness, or self-deprecating thoughts  4) Marked anxiety, tension, and/or feelings of being keyed up or on edge  C) One (or more) of the following symptoms must additionally be present to reach a total of 5 symptoms when combined with symptoms from criterion B above  1) Decreased interest in usual activities  2) Subjective difficulty in concentration  3) Lethargy, easy fatigability, or marked lack of energy  4) Marked change in appetite; overeating or specific food cravings  5) Hypersomnia or insomnia  6) A sense of being overwhelmed or out of control  7) Physical symptoms such as breast tenderness or swelling; joint or muscle pain, a sensation of  bloating  or weight gain  Severity    D) The symptoms are associated with clinically significant distress or interference with work, school, usual social activities, or relationships with others.    Functional Status:  Patient reports the following functional impairments:  health maintenance, management of the household and or completion of tasks, self-care, and work / vocational responsibilities.     Nonprogrammatic care:  Patient is requesting basic services to address current mental health  concerns.    Clinical Summary:  1. Reason for assessment: PCP and OBGYN recommended therapy.  2. Psychosocial, Cultural and Contextual Factors: Jessica is a 38 year old  female.  She is the mother of 4 year old daughter, and is  and describes relationship with spouse as tense.  Works full time with stable housing.  3. Principal DSM5 Diagnoses  (Sustained by DSM5 Criteria Listed Above):   PMDD  4. Other Diagnoses that is relevant to services:   NA  5. Provisional Diagnosis:  PMDD; rule outs MICHELLE, MDD, Bipolar Disorder, Trauma .  6. Prognosis: Relieve Acute Symptoms and Maintain Current Status / Prevent Deterioration.  7. Likely consequences of symptoms if not treated: symptoms may worsen without support.  8. Client strengths include:  committed to sobriety, creative, employed, goal-focused, has a previous history of therapy, insightful, open to learning, and responsible parent .     Recommendations:     1. Plan for Safety and Risk Management:   Safety and Risk: Recommended that patient call 911 or go to the local ED should there be a change in any of these risk factors..          Report to child / adult protection services was NA.     2. Patient's identified mental health concerns with a cultural influence will be addressed by ongoing psychotherapy.     3. Initial Treatment will focus on:    Depressed Mood - management of symptoms  Anxiety - management of symptoms.     4. Resources/Service Plan:    services are not indicated.   Modifications to assist communication are not indicated.   Additional disability accommodations are not indicated.      5. Collaboration:   Collaboration / coordination of treatment will be initiated with the following  support professionals: primary care physician.      6.  Referrals:   The following referral(s) will be initiated: NA. Next Scheduled Appointment: in 1 week.      A Release of Information has been obtained for the following: NA.     Emergency Contact  was  obtained.      Clinical Substantiation/medical necessity for the above recommendations:  symptoms are clinical in nature and affect several areas of functioning.    7. ALBERTINA:    ALBERTINA:  Discussed the general effects of drugs and alcohol on health and well-being. Provider gave patient printed information about the  effects of chemical use on their health and well being. Recommendations:  continue sobriety .     8. Records:   These were reviewed at time of assessment.   Information in this assessment was obtained from the medical record and  provided by patient who is a good historian.    Patient will have open access to their mental health medical record.    9.   Interactive Complexity: No    Provider Name/ Credentials:  TIFFANIE Avalos, [facility]  8/4/2023

## 2023-08-09 ENCOUNTER — VIRTUAL VISIT (OUTPATIENT)
Dept: PSYCHOLOGY | Facility: CLINIC | Age: 38
End: 2023-08-09
Payer: COMMERCIAL

## 2023-08-09 DIAGNOSIS — F32.81 PMDD (PREMENSTRUAL DYSPHORIC DISORDER): Primary | ICD-10-CM

## 2023-08-09 DIAGNOSIS — F41.1 GENERALIZED ANXIETY DISORDER: ICD-10-CM

## 2023-08-09 PROCEDURE — 90834 PSYTX W PT 45 MINUTES: CPT | Mod: VID | Performed by: MARRIAGE & FAMILY THERAPIST

## 2023-08-09 NOTE — PROGRESS NOTES
[facility]   Mental Health & Addiction Services     Progress Note - Initial Visit    Patient  Name:  Petra Patel Date: 8-9-23         Service Type: Individual     Visit Start Time: 10am  Visit End Time: 1050am    Visit #: 1    Attendees: Client attended alone    Service Modality:  Video    Telemedicine Visit: The patient's condition can be safely assessed and treated via synchronous audio and visual telemedicine encounter.      Reason for Telemedicine Visit: Patient has requested telehealth visit    Originating Site (Patient Location): Patient's home      Distant Location (provider location):  Off-site    Consent:  The patient/guardian has verbally consented to: the potential risks and benefits of telemedicine (video visit) versus in person care; bill my insurance or make self-payment for services provided; and responsibility for payment of non-covered services.     Mode of Communication:  Video Conference via [employer]    As the provider I attest to compliance with applicable laws and regulations related to telemedicine.        DATA:   Interactive Complexity: No   Crisis: No     Presenting Concerns/  Current Stressors:   -Patient is a transfer and DA is in process of completion.   -Would like help managing PMDD   -Has felt more snappy and short with others.   -Has a history of trauma.        ASSESSMENT:  Mental Status Assessment:  Appearance:   Appropriate   Eye Contact:   Good   Psychomotor Behavior: Normal   Attitude:   Cooperative   Orientation:   All  Speech   Rate / Production: Normal/ Responsive   Volume:  Normal   Mood:    Anxious   Affect:    Appropriate   Thought Content:  Clear   Thought Form:  Goal Directed   Insight:    Good       Safety Issues and Plan for Safety and Risk Management:   [place] Suicide Severity Rating Scale (Short Version)      12/16/2022    12:33 AM   [place] Suicide Severity Rating (Short Version)   Over the past 2 weeks have you felt down, depressed,  or hopeless? yes   Over the past 2 weeks have you had thoughts of killing yourself? no   Have you ever attempted to kill yourself? no     Patient denies current fears or concerns for personal safety.  Patient denies current or recent suicidal ideation or behaviors.  Patient denies current or recent homicidal ideation or behaviors.  Patient denies current or recent self injurious behavior or ideation.  Patient denies other safety concerns.  Recommended that patient call 911 or go to the local ED should there be a change in any of these risk factors.  Patient reports there are no firearms in the house.     Diagnostic Criteria:  Generalized Anxiety Disorder  A. Excessive anxiety and worry about a number of events or activities (such as work or school performance).   B. The person finds it difficult to control the worry.  C. Select 3 or more symptoms (required for diagnosis). Only one item is required in children.   - Restlessness or feeling keyed up or on edge.    - Being easily fatigued.    - Difficulty concentrating or mind going blank.    - Irritability.    - Muscle tension.    - Sleep disturbance (difficulty falling or staying asleep, or restless unsatisfying sleep).   D. The focus of the anxiety and worry is not confined to features of an Axis I disorder.  E. The anxiety, worry, or physical symptoms cause clinically significant distress or impairment in social, occupational, or other important areas of functioning.   F. The disturbance is not due to the direct physiological effects of a substance (e.g., a drug of abuse, a medication) or a general medical condition (e.g., hyperthyroidism) and does not occur exclusively during a Mood Disorder, a Psychotic Disorder, or a Pervasive Developmental Disorder.      DSM5 Diagnoses: (Sustained by DSM5 Criteria Listed Above)  Diagnoses: 300.02 (F41.1) Generalized Anxiety Disorder  PMDD F32.81  Psychosocial & Contextual Factors: History of multiple traumas        Intervention:   Educated on treatment planning and started identifying goals and interventions for treatment plan   Reviewing history and building a theraputic relationship     Collateral Reports Completed:  Not Applicable      PLAN: (Homework, other):  1. Provider will continue Diagnostic Assessment.  Patient was given the following to do until next session:  Think about goals- Less reactive (not angry as quickly), less anxiety in general.  Being more thoughtful about circumstances.      2. Provider recommended the following referrals: None at this time .      3.  Suicide Risk and Safety Concerns were assessed for Petra Patel.    Patient meets the following risk assessment and triage: Patient meets the following risk assessment and triage: Patient has no current safety concerns, committed to keeping self safe.       Judie Kang, LMFT  August 9, 2023

## 2023-08-10 ENCOUNTER — VIRTUAL VISIT (OUTPATIENT)
Dept: PSYCHIATRY | Facility: CLINIC | Age: 38
End: 2023-08-10
Payer: COMMERCIAL

## 2023-08-10 DIAGNOSIS — F32.81 PMDD (PREMENSTRUAL DYSPHORIC DISORDER): Primary | ICD-10-CM

## 2023-08-10 DIAGNOSIS — G47.00 INSOMNIA, UNSPECIFIED TYPE: ICD-10-CM

## 2023-08-10 DIAGNOSIS — F41.1 GENERALIZED ANXIETY DISORDER: ICD-10-CM

## 2023-08-10 PROCEDURE — 99214 OFFICE O/P EST MOD 30 MIN: CPT | Mod: VID | Performed by: PSYCHIATRY & NEUROLOGY

## 2023-08-10 RX ORDER — LORAZEPAM 0.5 MG/1
0.5 TABLET ORAL
Qty: 30 TABLET | Refills: 3 | Status: SHIPPED | OUTPATIENT
Start: 2023-08-10 | End: 2023-11-09

## 2023-08-10 RX ORDER — BUSPIRONE HYDROCHLORIDE 5 MG/1
2.5 TABLET ORAL 2 TIMES DAILY
Qty: 90 TABLET | Refills: 0 | Status: SHIPPED | OUTPATIENT
Start: 2023-08-10 | End: 2023-10-05

## 2023-08-10 ASSESSMENT — PAIN SCALES - GENERAL: PAINLEVEL: NO PAIN (0)

## 2023-08-10 NOTE — PROGRESS NOTES
Virtual Visit Details    Type of service:  Video Visit     Originating Location (pt. Location): Home    Distant Location (provider location):  Off-site  Platform used for Video Visit: [facility] Psychiatry Consult Note    IDENTIFICATION   Name: Petra Patel   : 1985/38 year old      Sex:    @ female          Telemedicine Visit: The patient's condition can be safely assessed and treated via synchronous audio and visual telemedicine encounter.        Face to Face/patient Contact total time: 18 minutes  Pre Charting time: 0 minutes; Post charting time, communication and other activities: 8 minutes; Total time 26 minutes  9:06 AM-9:24 AM          SUBJECTIVE   Doing fine overall. Pre menses episode was fine and then suddenly worsened. Kicked her  out of the house out of anger. Lasted 3 days. Suddenly things got better. Buspirone is helping - it is slightly less intense. Headache resolved. Buspirone has alterred cycle and no longer predictable, can be two weeks early or 1 week late.         PHQ-9 scores:      2023     9:50 AM 2023     8:49 AM 2023    12:31 PM   PHQ-9 SCORE   PHQ-9 Total Score MyChart 17 (Moderately severe depression) 10 (Moderate depression) 10 (Moderate depression)   PHQ-9 Total Score 17 10 10       MICHELLE-7 scores:      2017     2:13 PM 2023    11:32 AM   MICHELLE-7 SCORE   Total Score  19 (severe anxiety)   Total Score 14 19       OBJECTIVE     Vital Signs:   There were no vitals taken for this visit.    Labs:  na    Current Medications:  Current Outpatient Medications   Medication    Ascorbic Acid (VITAMIN C) 500 MG PACK    busPIRone (BUSPAR) 5 MG tablet    multivitamin, therapeutic (THERA-VIT) TABS tablet    vitamin B complex with vitamin C (STRESS TAB) tablet    Vitamin D, Cholecalciferol, 25 MCG (1000 UT) CAPS    zinc gluconate 50 MG tablet     No current facility-administered medications for this visit.        The [place] Prescription  Monitoring Program has been reviewed and there are no concerns about diversionary activity for controlled substances at this time.        ADDED HISTORY   Does not recall if fluoxetine helped with PMDD.  Has experienced serotonin syndrome with skin feeling on fire, brain feeling on fire, significantly reduced pupil size.      MENTAL STATUS EXAMINATION:   Appearance: Good attention to grooming and hygiene, casual garb  Attitude: Cooperative  Eye Contact: Good  Gait and Station: Lying down  Psychomotor Behavior: Within normal limits  Oriented to: Grossly person place and time  Attention Span and Concentration: Grossly intact  Speech: Normal tone  Language: English  Mood:  Fair  Affect: Mildly constricted  Associations:  no loose associations  Thought Process:  logical, linear and goal oriented  Thought Content: No evidence of delusions or suicidal or homicidal ideation plan or intent  Memory: Good  Fund of Knowledge: Good  Insight:  good  Judgment:  intact, adequate for safety  Impulse Control:  intact        DIAGNOSES:   Premenstrual dysphoric disorder  Unspecified anxiety disorder  Insomnia disorder      ASSESSMENT:   Patient with mood dysregulation, insomnia, anxiety and even at times has had suicidal ideation specifically in context of period of time 2 weeks preceding menses.  Buspirone with partial efficacy for PMDD, add fluoxetine-SSRIs are indicated.  Given serotonin syndrome concern, utilize compounding pharmacy for minimal dosage titration.  Currently insomnia stabilized with lorazepam 0.5 mg nightly, though in general outside of PMDD episodes using 0.25 mg.  Benefits outweigh risks of long-term benzodiazepine management.    Will be doing CBT and insomnia in hopes of avoiding lorazepam outside of PMDD episodes.  Has not tolerated other sedative trials and father had incident of harder while on a sedative.    Today Petra Patel reports no suicidal ideations. In addition, she has notable risk factors for  self-harm, including anxiety, prior suicide attempts. However, risk is mitigated by commitment to family and history of seeking help when needed. Therefore, based on all available evidence including the factors cited above, she does not appear to be at imminent risk for self-harm, does not meet criteria for a 72-hr hold, and therefore remains appropriate for ongoing outpatient level of care.       PLAN:     Patient advised of consultative model. Patient will continue to be seen for ongoing consultation and stabilization.  Does not meet criteria for involuntary treatment or hospitalization  Add fluoxetine 8/10/2023 1 mg daily compounded in liquid-Risks, benefits and alternatives discussed.  Patient provides verbal consent to treatment.  Provider called compounding pharmacy for guidance on prescription.  Continue buspirone 2.5 mg p.o. twice daily partial efficacy-provides verbal consent to treatment   Lorazepam 0.25 mg nightly/0.5 mg nightly for PMDD episode-Risks, benefits and alternatives discussed.  Patient provides verbal consent to treatment.  Was advised of little bit of memory loss with long-term use.  Benefits significantly outweigh risks given suicidality, mood dysregulation and other concerns in context of bad side effects with other medications.  Labs -reviewed, no further labs indicated at this time  Consultation continues with MAIDA Hernandez, LisyD  Cognitive behavioral therapy for insomnia December 2023  Therapy with TASH Kang for PMDD  Return in 46 weeks    Administrative Billing:   Time spent with patient was greater than 50% of time and/or significant time was spent in counseling and coordination of care regarding above diagnoses and treatment plan. Pre charting time and post charting time/documentation/coordination are done on date of service.      Signed:   Saad Garza M.D.  [facility] Psychiatry Service    Disclaimer: This note consists of symbols derived from keyboarding, dictation and/or  voice recognition software. As a result, there may be errors in the script that have gone undetected. Please consider this when interpreting information found in this chart.

## 2023-08-10 NOTE — NURSING NOTE
Is the patient currently in the state of [place]? YES    Visit mode:VIDEO    If the visit is dropped, the patient can be reconnected by: VIDEO VISIT: Text to cell phone: [phone]    Will anyone else be joining the visit? NO      How would you like to obtain your AVS? MyChart    Are changes needed to the allergy or medication list? NO    Reason for visit: RECHECK

## 2023-08-11 ENCOUNTER — MYC MEDICAL ADVICE (OUTPATIENT)
Dept: PSYCHIATRY | Facility: CLINIC | Age: 38
End: 2023-08-11
Payer: COMMERCIAL

## 2023-08-11 DIAGNOSIS — F32.81 PMDD (PREMENSTRUAL DYSPHORIC DISORDER): Primary | ICD-10-CM

## 2023-08-11 DIAGNOSIS — F41.1 GENERALIZED ANXIETY DISORDER: ICD-10-CM

## 2023-08-11 NOTE — TELEPHONE ENCOUNTER
RN reviewed patient's Syntaxint message. Patient reports that the pharmacy told her that there is commercial Prozac available that comes in liquid form so they will not fill the compounded prescription. The patient would like the prescription transferred to [facility] in [place] off [address]. Routing to provider as RN cannot start a new order.     BULMARO DOLL RN on 8/11/2023 at 3:01 PM

## 2023-08-14 RX ORDER — FLUOXETINE 20 MG/5ML
1 SOLUTION ORAL DAILY
Qty: 8 ML | Refills: 1 | Status: SHIPPED | OUTPATIENT
Start: 2023-08-14 | End: 2023-08-16 | Stop reason: SINTOL

## 2023-08-16 NOTE — TELEPHONE ENCOUNTER
See My chart message about the prozac. She did not tolerate. She would like to back on the lexapro compounded to 0.5 mg again. The 1 mg made her sleepy.     Next appt 9/15/23.     Janie Dunlap RN on 8/16/2023 at 1:53 PM

## 2023-08-17 ENCOUNTER — TELEPHONE (OUTPATIENT)
Dept: PSYCHIATRY | Facility: CLINIC | Age: 38
End: 2023-08-17
Payer: COMMERCIAL

## 2023-08-17 NOTE — TELEPHONE ENCOUNTER
Reason for Call:  Other prescription    Detailed comments: Received call from Janessa requesting clarity on the submitted Medication Escitalopram; regarding quantity     Phone Number can be reached at: [phone]      Best Time: Anytime     Can we leave a detailed message on this number? YES    Call taken on 8/17/2023 at 8:45 AM by Shan Cortez

## 2023-08-19 ENCOUNTER — NURSE TRIAGE (OUTPATIENT)
Dept: NURSING | Facility: CLINIC | Age: 38
End: 2023-08-19
Payer: COMMERCIAL

## 2023-08-20 NOTE — TELEPHONE ENCOUNTER
Nurse Triage SBAR    Is this a 2nd Level Triage? YES, LICENSED PRACTITIONER REVIEW IS REQUIRED    Dr. Elizabeth Shanks  Provider Recommendation Follow Up:   Reached patient/caregiver. Informed of provider's recommendations. Patient verbalized understanding and agrees with the plan.             Situation: Anxiety    Background: Patient took lexapro 0.25mg today concerned she is having a reaction to the medication    Assessment: Anxiety    Protocol Recommended Disposition:   Go To ED/UCC Now (Or To Office With PCP Approval)    Recommendation: Stop taking the drug,      Paged to provider    Does the patient meet one of the following criteria for ADS visit consideration? 16+ years old, with an [facility] PCP     TIP  Providers, please consider if this condition is appropriate for management at one of our Acute and Diagnostic Services sites.     If patient is a good candidate, please use dotphrase <dot>triageresponse and select Refer to ADS to document.        Reason for Disposition   Nursing judgment    Additional Information   Negative: [1] Intentional drug overdose AND [2] suicidal thoughts or ideas   Negative: MORE THAN A DOUBLE DOSE of a prescription or over-the-counter (OTC) drug   Negative: [1] DOUBLE DOSE (an extra dose or lesser amount) of prescription drug AND [2] any symptoms (e.g., dizziness, nausea, pain, sleepiness)   Negative: [1] DOUBLE DOSE (an extra dose or lesser amount) of over-the-counter (OTC) drug AND [2] any symptoms (e.g., dizziness, nausea, pain, sleepiness)   Negative: [1] DOUBLE DOSE (an extra dose or lesser amount) of prescription drug AND [2] NO symptoms (Exception: a double dose of antibiotics)   Negative: Diabetes drug error or overdose (e.g., took wrong type of insulin or took extra dose)   Negative: [1] Prescription not at pharmacy AND [2] was prescribed by PCP recently (Exception: triager has access to EMR and prescription is recorded there. Go to Home Care and confirm for pharmacy.)    Negative: [1] Pharmacy calling with prescription question AND [2] triager unable to answer question    Protocols used: Medication Question Call-A-AH, No Protocol [id number]

## 2023-09-15 ENCOUNTER — TELEPHONE (OUTPATIENT)
Dept: NURSING | Facility: CLINIC | Age: 38
End: 2023-09-15
Payer: COMMERCIAL

## 2023-09-15 NOTE — TELEPHONE ENCOUNTER
The patient is complaining of increase in anxiety  She reports she is traveling right now and is [place]  She reports her lexapro and buspar is not alleviating her symptoms.  The patient is not currently in the state, so I am not able to triage her  Triager advised the patient to see an urgent care while in [place] for evaluation.

## 2023-09-19 ENCOUNTER — VIRTUAL VISIT (OUTPATIENT)
Dept: PSYCHOLOGY | Facility: CLINIC | Age: 38
End: 2023-09-19
Payer: COMMERCIAL

## 2023-09-19 DIAGNOSIS — F32.81 PMDD (PREMENSTRUAL DYSPHORIC DISORDER): Primary | ICD-10-CM

## 2023-09-19 DIAGNOSIS — F41.1 GENERALIZED ANXIETY DISORDER: ICD-10-CM

## 2023-09-19 PROCEDURE — 90834 PSYTX W PT 45 MINUTES: CPT | Mod: VID | Performed by: MARRIAGE & FAMILY THERAPIST

## 2023-09-19 ASSESSMENT — ANXIETY QUESTIONNAIRES
7. FEELING AFRAID AS IF SOMETHING AWFUL MIGHT HAPPEN: SEVERAL DAYS
3. WORRYING TOO MUCH ABOUT DIFFERENT THINGS: SEVERAL DAYS
6. BECOMING EASILY ANNOYED OR IRRITABLE: SEVERAL DAYS
IF YOU CHECKED OFF ANY PROBLEMS ON THIS QUESTIONNAIRE, HOW DIFFICULT HAVE THESE PROBLEMS MADE IT FOR YOU TO DO YOUR WORK, TAKE CARE OF THINGS AT HOME, OR GET ALONG WITH OTHER PEOPLE: VERY DIFFICULT
2. NOT BEING ABLE TO STOP OR CONTROL WORRYING: SEVERAL DAYS
4. TROUBLE RELAXING: MORE THAN HALF THE DAYS
1. FEELING NERVOUS, ANXIOUS, OR ON EDGE: NEARLY EVERY DAY
GAD7 TOTAL SCORE: 9
GAD7 TOTAL SCORE: 9
5. BEING SO RESTLESS THAT IT IS HARD TO SIT STILL: NOT AT ALL

## 2023-09-19 ASSESSMENT — PATIENT HEALTH QUESTIONNAIRE - PHQ9
SUM OF ALL RESPONSES TO PHQ QUESTIONS 1-9: 7
10. IF YOU CHECKED OFF ANY PROBLEMS, HOW DIFFICULT HAVE THESE PROBLEMS MADE IT FOR YOU TO DO YOUR WORK, TAKE CARE OF THINGS AT HOME, OR GET ALONG WITH OTHER PEOPLE: NOT DIFFICULT AT ALL
SUM OF ALL RESPONSES TO PHQ QUESTIONS 1-9: 7

## 2023-09-19 NOTE — PROGRESS NOTES
[facility]                                     Progress Note    Patient Name: Petra Patel  Date: 9-19-23         Service Type: Individual      Session Start Time: 2pm  Session End Time: 250pm     Session Length: 50min    Session #: 2    Attendees: Client attended alone    Service Modality:  Video    Telemedicine Visit: The patient's condition can be safely assessed and treated via synchronous audio and visual telemedicine encounter.      Reason for Telemedicine Visit: Patient has requested telehealth visit    Originating Site (Patient Location): Patient's home      Distant Location (provider location):  Off-site    Consent:  The patient/guardian has verbally consented to: the potential risks and benefits of telemedicine (video visit) versus in person care; bill my insurance or make self-payment for services provided; and responsibility for payment of non-covered services.     Mode of Communication:  Video Conference via [employer]    As the provider I attest to compliance with applicable laws and regulations related to telemedicine.     DATA  Interactive Complexity: No  Crisis: No        Progress Since Last Session (Related to Symptoms / Goals / Homework):   Symptoms: Improving Feeling there is some improvement with anxiety since a medication adjustment     Homework: Achieved / completed to satisfaction  Completed in session      Episode of Care Goals: Satisfactory progress - ACTION (Actively working towards change); Intervened by reinforcing change plan / affirming steps taken     Current / Ongoing Stressors and Concerns:   -History of PMDD   -Working on managing anxiety and racing thoughts.   -Felt she has not had as much paranoia.   -Working on reactions at home with success   -Not catastrophizing as much and feeling she is taking a more problem solving approach to issues.      Treatment Objective(s) Addressed in This Session:   Patient will identify 5 ways to improve quality of  life when she is struggling with PMDD  Patient will work on building coping skills around anxiety.         Intervention:   Continue with medication management and use daily medication along with as needed anti anxiety medications.   Work on catching and identifying cognitive distortions.      Assessments completed prior to visit:  The following assessments were completed by patient for this visit:  PHQ2:       2/23/2023     9:50 AM 4/6/2017     1:20 PM 3/21/2016     2:14 PM 6/22/2012     3:03 PM   PHQ-2 ( 1999 [employer])   Q1: Little interest or pleasure in doing things 2 0 0 2   Q2: Feeling down, depressed or hopeless 2 0 0 3   PHQ-2 Score 4 0 0 5   Q1: Little interest or pleasure in doing things More than half the days    More than half the days      Q2: Feeling down, depressed or hopeless More than half the days    More than half the days      PHQ-2 Score 4    4        PHQ9:       4/15/2011    11:00 AM 6/22/2012     2:30 PM 3/21/2016     2:14 PM 2/23/2023     9:50 AM 6/26/2023     8:49 AM 7/27/2023    12:31 PM 9/19/2023     1:54 PM   PHQ-9 SCORE   PHQ-9 Total Score 8 19        PHQ-9 Total Score MyChart    17 (Moderately severe depression) 10 (Moderate depression) 10 (Moderate depression) 7 (Mild depression)   PHQ-9 Total Score   2 17 10 10 7     GAD2:       6/19/2023     5:44 PM 7/24/2023    11:32 AM 8/10/2023     8:48 AM 9/19/2023     1:55 PM   MICHELLE-2   Feeling nervous, anxious, or on edge 1 3 1 3   Not being able to stop or control worrying 0 3 1 1   MICHELLE-2 Total Score 1 6 2 4     GAD7:       4/13/2017     2:13 PM 7/24/2023    11:32 AM 9/19/2023     1:55 PM   MICHELLE-7 SCORE   Total Score  19 (severe anxiety) 9 (mild anxiety)   Total Score 14 19 9     PROMIS 10-Global Health (all questions and answers displayed):       6/19/2023     5:53 PM 7/24/2023    11:33 AM   PROMIS 10   In general, would you say your health is: Very good Very good   In general, would you say your quality of life is: Good Good   In general, how  would you rate your physical health? Very good Good   In general, how would you rate your mental health, including your mood and your ability to think? Good Poor   In general, how would you rate your satisfaction with your social activities and relationships? Fair Fair   In general, please rate how well you carry out your usual social activities and roles Fair Fair   To what extent are you able to carry out your everyday physical activities such as walking, climbing stairs, carrying groceries, or moving a chair? Completely Completely   In the past 7 days, how often have you been bothered by emotional problems such as feeling anxious, depressed, or irritable? Sometimes Always   In the past 7 days, how would you rate your fatigue on average? Moderate Mild   In the past 7 days, how would you rate your pain on average, where 0 means no pain, and 10 means worst imaginable pain? 0 0   In general, would you say your health is: 4 4   In general, would you say your quality of life is: 3 3   In general, how would you rate your physical health? 4 3   In general, how would you rate your mental health, including your mood and your ability to think? 3 1   In general, how would you rate your satisfaction with your social activities and relationships? 2 2   In general, please rate how well you carry out your usual social activities and roles. (This includes activities at home, at work and in your community, and responsibilities as a parent, child, spouse, employee, friend, etc.) 2 2   To what extent are you able to carry out your everyday physical activities such as walking, climbing stairs, carrying groceries, or moving a chair? 5 5   In the past 7 days, how often have you been bothered by emotional problems such as feeling anxious, depressed, or irritable? 3 5   In the past 7 days, how would you rate your fatigue on average? 3 2   In the past 7 days, how would you rate your pain on average, where 0 means no pain, and 10 means  worst imaginable pain? 0 0   Global Mental Health Score 11 7   Global Physical Health Score 17 17   PROMIS TOTAL - SUBSCORES 28 24     PROMIS 10-Global Health (only subscores and total score):       6/19/2023     5:53 PM 7/24/2023    11:33 AM   PROMIS-10 Scores Only   Global Mental Health Score 11 7   Global Physical Health Score 17 17   PROMIS TOTAL - SUBSCORES 28 24     [place] Suicide Severity Rating Scale (Lifetime/Recent)       No data to display                  ASSESSMENT: Current Emotional / Mental Status (status of significant symptoms):   Risk status (Self / Other harm or suicidal ideation)   Patient denies current fears or concerns for personal safety.   Patient denies current or recent suicidal ideation or behaviors.   Patient denies current or recent homicidal ideation or behaviors.   Patient denies current or recent self injurious behavior or ideation.   Patient denies other safety concerns.   Patient reports there has been no change in risk factors since their last session.     Patient reports there has been no change in protective factors since their last session.     Recommended that patient call 911 or go to the local ED should there be a change in any of these risk factors.     Appearance:   Appropriate    Eye Contact:   Good    Psychomotor Behavior: Normal    Attitude:   Cooperative    Orientation:   All   Speech    Rate / Production: Normal     Volume:  Normal    Mood:    Anxious    Affect:    Appropriate    Thought Content:  Clear    Thought Form:  Coherent  Logical    Insight:    Good      Medication Review:   Changes to psychiatric medications, see updated Medication List in EPIC.      Medication Compliance:   Yes     Changes in Health Issues:   None reported     Chemical Use Review:   Substance Use: Chemical use reviewed, no active concerns identified      Tobacco Use: No current tobacco use.      Diagnosis:  300.02 (F41.1) Generalized Anxiety Disorder  PMDD F32.81    Collateral Reports  Completed:   Not Applicable    PLAN: (Patient Tasks / Therapist Tasks / Other)  -Work on identifying and catching cognitive distortions.    -Continue with medication management and use as needed anti anxiety medications when experiencing panic.         Judie Kang, LMFT                                                         ______________________________________________________________________    Individual Treatment Plan    Patient's Name: Petra Patel  YOB: 1985    Date of Creation: 9-19-23  Date Treatment Plan Last Reviewed/Revised: 9-19-23    Diagnoses:  300.02 (F41.1) Generalized Anxiety Disorder  PMDD F32.81  Psychosocial & Contextual Factors: History of multiple traumas   PROMIS (reviewed every 90 days): 24    Referral / Collaboration:  Referral to another professional/service is not indicated at this time..    Anticipated number of session for this episode of care: 6-9 sessions  Anticipation frequency of session: Biweekly  Anticipated Duration of each session: 38-52 minutes  Treatment plan will be reviewed in 90 days or when goals have been changed.       MeasurableTreatment Goal(s) related to diagnosis / functional impairment(s)  Goal 1: Patient will address struggles with PMDD and anxiety     I will know I've met my goal when I am not having racing thoughts .      Objective #A (Patient Action)    Patient will use thought-stopping strategy daily to reduce intrusive thoughts.  Status: New - Date: 9-19-23      Intervention(s)  Therapist will use CBT and solution focused therapy.     Objective #B  Patient will work on identifying 5 ways to manage PMDD symptoms.    Status: New - Date: 9-19-23      Intervention(s)  Therapist will use CBT and solution focused therapy.     Objective #C  Patient will use at least 10 coping skills for anxiety management in the next 24 weeks.  Status: New - Date: 9-19-23      Intervention(s)  Therapist will use CBT and solution focused therapy            Patient has reviewed and agreed to the above plan.      Judie Kang, LMFT  September 19, 2023

## 2023-10-05 ENCOUNTER — VIRTUAL VISIT (OUTPATIENT)
Dept: PSYCHIATRY | Facility: CLINIC | Age: 38
End: 2023-10-05
Payer: COMMERCIAL

## 2023-10-05 DIAGNOSIS — F32.81 PMDD (PREMENSTRUAL DYSPHORIC DISORDER): Primary | ICD-10-CM

## 2023-10-05 DIAGNOSIS — F41.1 GENERALIZED ANXIETY DISORDER: ICD-10-CM

## 2023-10-05 PROCEDURE — 99214 OFFICE O/P EST MOD 30 MIN: CPT | Mod: VID | Performed by: PSYCHIATRY & NEUROLOGY

## 2023-10-05 RX ORDER — MIRTAZAPINE 7.5 MG/1
7.5 TABLET, FILM COATED ORAL
Qty: 30 TABLET | Refills: 1 | Status: SHIPPED | OUTPATIENT
Start: 2023-10-05 | End: 2023-11-09 | Stop reason: SINTOL

## 2023-10-05 RX ORDER — BUSPIRONE HYDROCHLORIDE 5 MG/1
2.5 TABLET ORAL 2 TIMES DAILY
Qty: 90 TABLET | Refills: 0 | Status: SHIPPED | OUTPATIENT
Start: 2023-10-05 | End: 2023-11-09

## 2023-10-05 NOTE — NURSING NOTE
Is the patient currently in the state of [place]? YES    Visit mode:VIDEO    If the visit is dropped, the patient can be reconnected by: VIDEO VISIT: Text to cell phone:   Telephone Information:   Mobile [phone]       Will anyone else be joining the visit? NO  (If patient encounters technical issues they should call [phone])    How would you like to obtain your AVS? MyChart    Are changes needed to the allergy or medication list? Pt stated no changes to allergies and Pt stated no med changes    Reason for visit: ELFEGO DIAZ

## 2023-10-05 NOTE — PROGRESS NOTES
Virtual Visit Details    Type of service:  Video Visit     Originating Location (pt. Location): Other work, secure    Distant Location (provider location):  Off-site  Platform used for Video Visit: [facility] Consult Note    IDENTIFICATION   Name: Petra Patel   : 1985/38 year old      Sex:    @ female          Telemedicine Visit: The patient's condition can be safely assessed and treated via synchronous audio and visual telemedicine encounter.        Face to Face/patient Contact total time: 30 minutes  Pre Charting time: 2 minutes; Post charting time, communication and other activities: 0 minutes; Total time 32 minutes  3:39 PM - 4:09PM          SUBJECTIVE   Escitalopram having efficacy. Less getting worked up or stuck on things. No longer having hopeless feeling. Feels like she has control of her life.     Out of nowhere having a hard time juggling things; lost anxiety and thus feeling less productive. 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 No longer constantly checking watch and remembering x y z. It has been improving; at first with escitalopram was really bad. Time blindness has not gotten better. General focus and productivity doing a lot better.     More productive actually at work because less anxiety about performance.         PHQ-9 scores:      2023     8:49 AM 2023    12:31 PM 2023     1:54 PM   PHQ-9 SCORE   PHQ-9 Total Score MyChart 10 (Moderate depression) 10 (Moderate depression) 7 (Mild depression)   PHQ-9 Total Score 10 10 7       MICHELLE-7 scores:      2017     2:13 PM 2023    11:32 AM 2023     1:55 PM   MICHELLE-7 SCORE   Total Score  19 (severe anxiety) 9 (mild anxiety)   Total Score 14 19 9       OBJECTIVE     Vital Signs:   There were no vitals taken for this visit.    Labs:  na    Current Medications:  Current Outpatient Medications   Medication    Ascorbic Acid (VITAMIN C) 500 MG PACK    busPIRone (BUSPAR) 5 MG tablet    COMPOUNDED  NON-CONTROLLED SUBSTANCE (CMPD RX) - PHARMACY TO MIX COMPOUNDED MEDICATION    LORazepam (ATIVAN) 0.5 MG tablet    multivitamin, therapeutic (THERA-VIT) TABS tablet    vitamin B complex with vitamin C (STRESS TAB) tablet    Vitamin D, Cholecalciferol, 25 MCG (1000 UT) CAPS    zinc gluconate 50 MG tablet     No current facility-administered medications for this visit.        The [place] Prescription Monitoring Program has been reviewed and there are no concerns about diversionary activity for controlled substances at this time.        ADDED HISTORY   Has decided not to move forward with CBT-I with expected limited benefits. Difficulty with lorazepam is rebound insomnia then has to take consistently.       MENTAL STATUS EXAMINATION:   Appearance: Good attention to grooming and hygiene, casual garb  Attitude: Cooperative  Eye Contact: Good  Gait and Station: Lying down  Psychomotor Behavior: Within normal limits  Oriented to: Grossly person place and time  Attention Span and Concentration: Grossly intact  Speech: Normal tone  Language: English  Mood:  Fair  Affect: Mildly constricted  Associations:  no loose associations  Thought Process:  logical, linear and goal oriented  Thought Content: No evidence of delusions or suicidal or homicidal ideation plan or intent  Memory: Good  Fund of Knowledge: Good  Insight:  good  Judgment:  intact, adequate for safety  Impulse Control:  intact        DIAGNOSES:   Premenstrual dysphoric disorder  Unspecified anxiety disorder  Insomnia disorder      ASSESSMENT:   Patient with mood dysregulation, insomnia, anxiety and even at times has had suicidal ideation specifically in context of period of time 2 weeks preceding menses.  Buspirone with partial efficacy for PMDD, add fluoxetine-SSRIs are indicated.  Given serotonin syndrome concern, utilize compounding pharmacy for minimal dosage titration.  Currently insomnia stabilized with lorazepam 0.5 mg nightly, though in general outside of  PMDD episodes using 0.25 mg.  Benefits outweigh risks of long-term benzodiazepine management.    Will be doing CBT and insomnia in hopes of avoiding lorazepam outside of PMDD episodes.  Has not tolerated other sedative trials and father had incident of harder while on a sedative.    Today Petra Patel reports no suicidal ideations. In addition, she has notable risk factors for self-harm, including anxiety, prior suicide attempts. However, risk is mitigated by commitment to family and history of seeking help when needed. Therefore, based on all available evidence including the factors cited above, she does not appear to be at imminent risk for self-harm, does not meet criteria for a 72-hr hold, and therefore remains appropriate for ongoing outpatient level of care.       PLAN:     Patient advised of consultative model. Patient will continue to be seen for ongoing consultation and stabilization.  Does not meet criteria for involuntary treatment or hospitalization  Add escitalopram 8/16/2023 0.5 mg daily compounded in liquid very effective for mood/anxiety, maybe too effective and too little anxiety, consider 0.4-Risks, benefits and alternatives discussed.  Patient provides verbal consent to treatment.  Provider called compounding pharmacy for guidance on prescription.  Continue buspirone 2.5 mg p.o. twice daily partial efficacy-provides verbal consent to treatment   Trial mirtazapine 10/5/23 7.5 mg po at bedtime prn insomnia -Risks, benefits and alternatives discussed.  Patient provides verbal consent to treatment.to try and replace lorazepam  Backup - suvorexant  Lorazepam 0.25 mg nightly/0.5 mg nightly for PMDD episode => 10/5/23 0.25 mg po qhs-Risks, benefits and alternatives discussed.  Patient provides verbal consent to treatment.  Was advised of little bit of memory loss with long-term use.  Benefits significantly outweigh risks given suicidality, mood dysregulation and other concerns in context of bad side  effects with other medications. Has to take lorazepam nightly due to rebound insomnia when med is needed during PMDD.  Dc'd fluoxetine (panic attacks, insomnia, wired)  Labs -reviewed, no further labs indicated at this time  Consultation continues with MAIDA Hernandez PharmD  Therapy with TASH Kang for PMDD  Return in 4-6 weeks    Administrative Billing:   Time spent with patient was greater than 50% of time and/or significant time was spent in counseling and coordination of care regarding above diagnoses and treatment plan. Pre charting time and post charting time/documentation/coordination are done on date of service.      Signed:   Saad Garza M.D.  [facility]    Disclaimer: This note consists of symbols derived from keyboarding, dictation and/or voice recognition software. As a result, there may be errors in the script that have gone undetected. Please consider this when interpreting information found in this chart.

## 2023-10-13 ENCOUNTER — VIRTUAL VISIT (OUTPATIENT)
Dept: PSYCHOLOGY | Facility: CLINIC | Age: 38
End: 2023-10-13
Payer: COMMERCIAL

## 2023-10-13 DIAGNOSIS — F32.81 PMDD (PREMENSTRUAL DYSPHORIC DISORDER): Primary | ICD-10-CM

## 2023-10-13 DIAGNOSIS — F41.1 GENERALIZED ANXIETY DISORDER: ICD-10-CM

## 2023-10-13 PROCEDURE — 90834 PSYTX W PT 45 MINUTES: CPT | Mod: VID | Performed by: MARRIAGE & FAMILY THERAPIST

## 2023-10-13 ASSESSMENT — PATIENT HEALTH QUESTIONNAIRE - PHQ9
SUM OF ALL RESPONSES TO PHQ QUESTIONS 1-9: 3
SUM OF ALL RESPONSES TO PHQ QUESTIONS 1-9: 3
10. IF YOU CHECKED OFF ANY PROBLEMS, HOW DIFFICULT HAVE THESE PROBLEMS MADE IT FOR YOU TO DO YOUR WORK, TAKE CARE OF THINGS AT HOME, OR GET ALONG WITH OTHER PEOPLE: NOT DIFFICULT AT ALL
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
## 2023-10-27 ENCOUNTER — VIRTUAL VISIT (OUTPATIENT)
Dept: PSYCHOLOGY | Facility: CLINIC | Age: 38
End: 2023-10-27
Payer: COMMERCIAL

## 2023-10-27 DIAGNOSIS — F41.1 GENERALIZED ANXIETY DISORDER: ICD-10-CM

## 2023-10-27 DIAGNOSIS — F32.81 PMDD (PREMENSTRUAL DYSPHORIC DISORDER): Primary | ICD-10-CM

## 2023-10-27 PROCEDURE — 90834 PSYTX W PT 45 MINUTES: CPT | Mod: VID | Performed by: MARRIAGE & FAMILY THERAPIST

## 2023-10-27 ASSESSMENT — PATIENT HEALTH QUESTIONNAIRE - PHQ9
SUM OF ALL RESPONSES TO PHQ QUESTIONS 1-9: 10
10. IF YOU CHECKED OFF ANY PROBLEMS, HOW DIFFICULT HAVE THESE PROBLEMS MADE IT FOR YOU TO DO YOUR WORK, TAKE CARE OF THINGS AT HOME, OR GET ALONG WITH OTHER PEOPLE: SOMEWHAT DIFFICULT
SUM OF ALL RESPONSES TO PHQ QUESTIONS 1-9: 10
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
## 2023-11-09 ENCOUNTER — VIRTUAL VISIT (OUTPATIENT)
Dept: PSYCHIATRY | Facility: CLINIC | Age: 38
End: 2023-11-09
Payer: COMMERCIAL

## 2023-11-09 DIAGNOSIS — G47.00 INSOMNIA, UNSPECIFIED TYPE: Primary | ICD-10-CM

## 2023-11-09 DIAGNOSIS — F41.1 GENERALIZED ANXIETY DISORDER: ICD-10-CM

## 2023-11-09 PROCEDURE — 99214 OFFICE O/P EST MOD 30 MIN: CPT | Mod: VID | Performed by: PSYCHIATRY & NEUROLOGY

## 2023-11-09 RX ORDER — LORAZEPAM 0.5 MG/1
0.5 TABLET ORAL
Qty: 30 TABLET | Refills: 3 | Status: SHIPPED | OUTPATIENT
Start: 2023-11-09 | End: 2023-12-21

## 2023-11-09 RX ORDER — BUSPIRONE HYDROCHLORIDE 5 MG/1
2.5 TABLET ORAL 2 TIMES DAILY
Qty: 90 TABLET | Refills: 0 | Status: SHIPPED | OUTPATIENT
Start: 2023-11-09 | End: 2023-12-21

## 2023-11-09 ASSESSMENT — ANXIETY QUESTIONNAIRES
5. BEING SO RESTLESS THAT IT IS HARD TO SIT STILL: NOT AT ALL
2. NOT BEING ABLE TO STOP OR CONTROL WORRYING: SEVERAL DAYS
GAD7 TOTAL SCORE: 10
GAD7 TOTAL SCORE: 10
6. BECOMING EASILY ANNOYED OR IRRITABLE: NEARLY EVERY DAY
4. TROUBLE RELAXING: SEVERAL DAYS
3. WORRYING TOO MUCH ABOUT DIFFERENT THINGS: MORE THAN HALF THE DAYS
1. FEELING NERVOUS, ANXIOUS, OR ON EDGE: MORE THAN HALF THE DAYS
7. FEELING AFRAID AS IF SOMETHING AWFUL MIGHT HAPPEN: SEVERAL DAYS
IF YOU CHECKED OFF ANY PROBLEMS ON THIS QUESTIONNAIRE, HOW DIFFICULT HAVE THESE PROBLEMS MADE IT FOR YOU TO DO YOUR WORK, TAKE CARE OF THINGS AT HOME, OR GET ALONG WITH OTHER PEOPLE: SOMEWHAT DIFFICULT

## 2023-11-09 NOTE — PROGRESS NOTES
Virtual Visit Details    Type of service:  Video Visit     Originating Location (pt. Location): Home    Distant Location (provider location):  Off-site  Platform used for Video Visit: Located within [facility] Psychiatry Consult Note    IDENTIFICATION   Name: Petra Patel   : 1985/38 year old      Sex:    @ female          Telemedicine Visit: The patient's condition can be safely assessed and treated via synchronous audio and visual telemedicine encounter.        Face to Face/patient Contact total time: 18 minutes  Pre Charting time: 0 minutes; Post charting time, communication and other activities: 1 minutes; Total time 19 minutes  3:44 PM - 4:02PM          SUBJECTIVE   Mirtazapine - waking up a lot but then very tired in the daytime initially. Took for 3.5 weeks and gained 7lbs and stopped. Restarted lorazepam. Led to bad eating and worse depressive and anxiety sx in context of poor diet, stopping working out, bad trial with mirtazapine and menses.      The following assessments were completed by patient for this visit:  PROMIS 10-Global Health (only subscores and total score):       2023     5:53 PM 2023    11:33 AM 10/27/2023     9:46 AM 2023    11:24 AM   PROMIS-10 Scores Only   Global Mental Health Score 11 7 14 10   Global Physical Health Score 17 17 16 17   PROMIS TOTAL - SUBSCORES 28 24 30 27             2023     1:54 PM 10/13/2023    12:00 PM 10/27/2023     9:45 AM   PHQ   PHQ-9 Total Score 7 3 10   Q9: Thoughts of better off dead/self-harm past 2 weeks Not at all Not at all Not at all          2023    11:32 AM 2023     1:55 PM 2023    11:23 AM   MICHELLE-7 SCORE   Total Score 19 (severe anxiety) 9 (mild anxiety) 10 (moderate anxiety)   Total Score 19 9 10        OBJECTIVE     Vital Signs:   There were no vitals taken for this visit.    Labs:  na    Current Medications:  Current Outpatient Medications   Medication    Ascorbic Acid (VITAMIN C) 500 MG PACK     busPIRone (BUSPAR) 5 MG tablet    COMPOUNDED NON-CONTROLLED SUBSTANCE (CMPD RX) - PHARMACY TO MIX COMPOUNDED MEDICATION    LORazepam (ATIVAN) 0.5 MG tablet    mirtazapine (REMERON) 7.5 MG tablet    multivitamin, therapeutic (THERA-VIT) TABS tablet    vitamin B complex with vitamin C (STRESS TAB) tablet    Vitamin D, Cholecalciferol, 25 MCG (1000 UT) CAPS    zinc gluconate 50 MG tablet     No current facility-administered medications for this visit.        The [place] Prescription Monitoring Program has been reviewed and there are no concerns about diversionary activity for controlled substances at this time.        ADDED HISTORY   na    MENTAL STATUS EXAMINATION:   Appearance: Good attention to grooming and hygiene, casual garb  Attitude: Cooperative  Eye Contact: Good  Gait and Station: sitting  Psychomotor Behavior: Within normal limits  Oriented to: Grossly person place and time  Attention Span and Concentration: Grossly intact  Speech: Normal tone  Language: English  Mood:  Fair  Affect: Mildly constricted  Associations:  no loose associations  Thought Process:  logical, linear and goal oriented  Thought Content: No evidence of delusions or suicidal or homicidal ideation plan or intent  Memory: Good  Fund of Knowledge: Good  Insight:  good  Judgment:  intact, adequate for safety  Impulse Control:  intact        DIAGNOSES:   Premenstrual dysphoric disorder  Unspecified anxiety disorder  Insomnia disorder      ASSESSMENT:   Patient with mood dysregulation, insomnia, anxiety and even at times has had suicidal ideation specifically in context of period of time 2 weeks preceding menses.  Buspirone with partial efficacy for PMDD, add fluoxetine-SSRIs are indicated.  Given serotonin syndrome concern, utilize compounding pharmacy for minimal dosage titration.  Currently insomnia stabilized with lorazepam 0.5 mg nightly, though in general outside of PMDD episodes using 0.25 mg.  Benefits outweigh risks of long-term  benzodiazepine management.    Will be doing CBT and insomnia in hopes of avoiding lorazepam outside of PMDD episodes.  Has not tolerated other sedative trials and father had incident of harder while on a sedative.    Today Petra Patel reports no suicidal ideations. In addition, she has notable risk factors for self-harm, including anxiety, prior suicide attempts. However, risk is mitigated by commitment to family and history of seeking help when needed. Therefore, based on all available evidence including the factors cited above, she does not appear to be at imminent risk for self-harm, does not meet criteria for a 72-hr hold, and therefore remains appropriate for ongoing outpatient level of care.       PLAN:     Patient advised of consultative model. Patient will continue to be seen for ongoing consultation and stabilization.  Does not meet criteria for involuntary treatment or hospitalization  Add escitalopram 8/16/2023 0.5 mg daily compounded in liquid very effective for mood/anxiety, maybe too effective and too little anxiety, consider 0.4-Risks, benefits and alternatives discussed.  Patient provides verbal consent to treatment.  Provider called compounding pharmacy for guidance on prescription.  Continue buspirone 2.5 mg p.o. twice daily partial efficacy-provides verbal consent to treatment   Trial mirtazapine 10/5/23 7.5 mg po at bedtime prn insomnia -Risks, benefits and alternatives discussed.  Patient provides verbal consent to treatment.to try and replace lorazepam  Backup - suvorexant  Lorazepam 0.25 mg nightly/0.5 mg nightly for PMDD episode => 10/5/23 0.25 mg po qhs-Risks, benefits and alternatives discussed.  Patient provides verbal consent to treatment.  Was advised of little bit of memory loss with long-term use.  Benefits significantly outweigh risks given suicidality, mood dysregulation and other concerns in context of bad side effects with other medications. Has to take lorazepam nightly due  to rebound insomnia when med is needed during PMDD.  90 day supply provided  Dc'd fluoxetine (panic attacks, insomnia, wired), mirtazapine (weight gain, carb cravings)  Labs -reviewed, no further labs indicated at this time  Consultation continues with MAIDA Hernandez, LisyD  Therapy with TASH Kang for PMDD  Sleep psychology - referred for Remedios Peters  Return in 4-6 weeks    Administrative Billing:   Time spent with patient was greater than 50% of time and/or significant time was spent in counseling and coordination of care regarding above diagnoses and treatment plan. Pre charting time and post charting time/documentation/coordination are done on date of service.      Signed:   Saad Garza M.D.  [facility] Psychiatry Service    Disclaimer: This note consists of symbols derived from keyboarding, dictation and/or voice recognition software. As a result, there may be errors in the script that have gone undetected. Please consider this when interpreting information found in this chart.

## 2023-11-09 NOTE — NURSING NOTE
Is the patient currently in the state of [place]? YES    Visit mode:VIDEO    If the visit is dropped, the patient can be reconnected by: VIDEO VISIT: Text to cell phone:   Telephone Information:   Mobile [phone]       Will anyone else be joining the visit? NO  (If patient encounters technical issues they should call [phone]    How would you like to obtain your AVS? MyChart    Are changes needed to the allergy or medication list? Pt stated no changes to allergies and Pt stated no med changes    Reason for visit: ELFEGO DIAZ

## 2023-12-06 ENCOUNTER — OFFICE VISIT (OUTPATIENT)
Dept: DERMATOLOGY | Facility: CLINIC | Age: 38
End: 2023-12-06
Payer: COMMERCIAL

## 2023-12-06 DIAGNOSIS — L82.0 INFLAMED SEBORRHEIC KERATOSIS: ICD-10-CM

## 2023-12-06 DIAGNOSIS — D22.9 MULTIPLE BENIGN NEVI: Primary | ICD-10-CM

## 2023-12-06 DIAGNOSIS — L82.1 SEBORRHEIC KERATOSES: ICD-10-CM

## 2023-12-06 PROCEDURE — 99203 OFFICE O/P NEW LOW 30 MIN: CPT | Mod: 25 | Performed by: STUDENT IN AN ORGANIZED HEALTH CARE EDUCATION/TRAINING PROGRAM

## 2023-12-06 PROCEDURE — 17110 DESTRUCTION B9 LES UP TO 14: CPT | Performed by: STUDENT IN AN ORGANIZED HEALTH CARE EDUCATION/TRAINING PROGRAM

## 2023-12-06 NOTE — PROGRESS NOTES
[facility] Dermatology Note    Encounter Date: Dec 6, 2023    Dermatology Problem List:    ______________________________________    Impression/Plan:  Jessica was seen today for derm problem.    Diagnoses and all orders for this visit:    Seborrheic keratoses  - benign    Multiple benign nevi  - Reviewed the compounding benefits of incremental changes to sun protective clothing behaviors including increased frequency of sunscreen and sun protective clothing like broad brimmed hats and longsleeved UPF containing clothing    Inflamed seborrheic keratosis  -     DESTRUCT BENIGN LESION, UP TO 14  -X3 on the back and right abdomen5      Cryotherapy procedure note: After verbal consent and discussion of risks and benefits including but no limited to dyspigmentation/scar, blister, and pain, 3 I SKs on the back and the abdomen was(were) treated with 1-2mm freeze border for 2 cycles with liquid nitrogen. Post cryotherapy instructions were provided.     Follow-up PRN.       Staff Involved:  Staff Only    Jose Aguirre MD   of Dermatology  Department of Dermatology  [facility]      CC:   Chief Complaint   Patient presents with    Derm Problem     Moles on certain areas of the body to check for skin cancer        History of Present Illness:  Ms. Petra Patel is a 38 year old female who presents as a new patient.    Has moles she has had for years. One was checked out by her PCP which has grown.  She has some on the back which are irritating to her    Labs:      Physical exam:  Vitals: There were no vitals taken for this visit.  GEN: well developed, well-nourished, in no acute distress, in a pleasant mood.     SKIN: Simons phototype 1  - Full skin, which includes the head/face, both arms, chest, back, abdomen,both legs, genitalia and/or groin buttocks, digits and/or nails, was examined.  - scattered brown papules on trunk and extremities   - Stuck on  brown papules on trunk and extremities   - No other lesions of concern on areas examined.     Past Medical History:   Past Medical History:   Diagnosis Date    Anxiety     Asthma     Depressive disorder     LSIL (low grade squamous intraepithelial lesion) on Pap smear 09/01/2011    noncompliant with colp     Past Surgical History:   Procedure Laterality Date    APPENDECTOMY      ESOPHAGOSCOPY, GASTROSCOPY, DUODENOSCOPY (EGD), COMBINED  3/20/2013    Procedure: COMBINED ESOPHAGOSCOPY, GASTROSCOPY, DUODENOSCOPY (EGD), BIOPSY SINGLE OR MULTIPLE;;  Surgeon: Steffen Bran MD;  Location:  GI       Social History:   reports that she has never smoked. She has never used smokeless tobacco. She reports current alcohol use. She reports that she does not use drugs.    Family History:  Family History   Problem Relation Age of Onset    Cardiovascular Father     Cardiovascular Maternal Grandmother     Cerebrovascular Disease Maternal Grandmother     Allergies Maternal Grandmother     Cardiovascular Paternal Grandmother     Cardiovascular Maternal Grandfather     Cardiovascular Paternal Grandfather     Allergies Mother     Depression Mother     Depression Sister        Medications:  Current Outpatient Medications   Medication Sig Dispense Refill    Ascorbic Acid (VITAMIN C) 500 MG PACK Take 1 packet by mouth daily as needed      busPIRone (BUSPAR) 5 MG tablet Take 0.5 tablets (2.5 mg) by mouth 2 times daily 90 tablet 0    COMPOUNDED NON-CONTROLLED SUBSTANCE (CMPD RX) - PHARMACY TO MIX COMPOUNDED MEDICATION Escitalopram take 0.5 mg once a day compounded in liquid specifically, with three refills 30 each 3    LORazepam (ATIVAN) 0.5 MG tablet Take 1 tablet (0.5 mg) by mouth nightly as needed for sleep 30 tablet 3    multivitamin, therapeutic (THERA-VIT) TABS tablet Take 1 tablet by mouth daily      vitamin B complex with vitamin C (STRESS TAB) tablet Take 1 tablet by mouth daily      Vitamin D, Cholecalciferol, 25 MCG (1000 UT) CAPS  Take 1,000 Units by mouth daily      zinc gluconate 50 MG tablet Take 50 mg by mouth daily as needed       Allergies   Allergen Reactions    Fluoxetine Other (See Comments)     Panic, high activation even with 1 mg    Seasonal Allergies     Septra [Sulfamethoxazole-Trimethoprim] Swelling    Sulfa Antibiotics      Sister had near fatal reaction    Sulfamethoxazole-Trimethoprim     Wellbutrin [Bupropion Hcl]      Hallucination    Wellbutrin [Bupropion]

## 2023-12-06 NOTE — LETTER
12/6/2023         RE: Petra Patel  [address]  [place] [facility] [place] [address]        Dear Colleague,    Thank you for referring your patient, Petra Patel, to the [facility]. Please see a copy of my visit note below.    [facility] Dermatology Note    Encounter Date: Dec 6, 2023    Dermatology Problem List:    ______________________________________    Impression/Plan:  Jessica was seen today for derm problem.    Diagnoses and all orders for this visit:    Seborrheic keratoses  - benign    Multiple benign nevi  - Reviewed the compounding benefits of incremental changes to sun protective clothing behaviors including increased frequency of sunscreen and sun protective clothing like broad brimmed hats and longsleeved UPF containing clothing    Inflamed seborrheic keratosis  -     DESTRUCT BENIGN LESION, UP TO 14  -X3 on the back and right abdomen5      Cryotherapy procedure note: After verbal consent and discussion of risks and benefits including but no limited to dyspigmentation/scar, blister, and pain, 3 I SKs on the back and the abdomen was(were) treated with 1-2mm freeze border for 2 cycles with liquid nitrogen. Post cryotherapy instructions were provided.     Follow-up PRN.       Staff Involved:  Staff Only    Jose Aguirre MD   of Dermatology  Department of Dermatology  [facility]      CC:   Chief Complaint   Patient presents with     Derm Problem     Moles on certain areas of the body to check for skin cancer        History of Present Illness:  Ms. Petra Patel is a 38 year old female who presents as a new patient.    Has moles she has had for years. One was checked out by her PCP which has grown.  She has some on the back which are irritating to her    Labs:      Physical exam:  Vitals: There were no vitals taken for this visit.  GEN: well developed, well-nourished, in no acute distress, in a pleasant  mood.     SKIN: Simons phototype 1  - Full skin, which includes the head/face, both arms, chest, back, abdomen,both legs, genitalia and/or groin buttocks, digits and/or nails, was examined.  - scattered brown papules on trunk and extremities   - Stuck on brown papules on trunk and extremities   - No other lesions of concern on areas examined.     Past Medical History:   Past Medical History:   Diagnosis Date     Anxiety      Asthma      Depressive disorder      LSIL (low grade squamous intraepithelial lesion) on Pap smear 09/01/2011    noncompliant with colp     Past Surgical History:   Procedure Laterality Date     APPENDECTOMY       ESOPHAGOSCOPY, GASTROSCOPY, DUODENOSCOPY (EGD), COMBINED  3/20/2013    Procedure: COMBINED ESOPHAGOSCOPY, GASTROSCOPY, DUODENOSCOPY (EGD), BIOPSY SINGLE OR MULTIPLE;;  Surgeon: Steffen Bran MD;  Location:  GI       Social History:   reports that she has never smoked. She has never used smokeless tobacco. She reports current alcohol use. She reports that she does not use drugs.    Family History:  Family History   Problem Relation Age of Onset     Cardiovascular Father      Cardiovascular Maternal Grandmother      Cerebrovascular Disease Maternal Grandmother      Allergies Maternal Grandmother      Cardiovascular Paternal Grandmother      Cardiovascular Maternal Grandfather      Cardiovascular Paternal Grandfather      Allergies Mother      Depression Mother      Depression Sister        Medications:  Current Outpatient Medications   Medication Sig Dispense Refill     Ascorbic Acid (VITAMIN C) 500 MG PACK Take 1 packet by mouth daily as needed       busPIRone (BUSPAR) 5 MG tablet Take 0.5 tablets (2.5 mg) by mouth 2 times daily 90 tablet 0     COMPOUNDED NON-CONTROLLED SUBSTANCE (CMPD RX) - PHARMACY TO MIX COMPOUNDED MEDICATION Escitalopram take 0.5 mg once a day compounded in liquid specifically, with three refills 30 each 3     LORazepam (ATIVAN) 0.5 MG tablet Take 1 tablet  (0.5 mg) by mouth nightly as needed for sleep 30 tablet 3     multivitamin, therapeutic (THERA-VIT) TABS tablet Take 1 tablet by mouth daily       vitamin B complex with vitamin C (STRESS TAB) tablet Take 1 tablet by mouth daily       Vitamin D, Cholecalciferol, 25 MCG (1000 UT) CAPS Take 1,000 Units by mouth daily       zinc gluconate 50 MG tablet Take 50 mg by mouth daily as needed       Allergies   Allergen Reactions     Fluoxetine Other (See Comments)     Panic, high activation even with 1 mg     Seasonal Allergies      Septra [Sulfamethoxazole-Trimethoprim] Swelling     Sulfa Antibiotics      Sister had near fatal reaction     Sulfamethoxazole-Trimethoprim      Wellbutrin [Bupropion Hcl]      Hallucination     Wellbutrin [Bupropion]                Again, thank you for allowing me to participate in the care of your patient.        Sincerely,        Jose Aguirre MD

## 2023-12-15 ENCOUNTER — VIRTUAL VISIT (OUTPATIENT)
Dept: PSYCHOLOGY | Facility: CLINIC | Age: 38
End: 2023-12-15
Payer: COMMERCIAL

## 2023-12-15 DIAGNOSIS — F32.81 PMDD (PREMENSTRUAL DYSPHORIC DISORDER): Primary | ICD-10-CM

## 2023-12-15 DIAGNOSIS — F41.1 GENERALIZED ANXIETY DISORDER: ICD-10-CM

## 2023-12-15 PROCEDURE — 90834 PSYTX W PT 45 MINUTES: CPT | Mod: VID | Performed by: MARRIAGE & FAMILY THERAPIST

## 2023-12-15 NOTE — PROGRESS NOTES
[facility]                                     Progress Note    Patient Name: Petra Patel  Date: 12-15-23         Service Type: Individual      Session Start Time: 10am  Session End Time: 1050am     Session Length: 50min    Session #: 5    Attendees: Client attended alone    Service Modality:  Video    Telemedicine Visit: The patient's condition can be safely assessed and treated via synchronous audio and visual telemedicine encounter.      Reason for Telemedicine Visit: Patient has requested telehealth visit    Originating Site (Patient Location): Patient's home      Distant Location (provider location):  On-site    Consent:  The patient/guardian has verbally consented to: the potential risks and benefits of telemedicine (video visit) versus in person care; bill my insurance or make self-payment for services provided; and responsibility for payment of non-covered services.     Mode of Communication:  Video Conference via [employer]    As the provider I attest to compliance with applicable laws and regulations related to telemedicine.     Treatment plan- 9-19-23  PHQ and MICHELLE- 12-15-23  Promis- 12-14-23    DATA  Interactive Complexity: No  Crisis: No        Progress Since Last Session (Related to Symptoms / Goals / Homework):   Symptoms: Client has been struggling with PMDD and anger.  Checked suicidal thoughts on the PHQ but after we explored this it was more parenting type guilt.       Homework: Achieved / completed to satisfaction  Completed in session      Episode of Care Goals: Satisfactory progress - ACTION (Actively working towards change); Intervened by reinforcing change plan / affirming steps taken     Current / Ongoing Stressors and Concerns:   -History of PMDD   -Has been struggling more with anger and feels she sometimes takes this out on her daughter. Trying to recognize that she is just a kid.    -Working on managing anxiety and racing thoughts.    -Working on concrete  medication plan.   -Daughter did start prozac.    -Spouse is emotionally immature.    -Not catastrophizing as much and feeling she is taking a more problem solving approach to issues.    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  Had finals this week.       Treatment Objective(s) Addressed in This Session:   Patient will identify 5 ways to improve quality of life when she is struggling with PMDD  Patient will work on building coping skills around anxiety.    Anger management        Intervention:   Continue with medication management and use daily medication along with as needed anti anxiety medications.     Reviewed anger management skills.    Be mindful about adding too much on.    Work on catching and identifying cognitive distortions.     Have good boundaries with spouse and have social outlets out of the home.     Work on college work while daughter is at dance.   Reviewed safety concerns but as reviewed,identified it was more thoughts about feeling worthless or like a bad parent.      Assessments completed prior to visit:  The following assessments were completed by patient for this visit:  PHQ2:       11/9/2023     3:22 PM 2/23/2023     9:50 AM 4/6/2017     1:20 PM 3/21/2016     2:14 PM 6/22/2012     3:03 PM   PHQ-2 ( 1999 [employer])   Q1: Little interest or pleasure in doing things 1 2 0 0 2   Q2: Feeling down, depressed or hopeless 1 2 0 0 3   PHQ-2 Score 2 4 0 0 5   Q1: Little interest or pleasure in doing things Several days More than half the days    More than half the days      Q2: Feeling down, depressed or hopeless Several days More than half the days    More than half the days      PHQ-2 Score 2 4    4        PHQ9:       2/23/2023     9:50 AM 6/26/2023     8:49 AM 7/27/2023    12:31 PM 9/19/2023     1:54 PM 10/13/2023    12:00 PM 10/27/2023     9:45 AM 12/14/2023     9:08 AM   PHQ-9 SCORE   PHQ-9 Total Score MyChart 17 (Moderately severe depression) 10 (Moderate depression)  10 (Moderate depression) 7 (Mild depression) 3 (Minimal depression) 10 (Moderate depression) 10 (Moderate depression)   PHQ-9 Total Score 17 10 10 7 3 10 10     GAD2:       8/10/2023     8:48 AM 9/19/2023     1:55 PM 10/5/2023     7:18 AM 10/6/2023     8:58 AM 10/27/2023     9:45 AM 11/9/2023    11:23 AM 12/14/2023     9:06 AM   MICHELLE-2   Feeling nervous, anxious, or on edge 1 3 1 1 1 2 1    1   Not being able to stop or control worrying 1 1 1 1 0 1 1    1   MICHELLE-2 Total Score 2 4 2 2 1 3 2    2     GAD7:       4/13/2017     2:13 PM 7/24/2023    11:32 AM 9/19/2023     1:55 PM 11/9/2023    11:23 AM   MICHELLE-7 SCORE   Total Score  19 (severe anxiety) 9 (mild anxiety) 10 (moderate anxiety)   Total Score 14 19 9 10     PROMIS 10-Global Health (all questions and answers displayed):       6/19/2023     5:53 PM 7/24/2023    11:33 AM 10/27/2023     9:46 AM 11/9/2023    11:24 AM 12/14/2023     9:09 AM   PROMIS 10   In general, would you say your health is: Very good Very good Very good Very good Good   In general, would you say your quality of life is: Good Good Very good Good Good   In general, how would you rate your physical health? Very good Good Very good Very good Good   In general, how would you rate your mental health, including your mood and your ability to think? Good Poor Good Fair Fair   In general, how would you rate your satisfaction with your social activities and relationships? Fair Fair Very good Good Good   In general, please rate how well you carry out your usual social activities and roles Fair Fair Very good Good Good   To what extent are you able to carry out your everyday physical activities such as walking, climbing stairs, carrying groceries, or moving a chair? Completely Completely Completely Completely Completely   In the past 7 days, how often have you been bothered by emotional problems such as feeling anxious, depressed, or irritable? Sometimes Always Sometimes Often Sometimes   In the past 7 days,  how would you rate your fatigue on average? Moderate Mild Severe Moderate Moderate   In the past 7 days, how would you rate your pain on average, where 0 means no pain, and 10 means worst imaginable pain? 0 0 0 0 0   In general, would you say your health is: 4 4 4 4 3   In general, would you say your quality of life is: 3 3 4 3 3   In general, how would you rate your physical health? 4 3 4 4 3   In general, how would you rate your mental health, including your mood and your ability to think? 3 1 3 2 2   In general, how would you rate your satisfaction with your social activities and relationships? 2 2 4 3 3   In general, please rate how well you carry out your usual social activities and roles. (This includes activities at home, at work and in your community, and responsibilities as a parent, child, spouse, employee, friend, etc.) 2 2 4 3 3   To what extent are you able to carry out your everyday physical activities such as walking, climbing stairs, carrying groceries, or moving a chair? 5 5 5 5 5   In the past 7 days, how often have you been bothered by emotional problems such as feeling anxious, depressed, or irritable? 3 5 3 4 3   In the past 7 days, how would you rate your fatigue on average? 3 2 4 3 3   In the past 7 days, how would you rate your pain on average, where 0 means no pain, and 10 means worst imaginable pain? 0 0 0 0 0   Global Mental Health Score 11 7 14 10 11   Global Physical Health Score 17 17 16 17 16   PROMIS TOTAL - SUBSCORES 28 24 30 27 27     PROMIS 10-Global Health (only subscores and total score):       6/19/2023     5:53 PM 7/24/2023    11:33 AM 10/27/2023     9:46 AM 11/9/2023    11:24 AM 12/14/2023     9:09 AM   PROMIS-10 Scores Only   Global Mental Health Score 11 7 14 10 11   Global Physical Health Score 17 17 16 17 16   PROMIS TOTAL - SUBSCORES 28 24 30 27 27     [place] Suicide Severity Rating Scale (Lifetime/Recent)       No data to display                  ASSESSMENT: Current  Emotional / Mental Status (status of significant symptoms):   Risk status (Self / Other harm or suicidal ideation)   Patient denies current fears or concerns for personal safety.   Patient denies current or recent suicidal ideation or behaviors.   Patient denies current or recent homicidal ideation or behaviors.   Patient denies current or recent self injurious behavior or ideation.   Patient denies other safety concerns.   Patient reports there has been no change in risk factors since their last session.     Patient reports there has been no change in protective factors since their last session.     Recommended that patient call 911 or go to the local ED should there be a change in any of these risk factors.     Appearance:   Appropriate    Eye Contact:   Good    Psychomotor Behavior: Normal    Attitude:   Cooperative    Orientation:   All   Speech    Rate / Production: Normal     Volume:  Normal    Mood:    Anxious    Affect:    Appropriate    Thought Content:  Clear    Thought Form:  Coherent  Logical    Insight:    Good      Medication Review:   No changes to current psychiatric medication(s)     Medication Compliance:   Yes     Changes in Health Issues:   None reported     Chemical Use Review:   Substance Use: Chemical use reviewed, no active concerns identified      Tobacco Use: No current tobacco use.      Diagnosis:  300.02 (F41.1) Generalized Anxiety Disorder  PMDD F32.81    Collateral Reports Completed:   Not Applicable    PLAN: (Patient Tasks / Therapist Tasks / Other)  -Work on identifying and catching cognitive distortions.    -Review anger management techniques.   -Continue with medication management and use as needed anti anxiety medications when experiencing panic.   -Do homework while daughter is at dance. Stay out of dance studio when able as this can be a trigger.   -Have social time planned outside of the home.    -Be mindful about filling schedule too much.         MARCIE Judge                                                          ______________________________________________________________________    Individual Treatment Plan    Patient's Name: Petra Patel  YOB: 1985    Date of Creation: 9-19-23  Date Treatment Plan Last Reviewed/Revised: 9-19-23    Diagnoses:  300.02 (F41.1) Generalized Anxiety Disorder  PMDD F32.81  Psychosocial & Contextual Factors: History of multiple traumas   PROMIS (reviewed every 90 days): 27    Referral / Collaboration:  Referral to another professional/service is not indicated at this time..    Anticipated number of session for this episode of care: 6-9 sessions  Anticipation frequency of session: Biweekly  Anticipated Duration of each session: 38-52 minutes  Treatment plan will be reviewed in 90 days or when goals have been changed.       MeasurableTreatment Goal(s) related to diagnosis / functional impairment(s)  Goal 1: Patient will address struggles with PMDD and anxiety     I will know I've met my goal when I am not having racing thoughts .      Objective #A (Patient Action)    Patient will use thought-stopping strategy daily to reduce intrusive thoughts.  Status: New - Date: 9-19-23      Intervention(s)  Therapist will use CBT and solution focused therapy.     Objective #B  Patient will work on identifying 5 ways to manage PMDD symptoms.    Status: New - Date: 9-19-23      Intervention(s)  Therapist will use CBT and solution focused therapy.     Objective #C  Patient will use at least 10 coping skills for anxiety management in the next 24 weeks.  Status: New - Date: 9-19-23      Intervention(s)  Therapist will use CBT and solution focused therapy           Patient has reviewed and agreed to the above plan.      Judie Kang, LMFT  September 19, 2023

## 2023-12-21 ENCOUNTER — VIRTUAL VISIT (OUTPATIENT)
Dept: PSYCHIATRY | Facility: CLINIC | Age: 38
End: 2023-12-21
Payer: COMMERCIAL

## 2023-12-21 DIAGNOSIS — G47.00 INSOMNIA, UNSPECIFIED TYPE: ICD-10-CM

## 2023-12-21 DIAGNOSIS — F41.1 GENERALIZED ANXIETY DISORDER: ICD-10-CM

## 2023-12-21 DIAGNOSIS — F32.81 PMDD (PREMENSTRUAL DYSPHORIC DISORDER): ICD-10-CM

## 2023-12-21 DIAGNOSIS — F51.01 PRIMARY INSOMNIA: Primary | ICD-10-CM

## 2023-12-21 PROCEDURE — 99214 OFFICE O/P EST MOD 30 MIN: CPT | Mod: VID | Performed by: PSYCHIATRY & NEUROLOGY

## 2023-12-21 RX ORDER — BUSPIRONE HYDROCHLORIDE 5 MG/1
2.5 TABLET ORAL 2 TIMES DAILY
Qty: 90 TABLET | Refills: 0 | Status: SHIPPED | OUTPATIENT
Start: 2023-12-21 | End: 2024-03-22

## 2023-12-21 RX ORDER — LORAZEPAM 0.5 MG/1
0.5 TABLET ORAL
Qty: 30 TABLET | Refills: 3 | Status: SHIPPED | OUTPATIENT
Start: 2023-12-21 | End: 2024-07-16

## 2023-12-21 NOTE — PATIENT INSTRUCTIONS
Moving from: Collaborative Care Psychiatry Service ([facility]PS)    Moving to: Referring Provider        We are returning your care back to your Referring Provider.      We will update your Referring Provider/Clinic that you've completed your care with [facility]. This way, they can help you build on the progress you've made in your mental health.        Here's what happens next:    Within the next 3 months: Please set up a visit with your referring provider. Ask for a mental health check-in.  Stay on your current medications--do not change your doses. Your symptoms could get worse if you quickly stop or decrease your medicine.  We've refilled your mental health medications for the next 3 months (90 days). Future refills or dose changes should come from your Referring Provider Clinic.    If you're in therapy, keep it up! If you're not but would like to start, ask your Referring Provider clinic for a referral to therapy, or call [facility] ([phone]) to set up a visit with a therapist.        It's been a pleasure to work with you! If you and your clinic decide that you should return to [facility] in the future, we remain ready to serve you. Ask your clinic for a new referral if needed.

## 2023-12-21 NOTE — NURSING NOTE
Is the patient currently in the state of [place]? YES    Visit mode:VIDEO    If the visit is dropped, the patient can be reconnected by: VIDEO VISIT: Text to cell phone:   Telephone Information:   Mobile [phone]       Will anyone else be joining the visit? NO  (If patient encounters technical issues they should call [phone])    How would you like to obtain your AVS? MyChart    Are changes needed to the allergy or medication list? No    Reason for visit: RECHECK    Lainey DIAZ

## 2023-12-21 NOTE — PROGRESS NOTES
Virtual Visit Details    Type of service:  Video Visit     Originating Location (pt. Location): Home    Distant Location (provider location):  Off-site  Platform used for Video Visit: [facility] Psychiatry Consult Note    IDENTIFICATION   Name: Petra Patel   : 1985/38 year old      Sex:    @ female          Telemedicine Visit: The patient's condition can be safely assessed and treated via synchronous audio and visual telemedicine encounter.        Face to Face/patient Contact total time: 11 minutes  Pre Charting time: 1 minutes; Post charting time, communication and other activities: 8 minutes; Total time 20 minutes  3:40 PM - 3:51PM          SUBJECTIVE   Doing good. Needed time to recover from mirtazapine. Found a situation that normally would be a trigger was able to work through with a breeze. Was able to move on with client situation. Before would sit on it for weeks.       The following assessments were completed by patient for this visit:  PROMIS 10-Global Health (only subscores and total score):       2023     5:53 PM 2023    11:33 AM 10/27/2023     9:46 AM 2023    11:24 AM 2023     9:09 AM   PROMIS-10 Scores Only   Global Mental Health Score 11 7 14 10 11   Global Physical Health Score 17 17 16 17 16   PROMIS TOTAL - SUBSCORES 28 24 30 27 27             10/13/2023    12:00 PM 10/27/2023     9:45 AM 2023     9:08 AM   PHQ   PHQ-9 Total Score 3 10 10   Q9: Thoughts of better off dead/self-harm past 2 weeks Not at all Not at all Several days   F/U: Thoughts of suicide or self-harm   No   F/U: Safety concerns   No          2023    11:32 AM 2023     1:55 PM 2023    11:23 AM   MICHELLE-7 SCORE   Total Score 19 (severe anxiety) 9 (mild anxiety) 10 (moderate anxiety)   Total Score 19 9 10        OBJECTIVE     Vital Signs:   There were no vitals taken for this visit.    Labs:  na    Current Medications:  Current Outpatient Medications   Medication     Ascorbic Acid (VITAMIN C) 500 MG PACK    busPIRone (BUSPAR) 5 MG tablet    COMPOUNDED NON-CONTROLLED SUBSTANCE (CMPD RX) - PHARMACY TO MIX COMPOUNDED MEDICATION    LORazepam (ATIVAN) 0.5 MG tablet    multivitamin, therapeutic (THERA-VIT) TABS tablet    vitamin B complex with vitamin C (STRESS TAB) tablet    Vitamin D, Cholecalciferol, 25 MCG (1000 UT) CAPS    zinc gluconate 50 MG tablet     No current facility-administered medications for this visit.        The [place] Prescription Monitoring Program has been reviewed and there are no concerns about diversionary activity for controlled substances at this time.        ADDED HISTORY   na    MENTAL STATUS EXAMINATION:   Appearance: Good attention to grooming and hygiene, casual garb  Attitude: Cooperative  Eye Contact: Good  Gait and Station: sitting  Psychomotor Behavior: Within normal limits  Oriented to: Grossly person place and time  Attention Span and Concentration: Grossly intact  Speech: Normal tone  Language: English  Mood:  good  Affect: euthymic  Associations:  no loose associations  Thought Process:  logical, linear and goal oriented  Thought Content: No evidence of delusions or suicidal or homicidal ideation plan or intent  Memory: Good  Fund of Knowledge: Good  Insight:  good  Judgment:  intact, adequate for safety  Impulse Control:  intact        DIAGNOSES:   Premenstrual dysphoric disorder  Unspecified anxiety disorder  Insomnia disorder      ASSESSMENT:   Patient with mood dysregulation, insomnia, anxiety and even at times has had suicidal ideation specifically in context of period of time 2 weeks preceding menses.  Buspirone with partial efficacy for PMDD, add fluoxetine-SSRIs are indicated.  Given serotonin syndrome concern, utilize compounding pharmacy for minimal dosage titration.  Insomnia stable on lorazepam; failed multiple sedative trials. Benefits outweigh risks of long-term benzodiazepine management.      Today Petra Patel reports no  suicidal ideations. In addition, she has notable risk factors for self-harm, including anxiety, prior suicide attempts. However, risk is mitigated by commitment to family and history of seeking help when needed. Therefore, based on all available evidence including the factors cited above, she does not appear to be at imminent risk for self-harm, does not meet criteria for a 72-hr hold, and therefore remains appropriate for ongoing outpatient level of care.       PLAN:     Consult Completed, returned to PCP  Does not meet criteria for involuntary treatment or hospitalization  Add escitalopram 8/16/2023 0.5 mg daily compounded in liquid very effective for mood/anxiety, maybe too effective and too little anxiety, consider 0.4-Risks, benefits and alternatives discussed.  Patient provides verbal consent to treatment.  Provider called compounding pharmacy for guidance on prescription.  Continue buspirone 2.5 mg p.o. twice daily partial efficacy-provides verbal consent to treatment   Backup - suvorexant  Lorazepam 0.25 mg nightly/0.5 mg nightly for PMDD episode => 10/5/23 0.25 mg po at bedtime => 0.5 mg po qhs-Risks, benefits and alternatives discussed.  Patient provides verbal consent to treatment.  Was advised of little bit of memory loss with long-term use.  Benefits significantly outweigh risks given suicidality, mood dysregulation and other concerns in context of bad side effects with other medications. Has to take lorazepam nightly due to rebound insomnia when med is needed during PMDD.  90 day supply provided  Dc'd fluoxetine (panic attacks, insomnia, wired), mirtazapine (weight gain, carb cravings)  Labs -reviewed, no further labs indicated at this time  Consultation continues with Lisy AguirreD  Therapy with TASH Kang for PMDD  Sleep psychology - referred for Remedios Peters  Return to PCP in 3 months    Administrative Billing:   Time spent with patient was greater than 50% of time and/or significant  time was spent in counseling and coordination of care regarding above diagnoses and treatment plan. Pre charting time and post charting time/documentation/coordination are done on date of service.      Signed:   Saad Garza M.D.  [facility]    Disclaimer: This note consists of symbols derived from keyboarding, dictation and/or voice recognition software. As a result, there may be errors in the script that have gone undetected. Please consider this when interpreting information found in this chart.      RETURN TO REFERRING [facility] FINAL TREATMENT PLAN  The Psychiatric provider and/or Behavioral health clinician (BHC) have completed consultation with the patient and are returning to referring provider care for continued care. For worsening symptoms/condition recommendations include:    Medication Recommendations   Buspirone can be increased to 2.5 mg po TID, and up to 2.5 mg po qid if needed   Escitalopram may be titrated by 0.1 or 0.2 mg increments, suggest soft cap of 1 mg; can be titrated further if tolerated and gets used to the previous dose step      Behavioral Recommendations  No Behavioral Consideration at this time.        Consider pharmacologic and nonpharmacologic recommendations, if the patient has followed through on recommendations/referrals and any other helpful pertinent information (e.g., recent stressors, med adherence, enough time on the medication or high enough dose etc.)      If post consult recommendations fail, use any of the following options   [facility] through Epic staff message for guidance   Order an Adult Behavioral Health eConsult ([id number]) or Pediatric eConsult ([id number])   Refer for another [facility]PS consultation (after 6 months) or refer to Psychiatry/Long-term management (Mental Health Referral 9037, [facility]/Med management and Long-term management)

## 2023-12-22 ENCOUNTER — TELEPHONE (OUTPATIENT)
Dept: PSYCHIATRY | Facility: CLINIC | Age: 38
End: 2023-12-22
Payer: COMMERCIAL

## 2023-12-22 DIAGNOSIS — F32.81 PMDD (PREMENSTRUAL DYSPHORIC DISORDER): ICD-10-CM

## 2023-12-22 NOTE — TELEPHONE ENCOUNTER
1) RN reviewed telephone encounter from [facility] regarding   Name of the medication requested:   COMPOUNDED NON-CONTROLLED SUBSTANCE (CMPD RX) - PHARMACY TO MIX COMPOUNDED MEDICATION 30 each 3 12/21/2023 -- No  Sig: Escitalopram take 0.5 mg once a day compounded in liquid specifically        2) RN reviewed treatment plan from 12/21/2023  ASSESSMENT:   Patient with mood dysregulation, insomnia, anxiety and even at times has had suicidal ideation specifically in context of period of time 2 weeks preceding menses.  Buspirone with partial efficacy for PMDD, add fluoxetine-SSRIs are indicated.  Given serotonin syndrome concern, utilize [facility] pharmacy for minimal dosage titration.  Insomnia stable on lorazepam; failed multiple sedative trials. Benefits outweigh risks of long-term benzodiazepine management.        Today Petra Patel reports no suicidal ideations. In addition, she has notable risk factors for self-harm, including anxiety, prior suicide attempts. However, risk is mitigated by commitment to family and history of seeking help when needed. Therefore, based on all available evidence including the factors cited above, she does not appear to be at imminent risk for self-harm, does not meet criteria for a 72-hr hold, and therefore remains appropriate for ongoing outpatient level of care.         PLAN:      Consult Completed, returned to PCP  Does not meet criteria for involuntary treatment or hospitalization  Add escitalopram 8/16/2023 0.5 mg daily compounded in liquid very effective for mood/anxiety, maybe too effective and too little anxiety, consider 0.4-Risks, benefits and alternatives discussed.  Patient provides verbal consent to treatment.  Provider called [facility] pharmacy for guidance on prescription.  Continue buspirone 2.5 mg p.o. twice daily partial efficacy-provides verbal consent to treatment   Backup - suvorexant  Lorazepam 0.25 mg nightly/0.5 mg nightly for PMDD episode => 10/5/23 0.25 mg po  at bedtime => 0.5 mg po qhs-Risks, benefits and alternatives discussed.  Patient provides verbal consent to treatment.  Was advised of little bit of memory loss with long-term use.  Benefits significantly outweigh risks given suicidality, mood dysregulation and other concerns in context of bad side effects with other medications. Has to take lorazepam nightly due to rebound insomnia when med is needed during PMDD.  90 day supply provided  Dc'd fluoxetine (panic attacks, insomnia, wired), mirtazapine (weight gain, carb cravings)  Labs -reviewed, no further labs indicated at this time  Consultation continues with MAIDA Hernandez, LisyD  Therapy with TASH Kang for PMDD  Sleep psychology - referred for Remedios Peters  Return to PCP in 3 months    3) RN called [facility]'s pharmacy at [phone] and spoke to Maya, they do not compound medications, this prescription will need to be routed to a different pharmacy that does compounding.     4) RN then called the patient at [phone] to let her know the prescription is going to be sent to [facility] - [place], [place] - [address] [place] [address]  as it has been sent there in the past.  The patient did not answer, RN LVM with the above information.    5) RN routing to Dr. Garza for review and new prescription.    Valerie Molina RN on 12/22/2023 at 9:23 AM

## 2023-12-22 NOTE — TELEPHONE ENCOUNTER
Reason for call:  Medication   If this is a refill request, has the caller requested the refill from the pharmacy already? Yes  Will the patient be using a [facility]? No  Name of the pharmacy and phone number for the current request:     [id number] - [place], [place] - [address]     Name of the medication requested:   COMPOUNDED NON-CONTROLLED SUBSTANCE (CMPD RX) - PHARMACY TO MIX COMPOUNDED MEDICATION 30 each 3 12/21/2023 -- No   Sig: Escitalopram take 0.5 mg once a day compounded in liquid specifically       Other request: rcvd call from pharmacist (Maya) they are not able to fill that prescription. She states they can not compound that and have questions about low dose.     Phone number to reach patient: she is avail till 2:00 @ [phone]    Best Time:  any    Can we leave a detailed message on this number?  YES    Travel screening: Not Applicable

## 2023-12-26 ENCOUNTER — ANCILLARY PROCEDURE (OUTPATIENT)
Dept: GENERAL RADIOLOGY | Facility: CLINIC | Age: 38
End: 2023-12-26
Attending: PHYSICIAN ASSISTANT
Payer: COMMERCIAL

## 2023-12-26 ENCOUNTER — OFFICE VISIT (OUTPATIENT)
Dept: FAMILY MEDICINE | Facility: CLINIC | Age: 38
End: 2023-12-26
Payer: COMMERCIAL

## 2023-12-26 ENCOUNTER — TELEPHONE (OUTPATIENT)
Dept: FAMILY MEDICINE | Facility: CLINIC | Age: 38
End: 2023-12-26

## 2023-12-26 VITALS
DIASTOLIC BLOOD PRESSURE: 64 MMHG | WEIGHT: 153 LBS | SYSTOLIC BLOOD PRESSURE: 100 MMHG | OXYGEN SATURATION: 94 % | HEART RATE: 85 BPM | RESPIRATION RATE: 16 BRPM | HEIGHT: 65 IN | TEMPERATURE: 98.1 F | BODY MASS INDEX: 25.49 KG/M2

## 2023-12-26 DIAGNOSIS — J06.9 UPPER RESPIRATORY TRACT INFECTION, UNSPECIFIED TYPE: Primary | ICD-10-CM

## 2023-12-26 LAB
DEPRECATED S PYO AG THROAT QL EIA: NEGATIVE
GROUP A STREP BY PCR: NOT DETECTED

## 2023-12-26 PROCEDURE — 71046 X-RAY EXAM CHEST 2 VIEWS: CPT | Mod: TC | Performed by: RADIOLOGY

## 2023-12-26 PROCEDURE — 99213 OFFICE O/P EST LOW 20 MIN: CPT | Performed by: PHYSICIAN ASSISTANT

## 2023-12-26 PROCEDURE — 87651 STREP A DNA AMP PROBE: CPT | Performed by: PHYSICIAN ASSISTANT

## 2023-12-26 RX ORDER — FLUTICASONE PROPIONATE 110 UG/1
1 AEROSOL, METERED RESPIRATORY (INHALATION) 2 TIMES DAILY
Qty: 12 G | Refills: 1 | Status: SHIPPED | OUTPATIENT
Start: 2023-12-26 | End: 2024-03-22

## 2023-12-26 ASSESSMENT — PAIN SCALES - GENERAL: PAINLEVEL: NO PAIN (0)

## 2023-12-26 ASSESSMENT — ASTHMA QUESTIONNAIRES: ACT_TOTALSCORE: 7

## 2023-12-26 NOTE — PROGRESS NOTES
"Assessment and Plan:     (J06.9) Upper respiratory tract infection, unspecified type  (primary encounter diagnosis)  Comment: onset over two weeks age and seemed to be getting better but symptoms have worsened in the last 4-5 days w/fever, bodyaches and sob, using rescue inhaler 4 times per day which helps, declined Rx for prednisone, has normal wob here today and appears well, has a young child but no known exposure to strep, covid negative at home x 3  Plan: XR Chest 2 Views, Streptococcus A Rapid Screen         w/Reflex to PCR - Clinic Collect, fluticasone         (FLOVENT HFA) 110 MCG/ACT inhaler        Continue symptomatic cares  Add flovent since she is not interested in prednisone due to side effects  CXR and strep test today  Discussed when to be seen promptly       Blanca Christensen PA-C      Juan Lopez is a 38 year old, presenting for the following health issues:  URI (Neg for covid)      HPI     Jessica is here for URI symptoms  She has had congestion and cough for 2.5 weeks  Since last week around 3-4 days ago, she had more body aches more shortness of breath and yesterday had a fever   She also has had more sore throat  She has been using her inhaler a lot which helps  She has had pain in her chest with coughing  She denies hemoptysis, nausea, abdominal pain  She tested for covid x 3 at home ane negative    Review of Systems   See above      Objective      /64 (BP Location: Left arm, Patient Position: Sitting, Cuff Size: Adult Regular)   Pulse 85   Temp 98.1  F (36.7  C) (Oral)   Resp 16   Ht 1.638 m (5' 4.5\")   Wt 69.4 kg (153 lb)   LMP  (LMP Unknown)   SpO2 94%   Breastfeeding No   BMI 25.86 kg/m        Physical Exam     EXAM:  GENERAL APPEARANCE: healthy, alert and no distress  EYES: Eyes grossly normal to inspection, conjunctivae and sclerae normal  HENT: ear canals and TMs normal, mouth without ulcers or lesions, oropharynx w/mild erythema, no exudate, no tonsillar " swelling  NECK: supple, no adenopathy, no asymmetry, masses  RESP: crackles right base o/w clear, normal WOB  CV: regular rates and rhythm, normal S1 S2, no S3 or S4 and no murmur, click or rub  EXT: no edema bilat lower ext

## 2023-12-26 NOTE — RESULT ENCOUNTER NOTE
Dax Lopez,    Here are your chest x-ray results which are negative for signs of pneumonia.     Please let us know if you have any questions or concerns.    Regards,  Blanca Christensen PA-C

## 2023-12-26 NOTE — RESULT ENCOUNTER NOTE
Dax Lopez,     Here are your rapid strep test results which are negative.     Please let us know if you have any questions or concerns.    Regards,  Blanca Christensen PA-C

## 2023-12-26 NOTE — TELEPHONE ENCOUNTER
----- Message from Saad Garza MD sent at 12/21/2023  3:57 PM CST -----  Regarding: Consult Completed  Dr. Clemons,    Patient is doing well with escitalopram, buspirone, and lorazepam. We tried mirtazapine and she has failed other sedatives. The insomnia within PMDD is quite difficult and recommended she maintain on lorazepam nightly long term. Post consult recommendations are at the end of my note. She is returning to you for care in 3 months. Thank you.    Sincerely,  Saad Garza M.D.  Consultative Psychiatrist  Program Medical Director, Lead  [facility]aborative Care Psychiatry Service

## 2023-12-26 NOTE — PATIENT INSTRUCTIONS
Get plenty of fluids and rest    Start controller inhaler today and continue albuterol as needed    Strep test    Chest x-ray today

## 2023-12-27 ENCOUNTER — TELEPHONE (OUTPATIENT)
Dept: FAMILY MEDICINE | Facility: CLINIC | Age: 38
End: 2023-12-27
Payer: COMMERCIAL

## 2023-12-27 DIAGNOSIS — J06.9 UPPER RESPIRATORY TRACT INFECTION, UNSPECIFIED TYPE: Primary | ICD-10-CM

## 2023-12-27 NOTE — RESULT ENCOUNTER NOTE
Hello -    Here are my comments about the recent results: strep PCR negative    Please let us know if you have any questions or concerns.    Regards,  Blanca Christensen PA-C

## 2023-12-27 NOTE — TELEPHONE ENCOUNTER
Fax received from pharmacy regarding Fluticasone 110mcg prescription. Pharmacy can no longer obtain bran name Flovent 110mcg and generic would need an alternative. Reviewed formulary for [employer] and preferred alternative medication would be Fluticasone diskus. Other alternatives that would be covered at Tier 2 would be Arnuity, Asmanex, and QVAR.    Jovan Bhatt, CMA on 12/27/2023 at 9:56 AM

## 2024-01-12 ENCOUNTER — VIRTUAL VISIT (OUTPATIENT)
Dept: PSYCHOLOGY | Facility: CLINIC | Age: 39
End: 2024-01-12
Payer: COMMERCIAL

## 2024-01-12 DIAGNOSIS — F32.81 PMDD (PREMENSTRUAL DYSPHORIC DISORDER): Primary | ICD-10-CM

## 2024-01-12 DIAGNOSIS — F41.1 GENERALIZED ANXIETY DISORDER: ICD-10-CM

## 2024-01-12 PROCEDURE — 90834 PSYTX W PT 45 MINUTES: CPT | Mod: 95 | Performed by: MARRIAGE & FAMILY THERAPIST

## 2024-01-12 NOTE — PROGRESS NOTES
[facility]                                     Progress Note    Patient Name: Petra Patel  Date: 1-12-24         Service Type: Individual      Session Start Time: 10am  Session End Time: 1050am     Session Length: 50min    Session #: 6    Attendees: Client attended alone    Service Modality:  Video    Telemedicine Visit: The patient's condition can be safely assessed and treated via synchronous audio and visual telemedicine encounter.      Reason for Telemedicine Visit: Patient has requested telehealth visit    Originating Site (Patient Location): Patient's home      Distant Location (provider location):  On-site    Consent:  The patient/guardian has verbally consented to: the potential risks and benefits of telemedicine (video visit) versus in person care; bill my insurance or make self-payment for services provided; and responsibility for payment of non-covered services.     Mode of Communication:  Video Conference via [employer]    As the provider I attest to compliance with applicable laws and regulations related to telemedicine.     Treatment plan- 1-12-24  PHQ and MICHELLE- 12-15-23  Promis- 12-14-23    DATA  Interactive Complexity: No  Crisis: No        Progress Since Last Session (Related to Symptoms / Goals / Homework):   Symptoms: Client has been struggling with PMDD and anger.  Working on relational issues at home.     Homework: Achieved / completed to satisfaction  Completed in session      Episode of Care Goals: Satisfactory progress - ACTION (Actively working towards change); Intervened by reinforcing change plan / affirming steps taken     Current / Ongoing Stressors and Concerns:   -History of PMDD   -Some stress around work environment and different personalities. 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    -Has been working on irritability with her daughter and recognizing that she is a kid with some challenges. She is a highly emotional kid.      -Working on managing anxiety and racing thoughts.    -Working on concrete medication plan.   -Daughter did start prozac and there has been a noticeable difference.    -Spouse is emotionally immature.    -Not catastrophizing as much and feeling she is taking a more problem solving approach to issues.    -Started college classes- Now has 3 classes again. Should be done in 2025 but does want to get a masters.    -A lot of trama around past friendship.      Treatment Objective(s) Addressed in This Session:   Patient will identify 5 ways to improve quality of life when she is struggling with PMDD  Patient will work on building coping skills around anxiety.    Anger management        Intervention:   Continue with medication management and use daily medication along with as needed anti anxiety medications.     Use anger management skills.     Be mindful about adding too much on.    Work on catching and identifying cognitive distortions.     Have good boundaries with spouse and keep communication at a baseline.    Work on college work while daughter is at dance.   Continue to look for better career options.     Assessments completed prior to visit:  The following assessments were completed by patient for this visit:  PHQ2:       12/26/2023     1:45 PM 11/9/2023     3:22 PM 2/23/2023     9:50 AM 4/6/2017     1:20 PM 3/21/2016     2:14 PM 6/22/2012     3:03 PM   PHQ-2 ( 1999 [employer])   Q1: Little interest or pleasure in doing things 0 1 2 0 0 2   Q2: Feeling down, depressed or hopeless 0 1 2 0 0 3   PHQ-2 Score 0 2 4 0 0 5   Q1: Little interest or pleasure in doing things  Several days More than half the days    More than half the days      Q2: Feeling down, depressed or hopeless  Several days More than half the days    More than half the days      PHQ-2 Score  2 4    4        PHQ9:       2/23/2023     9:50 AM 6/26/2023     8:49 AM 7/27/2023    12:31 PM 9/19/2023     1:54 PM 10/13/2023    12:00 PM 10/27/2023     9:45 AM  12/14/2023     9:08 AM   PHQ-9 SCORE   PHQ-9 Total Score Ginihart 17 (Moderately severe depression) 10 (Moderate depression) 10 (Moderate depression) 7 (Mild depression) 3 (Minimal depression) 10 (Moderate depression) 10 (Moderate depression)   PHQ-9 Total Score 17 10 10 7 3 10 10     GAD2:       8/10/2023     8:48 AM 9/19/2023     1:55 PM 10/5/2023     7:18 AM 10/6/2023     8:58 AM 10/27/2023     9:45 AM 11/9/2023    11:23 AM 12/14/2023     9:06 AM   MICHELLE-2   Feeling nervous, anxious, or on edge 1 3 1 1 1 2 1   Not being able to stop or control worrying 1 1 1 1 0 1 1   MICHELLE-2 Total Score 2 4 2 2 1 3 2    2     GAD7:       4/13/2017     2:13 PM 7/24/2023    11:32 AM 9/19/2023     1:55 PM 11/9/2023    11:23 AM   MICHELLE-7 SCORE   Total Score  19 (severe anxiety) 9 (mild anxiety) 10 (moderate anxiety)   Total Score 14 19 9 10     PROMIS 10-Global Health (all questions and answers displayed):       6/19/2023     5:53 PM 7/24/2023    11:33 AM 10/27/2023     9:46 AM 11/9/2023    11:24 AM 12/14/2023     9:09 AM   PROMIS 10   In general, would you say your health is: Very good Very good Very good Very good Good   In general, would you say your quality of life is: Good Good Very good Good Good   In general, how would you rate your physical health? Very good Good Very good Very good Good   In general, how would you rate your mental health, including your mood and your ability to think? Good Poor Good Fair Fair   In general, how would you rate your satisfaction with your social activities and relationships? Fair Fair Very good Good Good   In general, please rate how well you carry out your usual social activities and roles Fair Fair Very good Good Good   To what extent are you able to carry out your everyday physical activities such as walking, climbing stairs, carrying groceries, or moving a chair? Completely Completely Completely Completely Completely   In the past 7 days, how often have you been bothered by emotional problems  such as feeling anxious, depressed, or irritable? Sometimes Always Sometimes Often Sometimes   In the past 7 days, how would you rate your fatigue on average? Moderate Mild Severe Moderate Moderate   In the past 7 days, how would you rate your pain on average, where 0 means no pain, and 10 means worst imaginable pain? 0 0 0 0 0   In general, would you say your health is: 4 4 4 4 3   In general, would you say your quality of life is: 3 3 4 3 3   In general, how would you rate your physical health? 4 3 4 4 3   In general, how would you rate your mental health, including your mood and your ability to think? 3 1 3 2 2   In general, how would you rate your satisfaction with your social activities and relationships? 2 2 4 3 3   In general, please rate how well you carry out your usual social activities and roles. (This includes activities at home, at work and in your community, and responsibilities as a parent, child, spouse, employee, friend, etc.) 2 2 4 3 3   To what extent are you able to carry out your everyday physical activities such as walking, climbing stairs, carrying groceries, or moving a chair? 5 5 5 5 5   In the past 7 days, how often have you been bothered by emotional problems such as feeling anxious, depressed, or irritable? 3 5 3 4 3   In the past 7 days, how would you rate your fatigue on average? 3 2 4 3 3   In the past 7 days, how would you rate your pain on average, where 0 means no pain, and 10 means worst imaginable pain? 0 0 0 0 0   Global Mental Health Score 11 7 14 10 11   Global Physical Health Score 17 17 16 17 16   PROMIS TOTAL - SUBSCORES 28 24 30 27 27     PROMIS 10-Global Health (only subscores and total score):       6/19/2023     5:53 PM 7/24/2023    11:33 AM 10/27/2023     9:46 AM 11/9/2023    11:24 AM 12/14/2023     9:09 AM   PROMIS-10 Scores Only   Global Mental Health Score 11 7 14 10 11   Global Physical Health Score 17 17 16 17 16   PROMIS TOTAL - SUBSCORES 28 24 30 27 27      [place] Suicide Severity Rating Scale (Lifetime/Recent)       No data to display                  ASSESSMENT: Current Emotional / Mental Status (status of significant symptoms):   Risk status (Self / Other harm or suicidal ideation)   Patient denies current fears or concerns for personal safety.   Patient denies current or recent suicidal ideation or behaviors.   Patient denies current or recent homicidal ideation or behaviors.   Patient denies current or recent self injurious behavior or ideation.   Patient denies other safety concerns.   Patient reports there has been no change in risk factors since their last session.     Patient reports there has been no change in protective factors since their last session.     Recommended that patient call 911 or go to the local ED should there be a change in any of these risk factors.     Appearance:   Appropriate    Eye Contact:   Good    Psychomotor Behavior: Normal    Attitude:   Cooperative    Orientation:   All   Speech    Rate / Production: Normal     Volume:  Normal    Mood:    Anxious    Affect:    Appropriate    Thought Content:  Clear    Thought Form:  Coherent  Logical    Insight:    Good      Medication Review:   No changes to current psychiatric medication(s)     Medication Compliance:   Yes     Changes in Health Issues:   None reported     Chemical Use Review:   Substance Use: Chemical use reviewed, no active concerns identified      Tobacco Use: No current tobacco use.      Diagnosis:  300.02 (F41.1) Generalized Anxiety Disorder  PMDD F32.81    Collateral Reports Completed:   Not Applicable    PLAN: (Patient Tasks / Therapist Tasks / Other)  -Work on identifying and catching cognitive distortions.    -Practice anger management techniques.   -Continue with medication management and use as needed anti anxiety medications when experiencing panic.   -Do homework while daughter is at dance. Stay out of dance studio when able as this can be a trigger.   -Have  social time planned outside of the home.    -Have good boundaries around friendships especially in the work force.   -Be mindful about filling schedule too much.         Judie Kang, LMFT                                                         ______________________________________________________________________    Individual Treatment Plan    Patient's Name: Petra Patel  YOB: 1985    Date of Creation: 9-19-23  Date Treatment Plan Last Reviewed/Revised: 1-12-24    Diagnoses:  300.02 (F41.1) Generalized Anxiety Disorder  PMDD F32.81  Psychosocial & Contextual Factors: History of multiple traumas   PROMIS (reviewed every 90 days): 27    Referral / Collaboration:  Referral to another professional/service is not indicated at this time..    Anticipated number of session for this episode of care: 6-9 sessions  Anticipation frequency of session: Biweekly  Anticipated Duration of each session: 38-52 minutes  Treatment plan will be reviewed in 90 days or when goals have been changed.       MeasurableTreatment Goal(s) related to diagnosis / functional impairment(s)  Goal 1: Patient will address struggles with PMDD and anxiety     I will know I've met my goal when I am not having racing thoughts .      Objective #A (Patient Action)    Patient will use thought-stopping strategy daily to reduce intrusive thoughts.  Status: Continued - Date(s): 1-12-24    Intervention(s)  Therapist will use CBT and solution focused therapy.     Objective #B  Patient will work on identifying 5 ways to manage PMDD symptoms.    Status: Continued - Date(s): 1-12-24    Intervention(s)  Therapist will use CBT and solution focused therapy.     Objective #C  Patient will use at least 10 coping skills for anxiety management in the next 24 weeks.  Status: Continued - Date(s): 1-12-24    Intervention(s)  Therapist will use CBT and solution focused therapy           Patient has reviewed and agreed to the above plan.      Judie KONG  Jorge Luis [facility]  September 19, 2023

## 2024-01-22 ENCOUNTER — TELEPHONE (OUTPATIENT)
Dept: FAMILY MEDICINE | Facility: CLINIC | Age: 39
End: 2024-01-22
Payer: COMMERCIAL

## 2024-01-22 NOTE — TELEPHONE ENCOUNTER
Pt is requesting 5mg/5ml solution escitalopram. She needs this ASAP.     Please call patient if any issues.

## 2024-01-23 RX ORDER — ESCITALOPRAM OXALATE 5 MG/5ML
10 SOLUTION ORAL DAILY
Qty: 240 ML | Refills: 1 | Status: CANCELLED | OUTPATIENT
Start: 2024-01-23

## 2024-01-23 NOTE — TELEPHONE ENCOUNTER
I am not in the office today    Can she set up a TV for this?  Not sure what the dose she needs is and if for depression or anxiety or something else    Thanks    SN

## 2024-01-23 NOTE — TELEPHONE ENCOUNTER
"Patient refusing to schedule a visit with SN.  \"She should just give me the medication\".    Offered appointment again.    Still refusing.    Yuki Traore RN    "

## 2024-02-02 ENCOUNTER — VIRTUAL VISIT (OUTPATIENT)
Dept: PSYCHOLOGY | Facility: CLINIC | Age: 39
End: 2024-02-02
Payer: COMMERCIAL

## 2024-02-02 DIAGNOSIS — F32.81 PMDD (PREMENSTRUAL DYSPHORIC DISORDER): Primary | ICD-10-CM

## 2024-02-02 DIAGNOSIS — F41.1 GENERALIZED ANXIETY DISORDER: ICD-10-CM

## 2024-02-02 PROCEDURE — 90834 PSYTX W PT 45 MINUTES: CPT | Mod: 95 | Performed by: MARRIAGE & FAMILY THERAPIST

## 2024-02-02 ASSESSMENT — PATIENT HEALTH QUESTIONNAIRE - PHQ9
SUM OF ALL RESPONSES TO PHQ QUESTIONS 1-9: 7
SUM OF ALL RESPONSES TO PHQ QUESTIONS 1-9: 7
10. IF YOU CHECKED OFF ANY PROBLEMS, HOW DIFFICULT HAVE THESE PROBLEMS MADE IT FOR YOU TO DO YOUR WORK, TAKE CARE OF THINGS AT HOME, OR GET ALONG WITH OTHER PEOPLE: NOT DIFFICULT AT ALL
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
## 2024-03-01 ENCOUNTER — VIRTUAL VISIT (OUTPATIENT)
Dept: PSYCHOLOGY | Facility: CLINIC | Age: 39
End: 2024-03-01
Payer: COMMERCIAL

## 2024-03-01 DIAGNOSIS — F32.81 PMDD (PREMENSTRUAL DYSPHORIC DISORDER): Primary | ICD-10-CM

## 2024-03-01 DIAGNOSIS — F41.1 GENERALIZED ANXIETY DISORDER: ICD-10-CM

## 2024-03-01 PROCEDURE — 90834 PSYTX W PT 45 MINUTES: CPT | Mod: 95 | Performed by: MARRIAGE & FAMILY THERAPIST

## 2024-03-01 ASSESSMENT — ANXIETY QUESTIONNAIRES
GAD7 TOTAL SCORE: 11
8. IF YOU CHECKED OFF ANY PROBLEMS, HOW DIFFICULT HAVE THESE MADE IT FOR YOU TO DO YOUR WORK, TAKE CARE OF THINGS AT HOME, OR GET ALONG WITH OTHER PEOPLE?: SOMEWHAT DIFFICULT
7. FEELING AFRAID AS IF SOMETHING AWFUL MIGHT HAPPEN: SEVERAL DAYS
3. WORRYING TOO MUCH ABOUT DIFFERENT THINGS: MORE THAN HALF THE DAYS
4. TROUBLE RELAXING: MORE THAN HALF THE DAYS
1. FEELING NERVOUS, ANXIOUS, OR ON EDGE: MORE THAN HALF THE DAYS
5. BEING SO RESTLESS THAT IT IS HARD TO SIT STILL: SEVERAL DAYS
IF YOU CHECKED OFF ANY PROBLEMS ON THIS QUESTIONNAIRE, HOW DIFFICULT HAVE THESE PROBLEMS MADE IT FOR YOU TO DO YOUR WORK, TAKE CARE OF THINGS AT HOME, OR GET ALONG WITH OTHER PEOPLE: SOMEWHAT DIFFICULT
GAD7 TOTAL SCORE: 11
6. BECOMING EASILY ANNOYED OR IRRITABLE: MORE THAN HALF THE DAYS
GAD7 TOTAL SCORE: 11
7. FEELING AFRAID AS IF SOMETHING AWFUL MIGHT HAPPEN: SEVERAL DAYS
2. NOT BEING ABLE TO STOP OR CONTROL WORRYING: SEVERAL DAYS

## 2024-03-01 ASSESSMENT — PATIENT HEALTH QUESTIONNAIRE - PHQ9
SUM OF ALL RESPONSES TO PHQ QUESTIONS 1-9: 7
10. IF YOU CHECKED OFF ANY PROBLEMS, HOW DIFFICULT HAVE THESE PROBLEMS MADE IT FOR YOU TO DO YOUR WORK, TAKE CARE OF THINGS AT HOME, OR GET ALONG WITH OTHER PEOPLE: SOMEWHAT DIFFICULT
SUM OF ALL RESPONSES TO PHQ QUESTIONS 1-9: 7
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
## 2024-03-22 ENCOUNTER — OFFICE VISIT (OUTPATIENT)
Dept: FAMILY MEDICINE | Facility: CLINIC | Age: 39
End: 2024-03-22
Attending: PSYCHIATRY & NEUROLOGY
Payer: COMMERCIAL

## 2024-03-22 VITALS
WEIGHT: 147.5 LBS | HEIGHT: 65 IN | RESPIRATION RATE: 16 BRPM | DIASTOLIC BLOOD PRESSURE: 66 MMHG | OXYGEN SATURATION: 97 % | HEART RATE: 85 BPM | TEMPERATURE: 97.4 F | SYSTOLIC BLOOD PRESSURE: 97 MMHG | BODY MASS INDEX: 24.57 KG/M2

## 2024-03-22 DIAGNOSIS — F41.1 GENERALIZED ANXIETY DISORDER: ICD-10-CM

## 2024-03-22 DIAGNOSIS — F51.01 PRIMARY INSOMNIA: ICD-10-CM

## 2024-03-22 DIAGNOSIS — N92.6 IRREGULAR PERIODS: ICD-10-CM

## 2024-03-22 DIAGNOSIS — Z00.00 ROUTINE GENERAL MEDICAL EXAMINATION AT A HEALTH CARE FACILITY: Primary | ICD-10-CM

## 2024-03-22 LAB
CHOLEST SERPL-MCNC: 172 MG/DL
FASTING STATUS PATIENT QL REPORTED: NO
HBA1C MFR BLD: 5 % (ref 0–5.6)
HDLC SERPL-MCNC: 60 MG/DL
LDLC SERPL CALC-MCNC: 97 MG/DL
NONHDLC SERPL-MCNC: 112 MG/DL
T3FREE SERPL-MCNC: 3 PG/ML (ref 2–4.4)
T4 FREE SERPL-MCNC: 1.52 NG/DL (ref 0.9–1.7)
TRIGL SERPL-MCNC: 73 MG/DL
TSH SERPL DL<=0.005 MIU/L-ACNC: 0.83 UIU/ML (ref 0.3–4.2)

## 2024-03-22 PROCEDURE — 99395 PREV VISIT EST AGE 18-39: CPT | Performed by: FAMILY MEDICINE

## 2024-03-22 PROCEDURE — 84439 ASSAY OF FREE THYROXINE: CPT | Performed by: FAMILY MEDICINE

## 2024-03-22 PROCEDURE — 99213 OFFICE O/P EST LOW 20 MIN: CPT | Mod: 25 | Performed by: FAMILY MEDICINE

## 2024-03-22 PROCEDURE — 36415 COLL VENOUS BLD VENIPUNCTURE: CPT | Performed by: FAMILY MEDICINE

## 2024-03-22 PROCEDURE — 84443 ASSAY THYROID STIM HORMONE: CPT | Performed by: FAMILY MEDICINE

## 2024-03-22 PROCEDURE — 80061 LIPID PANEL: CPT | Performed by: FAMILY MEDICINE

## 2024-03-22 PROCEDURE — 83036 HEMOGLOBIN GLYCOSYLATED A1C: CPT | Performed by: FAMILY MEDICINE

## 2024-03-22 PROCEDURE — 84481 FREE ASSAY (FT-3): CPT | Performed by: FAMILY MEDICINE

## 2024-03-22 RX ORDER — BUSPIRONE HYDROCHLORIDE 5 MG/1
2.5 TABLET ORAL 2 TIMES DAILY
Qty: 90 TABLET | Refills: 3 | Status: SHIPPED | OUTPATIENT
Start: 2024-03-22

## 2024-03-22 RX ORDER — BUSPIRONE HYDROCHLORIDE 5 MG/1
2.5 TABLET ORAL 2 TIMES DAILY
Qty: 90 TABLET | Refills: 3 | Status: SHIPPED | OUTPATIENT
Start: 2024-03-22 | End: 2024-03-22

## 2024-03-22 RX ORDER — ESCITALOPRAM OXALATE 5 MG/5ML
1 SOLUTION ORAL DAILY
Qty: 30 ML | Refills: 11 | Status: SHIPPED | OUTPATIENT
Start: 2024-03-22 | End: 2024-04-16

## 2024-03-22 SDOH — HEALTH STABILITY: PHYSICAL HEALTH: ON AVERAGE, HOW MANY DAYS PER WEEK DO YOU ENGAGE IN MODERATE TO STRENUOUS EXERCISE (LIKE A BRISK WALK)?: 4 DAYS

## 2024-03-22 ASSESSMENT — ASTHMA QUESTIONNAIRES
QUESTION_5 LAST FOUR WEEKS HOW WOULD YOU RATE YOUR ASTHMA CONTROL: COMPLETELY CONTROLLED
ACT_TOTALSCORE: 25
ACT_TOTALSCORE: 25
QUESTION_3 LAST FOUR WEEKS HOW OFTEN DID YOUR ASTHMA SYMPTOMS (WHEEZING, COUGHING, SHORTNESS OF BREATH, CHEST TIGHTNESS OR PAIN) WAKE YOU UP AT NIGHT OR EARLIER THAN USUAL IN THE MORNING: NOT AT ALL
QUESTION_2 LAST FOUR WEEKS HOW OFTEN HAVE YOU HAD SHORTNESS OF BREATH: NOT AT ALL
QUESTION_4 LAST FOUR WEEKS HOW OFTEN HAVE YOU USED YOUR RESCUE INHALER OR NEBULIZER MEDICATION (SUCH AS ALBUTEROL): NOT AT ALL
QUESTION_1 LAST FOUR WEEKS HOW MUCH OF THE TIME DID YOUR ASTHMA KEEP YOU FROM GETTING AS MUCH DONE AT WORK, SCHOOL OR AT HOME: NONE OF THE TIME

## 2024-03-22 ASSESSMENT — ANXIETY QUESTIONNAIRES
7. FEELING AFRAID AS IF SOMETHING AWFUL MIGHT HAPPEN: NEARLY EVERY DAY
8. IF YOU CHECKED OFF ANY PROBLEMS, HOW DIFFICULT HAVE THESE MADE IT FOR YOU TO DO YOUR WORK, TAKE CARE OF THINGS AT HOME, OR GET ALONG WITH OTHER PEOPLE?: VERY DIFFICULT
GAD7 TOTAL SCORE: 19
GAD7 TOTAL SCORE: 19
4. TROUBLE RELAXING: NEARLY EVERY DAY
IF YOU CHECKED OFF ANY PROBLEMS ON THIS QUESTIONNAIRE, HOW DIFFICULT HAVE THESE PROBLEMS MADE IT FOR YOU TO DO YOUR WORK, TAKE CARE OF THINGS AT HOME, OR GET ALONG WITH OTHER PEOPLE: VERY DIFFICULT
3. WORRYING TOO MUCH ABOUT DIFFERENT THINGS: NEARLY EVERY DAY
6. BECOMING EASILY ANNOYED OR IRRITABLE: NEARLY EVERY DAY
5. BEING SO RESTLESS THAT IT IS HARD TO SIT STILL: SEVERAL DAYS
GAD7 TOTAL SCORE: 19
1. FEELING NERVOUS, ANXIOUS, OR ON EDGE: NEARLY EVERY DAY
2. NOT BEING ABLE TO STOP OR CONTROL WORRYING: NEARLY EVERY DAY
7. FEELING AFRAID AS IF SOMETHING AWFUL MIGHT HAPPEN: NEARLY EVERY DAY

## 2024-03-22 ASSESSMENT — PATIENT HEALTH QUESTIONNAIRE - PHQ9
SUM OF ALL RESPONSES TO PHQ QUESTIONS 1-9: 18
10. IF YOU CHECKED OFF ANY PROBLEMS, HOW DIFFICULT HAVE THESE PROBLEMS MADE IT FOR YOU TO DO YOUR WORK, TAKE CARE OF THINGS AT HOME, OR GET ALONG WITH OTHER PEOPLE: VERY DIFFICULT
SUM OF ALL RESPONSES TO PHQ QUESTIONS 1-9: 18

## 2024-03-22 ASSESSMENT — SOCIAL DETERMINANTS OF HEALTH (SDOH): HOW OFTEN DO YOU GET TOGETHER WITH FRIENDS OR RELATIVES?: NEVER

## 2024-03-22 ASSESSMENT — PAIN SCALES - GENERAL: PAINLEVEL: NO PAIN (0)

## 2024-03-22 NOTE — PATIENT INSTRUCTIONS
See me in 1 month as virtual visit to see how new dose of medication is working    Please go to the ER if this is significantly worsening or if you have concerns    Consider Rafa Hutton for cranial sacral therapy for the right sided pain      Preventive Care Advice   This is general advice given by our system to help you stay healthy. However, your care team may have specific advice just for you. Please talk to your care team about your preventive care needs.  Nutrition  Eat 5 or more servings of fruits and vegetables each day.  Try wheat bread, brown rice and whole grain pasta (instead of white bread, rice, and pasta).  Get enough calcium and vitamin D. Check the label on foods and aim for 100% of the RDA (recommended daily allowance).  Lifestyle  Exercise at least 150 minutes each week   (30 minutes a day, 5 days a week).  Do muscle strengthening activities 2 days a week. These help control your weight and prevent disease.  No smoking.  Wear sunscreen to prevent skin cancer.  Have a dental exam and cleaning every 6 months.  Yearly exams  See your health care team every year to talk about:  Any changes in your health.  Any medicines your care team has prescribed.  Preventive care, family planning, and ways to prevent chronic diseases.  Shots (vaccines)   HPV shots (up to age 26), if you've never had them before.  Hepatitis B shots (up to age 59), if you've never had them before.  COVID-19 shot: Get this shot when it's due.  Flu shot: Get a flu shot every year.  Tetanus shot: Get a tetanus shot every 10 years.  Pneumococcal, hepatitis A, and RSV shots: Ask your care team if you need these based on your risk.  Shingles shot (for age 50 and up).  General health tests  Diabetes screening:  Starting at age 35, Get screened for diabetes at least every 3 years.  If you are younger than age 35, ask your care team if you should be screened for diabetes.  Cholesterol test: At age 39, start having a cholesterol test  every 5 years, or more often if advised.  Bone density scan (DEXA): At age 50, ask your care team if you should have this scan for osteoporosis (brittle bones).  Hepatitis C: Get tested at least once in your life.  STIs (sexually transmitted infections)  Before age 24: Ask your care team if you should be screened for STIs.  After age 24: Get screened for STIs if you're at risk. You are at risk for STIs (including HIV) if:  You are sexually active with more than one person.  You don't use condoms every time.  You or a partner was diagnosed with a sexually transmitted infection.  If you are at risk for HIV, ask about PrEP medicine to prevent HIV.  Get tested for HIV at least once in your life, whether you are at risk for HIV or not.  Cancer screening tests  Cervical cancer screening: If you have a cervix, begin getting regular cervical cancer screening tests at age 21. Most people who have regular screenings with normal results can stop after age 65. Talk about this with your provider.  Breast cancer scan (mammogram): If you've ever had breasts, begin having regular mammograms starting at age 40. This is a scan to check for breast cancer.  Colon cancer screening: It is important to start screening for colon cancer at age 45.  Have a colonoscopy test every 10 years (or more often if you're at risk) Or, ask your provider about stool tests like a FIT test every year or Cologuard test every 3 years.  To learn more about your testing options, visit: https://[email or web address].  For help making a decision, visit: https://bit.ly/wx80594.  Prostate cancer screening test: If you have a prostate and are age 55 to 69, ask your provider if you would benefit from a yearly prostate cancer screening test.  Lung cancer screening: If you are a current or former smoker age 50 to 80, ask your care team if ongoing lung cancer screenings are right for you.  For informational purposes only. Not to replace the advice of your health  care provider. Copyright   2023 [facility]. All rights reserved. Clinically reviewed by the [facility] Transitions Program. SMARTworks 338024 - REV 01/24.    Relationships for Good Health  Relationships are important for our health and happiness. Social isolation, loneliness and lack of support are bad for your health. Studies show that loneliness can harm health and limit your life span as much as high blood pressure and smoking.   Take some time to reflect on your relationships. Then answer these questions:  Are there people in your life that cause you stress or drain your energy? What can you do to set limits?  ________________________________________________________________________________________________________________________________________________________________________________________________________________________________________________________________________________________________________________________________________________  Who do you enjoy spending time with? Who can you go to for support?  ________________________________________________________________________________________________________________________________________________________________________________________________________________________________________________________________________________________________________________________________________________  What can you do to improve your relationships with others?  __________________________________________________________________________________________________________________________________________________________________________________________________________________  ______________________________________________________________________________________________________________________________  What do you like most about your relationships with  others?  ________________________________________________________________________________________________________________________________________________________________________________________________________________________________________________________________________________________________________________________________________________  My goal: ______________________________________________________________________  I will ______________________________________________________________________________________________________________________________________________________________________________________________    For informational purposes only. Not to replace the advice of your health care provider. Copyright   2018 [facility]. All rights reserved. Clinically reviewed by [facility]  Team. Mascota[employer] 795057 - Rev 04/21.    Learning About Stress  What is stress?     Stress is your body's response to a hard situation. Your body can have a physical, emotional, or mental response. Stress is a fact of life for most people, and it affects everyone differently. What causes stress for you may not be stressful for someone else.  A lot of things can cause stress. You may feel stress when you go on a job interview, take a test, or run a race. This kind of short-term stress is normal and even useful. It can help you if you need to work hard or react quickly. For example, stress can help you finish an important job on time.  Long-term stress is caused by ongoing stressful situations or events. Examples of long-term stress include long-term health problems, ongoing problems at work, or conflicts in your family. Long-term stress can harm your health.  How does stress affect your health?  When you are stressed, your body responds as though you are in danger. It makes hormones that speed up your heart, make you breathe faster, and give you a burst of energy. This is called the fight-or-flight stress  response. If the stress is over quickly, your body goes back to normal and no harm is done.  But if stress happens too often or lasts too long, it can have bad effects. Long-term stress can make you more likely to get sick, and it can make symptoms of some diseases worse. If you tense up when you are stressed, you may develop neck, shoulder, or low back pain. Stress is linked to high blood pressure and heart disease.  Stress also harms your emotional health. It can make you shane, tense, or depressed. Your relationships may suffer, and you may not do well at work or school.  What can you do to manage stress?  You can try these things to help manage stress:   Do something active. Exercise or activity can help reduce stress. Walking is a great way to get started. Even everyday activities such as housecleaning or yard work can help.  Try yoga or ulises chi. These techniques combine exercise and meditation. You may need some training at first to learn them.  Do something you enjoy. For example, listen to music or go to a movie. Practice your hobby or do volunteer work.  Meditate. This can help you relax, because you are not worrying about what happened before or what may happen in the future.  Do guided imagery. Imagine yourself in any setting that helps you feel calm. You can use online videos, books, or a teacher to guide you.  Do breathing exercises. For example:  From a standing position, bend forward from the waist with your knees slightly bent. Let your arms dangle close to the floor.  Breathe in slowly and deeply as you return to a standing position. Roll up slowly and lift your head last.  Hold your breath for just a few seconds in the standing position.  Breathe out slowly and bend forward from the waist.  Let your feelings out. Talk, laugh, cry, and express anger when you need to. Talking with supportive friends or family, a counselor, or a tessa leader about your feelings is a healthy way to relieve stress.  "Avoid discussing your feelings with people who make you feel worse.  Write. It may help to write about things that are bothering you. This helps you find out how much stress you feel and what is causing it. When you know this, you can find better ways to cope.  What can you do to prevent stress?  You might try some of these things to help prevent stress:  Manage your time. This helps you find time to do the things you want and need to do.  Get enough sleep. Your body recovers from the stresses of the day while you are sleeping.  Get support. Your family, friends, and community can make a difference in how you experience stress.  Limit your news feed. Avoid or limit time on social media or news that may make you feel stressed.  Do something active. Exercise or activity can help reduce stress. Walking is a great way to get started.  Where can you learn more?  Go to https://www.Quwan.com.Sportsvite [employer] LeagueApps/patiented  Enter N032 in the search box to learn more about \"Learning About Stress.\"  Current as of: October 24, 2023               Content Version: 14.0    4408-7182 [employer].   Care instructions adapted under license by your healthcare professional. If you have questions about a medical condition or this instruction, always ask your healthcare professional. [employer] disclaims any warranty or liability for your use of this information.      Learning About Depression Screening  What is depression screening?  Depression screening is a way to see if you have depression symptoms. It may be done by a doctor or counselor. It's often part of a routine checkup. That's because your mental health is just as important as your physical health.  Depression is a mental health condition that affects how you feel, think, and act. You may:  Have less energy.  Lose interest in your daily activities.  Feel sad and grouchy for a long time.  Depression is very common. It affects people of all ages.  Many things can " "lead to depression. Some people become depressed after they have a stroke or find out they have a major illness like cancer or heart disease. The death of a loved one or a breakup may lead to depression. It can run in families. Most experts believe that a combination of inherited genes and stressful life events can cause it.  What happens during screening?  You may be asked to fill out a form about your depression symptoms. You and the doctor will discuss your answers. The doctor may ask you more questions to learn more about how you think, act, and feel.  What happens after screening?  If you have symptoms of depression, your doctor will talk to you about your options.  Doctors usually treat depression with medicines or counseling. Often, combining the two works best. Many people don't get help because they think that they'll get over the depression on their own. But people with depression may not get better unless they get treatment.  The cause of depression is not well understood. There may be many factors involved. But if you have depression, it's not your fault.  A serious symptom of depression is thinking about death or suicide. If you or someone you care about talks about this or about feeling hopeless, get help right away.  It's important to know that depression can be treated. Medicine, counseling, and self-care may help.  Where can you learn more?  Go to https://www.Sleep.FM.net/patiented  Enter T185 in the search box to learn more about \"Learning About Depression Screening.\"  Current as of: June 24, 2023               Content Version: 14.0    1228-3917 [employer].   Care instructions adapted under license by your healthcare professional. If you have questions about a medical condition or this instruction, always ask your healthcare professional. [employer] disclaims any warranty or liability for your use of this information.      "

## 2024-03-22 NOTE — PROGRESS NOTES
Preventive Care Visit  [facility]  [place], Family Medicine  Mar 22, 2024      Assessment & Plan     (Z00.00) Routine general medical examination at a health care facility  (primary encounter diagnosis)  Comment:   Plan: Lipid panel reflex to direct LDL Non-fasting,         Hemoglobin A1c        Fam his of CAD    (F41.1) Generalized anxiety disorder  Comment: would like to try higher dose - will slowly increase  Plan: escitalopram (LEXAPRO) 5 MG/5ML solution,         busPIRone (BUSPAR) 5 MG tablet, DISCONTINUED:         busPIRone (BUSPAR) 5 MG tablet        Has tolerated this in the past    (N92.6) Irregular periods  Comment:   Plan: TSH, T4, free, T3, Free            (F51.01) Primary insomnia  Comment:   Plan: Buspar helps with this    Patient has been advised of split billing requirements and indicates understanding: Yes  Prescription drug management        Depression Screening Follow Up  She works with therapist and sees them regularly    Lexapro has been very helpful - just feeling the need to try higher dose - pt reports being over extended              Follow Up Actions Taken  Ongoing therapy  Encouraged to let me know if symptoms are not improving  Counseling  Appropriate preventive services were discussed with this patient, including applicable screening as appropriate for fall prevention, nutrition, physical activity, Tobacco-use cessation, weight loss and cognition.  Checklist reviewing preventive services available has been given to the patient.  Reviewed patient's diet, addressing concerns and/or questions.   Patient is at risk for social isolation and has been provided with information about the benefit of social connection.   The patient's PHQ-9 score is consistent with moderate depression. She was provided with information regarding depression.       Follow up with me in 1 month to recheck how meds are working and mood    See me Sooner if any worsening or go to  the ER    Subjective   Jessica is a 38 year old, presenting for the following:  Physical        3/22/2024    10:17 AM   Additional Questions   Roomed by Bev COTTO   Accompanied by n/a        Health Care Directive  Patient does not have a Health Care Directive or Living Will:     HPI    Has PMDD  Feels she is not getting a break from this feels it is all the time  Has been on 0.5mg lexapro  Feels it is not covering mood symptoms int he last year    Spotting before her period of 7 days          3/22/2024   General Health   How would you rate your overall physical health? Good   Feel stress (tense, anxious, or unable to sleep) Very much   (!) STRESS CONCERN      3/22/2024   Nutrition   Three or more servings of calcium each day? Yes   Diet: Regular (no restrictions)   How many servings of fruit and vegetables per day? (!) 2-3   How many sweetened beverages each day? 0-1         3/22/2024   Exercise   Days per week of moderate/strenous exercise 4 days         3/22/2024   Social Factors   Frequency of gathering with friends or relatives Never   Worry food won't last until get money to buy more No   Food not last or not have enough money for food? No   Do you have housing?  Yes   Are you worried about losing your housing? No   Lack of transportation? No   Unable to get utilities (heat,electricity)? No   (!) SOCIAL CONNECTIONS CONCERN      3/22/2024   Dental   Dentist two times every year? Yes         3/22/2024   TB Screening   Were you born outside of the [place]? Yes       Today's PHQ-9 Score:       3/22/2024    10:15 AM   PHQ-9 SCORE   PHQ-9 Total Score MyChart 18 (Moderately severe depression)   PHQ-9 Total Score 18         3/22/2024   Substance Use   Alcohol more than 3/day or more than 7/wk Not Applicable   Do you use any other substances recreationally? No     Social History     Tobacco Use    Smoking status: Never    Smokeless tobacco: Never   Substance Use Topics    Alcohol use: Yes     Alcohol/week: 0.0 standard drinks  "of alcohol     Comment: social    Drug use: No             3/22/2024   Breast Cancer Screening   Family history of breast, colon, or ovarian cancer? No / Unknown                3/22/2024   One time HIV Screening   Previous HIV test? No         3/22/2024   STI Screening   New sexual partner(s) since last STI/HIV test? No     History of abnormal Pap smear: NO - age 30-65 PAP every 5 years with negative HPV co-testing recommended        Latest Ref Rng & Units 3/2/2023    11:24 AM 3/21/2016    12:00 AM 11/12/2012     5:17 PM   PAP / HPV   PAP  Negative for Intraepithelial Lesion or Malignancy (NILM)      PAP (Historical)   NIL  NIL    HPV 16 DNA Negative Negative      HPV 18 DNA Negative Negative      Other HR HPV Negative Negative              3/22/2024   Contraception/Family Planning   Questions about contraception or family planning No        Reviewed and updated as needed this visit by Provider   Tobacco  Allergies  Meds  Problems  Med Hx  Surg Hx  Fam Hx            Past Medical History:   Diagnosis Date    Anxiety     Asthma     Depressive disorder     LSIL (low grade squamous intraepithelial lesion) on Pap smear 09/01/2011    noncompliant with colp            Objective    Exam  BP 97/66 (BP Location: Right arm, Patient Position: Sitting, Cuff Size: Adult Large)   Pulse 85   Temp 97.4  F (36.3  C) (Temporal)   Resp 16   Ht 1.66 m (5' 5.35\")   Wt 66.9 kg (147 lb 8 oz)   LMP 03/07/2024 (Approximate)   SpO2 97%   BMI 24.28 kg/m     Estimated body mass index is 24.28 kg/m  as calculated from the following:    Height as of this encounter: 1.66 m (5' 5.35\").    Weight as of this encounter: 66.9 kg (147 lb 8 oz).    Physical Exam  GENERAL: alert and no distress  EYES: Eyes grossly normal to inspection, PERRL and conjunctivae and sclerae normal  HENT: ear canals and TM's normal, nose and mouth without ulcers or lesions  NECK: no adenopathy, no asymmetry, masses, or scars  RESP: lungs clear to auscultation - " no rales, rhonchi or wheezes  BREAST: normal without masses, tenderness or nipple discharge and no palpable axillary masses or adenopathy  CV: regular rate and rhythm, normal S1 S2, no S3 or S4, no murmur, click or rub, no peripheral edema  ABDOMEN: soft, nontender, no hepatosplenomegaly, no masses and bowel sounds normal  MS: no gross musculoskeletal defects noted, no edema  SKIN: no suspicious lesions or rashes  NEURO: Normal strength and tone, mentation intact and speech normal  PSYCH: mentation appears normal, affect normal/bright        Signed Electronically by: Isela Clemons MD    Answers submitted by the patient for this visit:  Patient Health Questionnaire (Submitted on 3/22/2024)  If you checked off any problems, how difficult have these problems made it for you to do your work, take care of things at home, or get along with other people?: Very difficult  PHQ9 TOTAL SCORE: 18  MICHELLE-7 (Submitted on 3/22/2024)  MICHELLE 7 TOTAL SCORE: 19

## 2024-03-29 ENCOUNTER — VIRTUAL VISIT (OUTPATIENT)
Dept: PSYCHOLOGY | Facility: CLINIC | Age: 39
End: 2024-03-29
Payer: COMMERCIAL

## 2024-03-29 DIAGNOSIS — F41.1 GENERALIZED ANXIETY DISORDER: ICD-10-CM

## 2024-03-29 DIAGNOSIS — F32.81 PMDD (PREMENSTRUAL DYSPHORIC DISORDER): Primary | ICD-10-CM

## 2024-03-29 PROCEDURE — 90834 PSYTX W PT 45 MINUTES: CPT | Mod: 95 | Performed by: MARRIAGE & FAMILY THERAPIST

## 2024-03-29 ASSESSMENT — PATIENT HEALTH QUESTIONNAIRE - PHQ9
SUM OF ALL RESPONSES TO PHQ QUESTIONS 1-9: 9
SUM OF ALL RESPONSES TO PHQ QUESTIONS 1-9: 9
10. IF YOU CHECKED OFF ANY PROBLEMS, HOW DIFFICULT HAVE THESE PROBLEMS MADE IT FOR YOU TO DO YOUR WORK, TAKE CARE OF THINGS AT HOME, OR GET ALONG WITH OTHER PEOPLE: SOMEWHAT DIFFICULT

## 2024-03-29 NOTE — PROGRESS NOTES
[facility]                                     Progress Note    Patient Name: Petra Patel  Date: 3-29-24         Service Type: Individual      Session Start Time: 1002am Session End Time: 1050am     Session Length: 48min    Session #: 9    Attendees: Client attended alone    Service Modality:  Video    Telemedicine Visit: The patient's condition can be safely assessed and treated via synchronous audio and visual telemedicine encounter.      Reason for Telemedicine Visit: Patient has requested telehealth visit    Originating Site (Patient Location): Patient's home      Distant Location (provider location):  On-site    Consent:  The patient/guardian has verbally consented to: the potential risks and benefits of telemedicine (video visit) versus in person care; bill my insurance or make self-payment for services provided; and responsibility for payment of non-covered services.     Mode of Communication:  Video Conference via [employer]    As the provider I attest to compliance with applicable laws and regulations related to telemedicine.     Treatment plan- 1-12-24  PHQ and MICHELLE- Completed at check in.    Promis- 3-29-24    DATA  Interactive Complexity: No  Crisis: No        Progress Since Last Session (Related to Symptoms / Goals / Homework):   Symptoms: Client has been struggling with PMDD and anger.  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       Homework: Achieved / completed to satisfaction  Completed in session      Episode of Care Goals: Satisfactory progress - ACTION (Actively working towards change); Intervened by reinforcing change plan / affirming steps taken     Current / Ongoing Stressors and Concerns:   -History of PMDD   -Enjoying new salon but adjusting to different personalities.    -Was able to get some accommodations for college class.   Was very proactive with this.    -Feels she has to be mindful about interaction with others in the salon. Has  chose to not engage with certain individuals.     -Accommodations for college class is not being met. Jessica is feeling hopeless about this. Feeling there is no organization or structure.  Group projects have been very challenging.    -Really focusing on what she is eating to help with symptoms.  Trying to avoid caffeine and limit sugar.   -Daughter is having success with prozac. Will be doing another year of  due to summer birthday. Suspicions of ADHD.   -Spouse is emotionally immature.  He will act like he is involved in things but then not follow through.    -A lot of trama around past friendship. Has felt bullied by friends in the past.      Treatment Objective(s) Addressed in This Session:   Patient will identify 5 ways to improve quality of life when she is struggling with PMDD  Patient will work on building coping skills around anxiety.    Relationships with different personalities   Career planning        Intervention:   Continue with medication management and use daily medication along with as needed anti anxiety medications.     Work on having personal boundaries with personality styles.    Think about another option for graduate school since accommodations were not taken seriously. Be mindful about professors that teach certain classes.    Work on catching and identifying cognitive distortions.     Have some short exchanges with spouse when both home together.   Work on exercise and nutrition routine without it being so restrictive.     Assessments completed prior to visit:  The following assessments were completed by patient for this visit:  PHQ2:       12/26/2023     1:45 PM 11/9/2023     3:22 PM 2/23/2023     9:50 AM 4/6/2017     1:20 PM 3/21/2016     2:14 PM 6/22/2012     3:03 PM   PHQ-2 ( 1999 [employer])   Q1: Little interest or pleasure in doing things 0 1 2 0 0 2   Q2: Feeling down, depressed or hopeless 0 1 2 0 0 3   PHQ-2 Score 0 2 4 0 0 5   Q1: Little interest or pleasure in doing things   Several days More than half the days    More than half the days      Q2: Feeling down, depressed or hopeless  Several days More than half the days    More than half the days      PHQ-2 Score  2 4    4        PHQ9:       10/13/2023    12:00 PM 10/27/2023     9:45 AM 12/14/2023     9:08 AM 2/2/2024     9:55 AM 3/1/2024     9:10 AM 3/22/2024    10:15 AM 3/29/2024     9:58 AM   PHQ-9 SCORE   PHQ-9 Total Score MyChart 3 (Minimal depression) 10 (Moderate depression) 10 (Moderate depression) 7 (Mild depression) 7 (Mild depression) 18 (Moderately severe depression) 9 (Mild depression)   PHQ-9 Total Score 3 10 10 7 7 18 9     GAD2:       10/6/2023     8:58 AM 10/27/2023     9:45 AM 11/9/2023    11:23 AM 12/14/2023     9:06 AM 2/2/2024     9:55 AM 3/1/2024     9:11 AM 3/29/2024     9:59 AM   MICHELLE-2   Feeling nervous, anxious, or on edge 1 1 2 1 1 2 2   Not being able to stop or control worrying 1 0 1 1 1 1 2   MICHELLE-2 Total Score 2 1 3 2    2 2 3 4     GAD7:       4/13/2017     2:13 PM 7/24/2023    11:32 AM 9/19/2023     1:55 PM 11/9/2023    11:23 AM 3/1/2024     9:11 AM 3/22/2024    10:16 AM   MICHELLE-7 SCORE   Total Score  19 (severe anxiety) 9 (mild anxiety) 10 (moderate anxiety) 11 (moderate anxiety) 19 (severe anxiety)   Total Score 14 19 9 10 11 19     PROMIS 10-Global Health (all questions and answers displayed):       7/24/2023    11:33 AM 10/27/2023     9:46 AM 11/9/2023    11:24 AM 12/14/2023     9:09 AM 2/2/2024     9:56 AM 3/1/2024     9:12 AM 3/29/2024     9:59 AM   PROMIS 10   In general, would you say your health is: Very good Very good Very good Good Good Good Very good   In general, would you say your quality of life is: Good Very good Good Good Good Good Very good   In general, how would you rate your physical health? Good Very good Very good Good Good Good Very good   In general, how would you rate your mental health, including your mood and your ability to think? Poor Good Fair Fair Good Fair Fair   In general,  how would you rate your satisfaction with your social activities and relationships? Fair Very good Good Good Very good Good Very good   In general, please rate how well you carry out your usual social activities and roles Fair Very good Good Good Very good Very good Very good   To what extent are you able to carry out your everyday physical activities such as walking, climbing stairs, carrying groceries, or moving a chair? Completely Completely Completely Completely Completely Completely Completely   In the past 7 days, how often have you been bothered by emotional problems such as feeling anxious, depressed, or irritable? Always Sometimes Often Sometimes Sometimes Often Often   In the past 7 days, how would you rate your fatigue on average? Mild Severe Moderate Moderate Moderate Moderate Moderate   In the past 7 days, how would you rate your pain on average, where 0 means no pain, and 10 means worst imaginable pain? 0 0 0 0 0 0 0   In general, would you say your health is: 4 4 4 3 3 3 4   In general, would you say your quality of life is: 3 4 3 3 3 3 4   In general, how would you rate your physical health? 3 4 4 3 3 3 4   In general, how would you rate your mental health, including your mood and your ability to think? 1 3 2 2 3 2 2   In general, how would you rate your satisfaction with your social activities and relationships? 2 4 3 3 4 3 4   In general, please rate how well you carry out your usual social activities and roles. (This includes activities at home, at work and in your community, and responsibilities as a parent, child, spouse, employee, friend, etc.) 2 4 3 3 4 4 4   To what extent are you able to carry out your everyday physical activities such as walking, climbing stairs, carrying groceries, or moving a chair? 5 5 5 5 5 5 5   In the past 7 days, how often have you been bothered by emotional problems such as feeling anxious, depressed, or irritable? 5 3 4 3 3 4 4   In the past 7 days, how would you  rate your fatigue on average? 2 4 3 3 3 3 3   In the past 7 days, how would you rate your pain on average, where 0 means no pain, and 10 means worst imaginable pain? 0 0 0 0 0 0 0   Global Mental Health Score 7 14 10 11 13 10 12   Global Physical Health Score 17 16 17 16 16 16 17   PROMIS TOTAL - SUBSCORES 24 30 27 27 29 26 29     PROMIS 10-Global Health (only subscores and total score):       7/24/2023    11:33 AM 10/27/2023     9:46 AM 11/9/2023    11:24 AM 12/14/2023     9:09 AM 2/2/2024     9:56 AM 3/1/2024     9:12 AM 3/29/2024     9:59 AM   PROMIS-10 Scores Only   Global Mental Health Score 7 14 10 11 13 10 12   Global Physical Health Score 17 16 17 16 16 16 17   PROMIS TOTAL - SUBSCORES 24 30 27 27 29 26 29     [place] Suicide Severity Rating Scale (Lifetime/Recent)       No data to display                  ASSESSMENT: Current Emotional / Mental Status (status of significant symptoms):   Risk status (Self / Other harm or suicidal ideation)   Patient denies current fears or concerns for personal safety.   Patient denies current or recent suicidal ideation or behaviors.     Patient denies current or recent homicidal ideation or behaviors.   Patient denies current or recent self injurious behavior or ideation.   Patient denies other safety concerns.   Patient reports there has been no change in risk factors since their last session.     Patient reports there has been no change in protective factors since their last session.     Recommended that patient call 911 or go to the local ED should there be a change in any of these risk factors.     Appearance:   Appropriate    Eye Contact:   Good    Psychomotor Behavior: Normal    Attitude:   Cooperative    Orientation:   All   Speech    Rate / Production: Normal     Volume:  Normal    Mood:    Anxious    Affect:    Worrisome    Thought Content:  Clear    Thought Form:  Coherent  Goal Directed  Logical    Insight:    Good      Medication Review:   Changes to  psychiatric medications, see updated Medication List in EPIC.      Medication Compliance:   Yes      Changes in Health Issues:   None reported     Chemical Use Review:   Substance Use: Chemical use reviewed, no active concerns identified      Tobacco Use: No current tobacco use.      Diagnosis:  300.02 (F41.1) Generalized Anxiety Disorder  PMDD F32.81    Collateral Reports Completed:   Not Applicable    PLAN: (Patient Tasks / Therapist Tasks / Other)  -Work on identifying and catching cognitive distortions.    -Practice anger management techniques.   -Have some boundaries with individuals in the workforce.   -Continue with higher dose of medication.   -Do homework while daughter is at dance. Stay out of dance studio when able as this can be a trigger.   -Be mindful about professors for certain classes.   -Have social time planned outside of the home.    -Be mindful about filling schedule too much.   -Have some plans for exercise and nutrition that are not super restrictive.         MARCIE Judge                                                         ______________________________________________________________________    Individual Treatment Plan    Patient's Name: Petra Patel  YOB: 1985    Date of Creation: 9-19-23  Date Treatment Plan Last Reviewed/Revised: 1-12-24    Diagnoses:  300.02 (F41.1) Generalized Anxiety Disorder  PMDD F32.81  Psychosocial & Contextual Factors: History of multiple traumas   PROMIS (reviewed every 90 days): 29    Referral / Collaboration:  Referral to another professional/service is not indicated at this time..    Anticipated number of session for this episode of care: 6-9 sessions  Anticipation frequency of session: Biweekly  Anticipated Duration of each session: 38-52 minutes  Treatment plan will be reviewed in 90 days or when goals have been changed.       MeasurableTreatment Goal(s) related to diagnosis / functional impairment(s)  Goal 1: Patient will  address struggles with PMDD and anxiety     I will know I've met my goal when I am not having racing thoughts .      Objective #A (Patient Action)    Patient will use thought-stopping strategy daily to reduce intrusive thoughts.  Status: Continued - Date(s): 1-12-24    Intervention(s)  Therapist will use CBT and solution focused therapy.     Objective #B  Patient will work on identifying 5 ways to manage PMDD symptoms.    Status: Continued - Date(s): 1-12-24    Intervention(s)  Therapist will use CBT and solution focused therapy.     Objective #C  Patient will use at least 10 coping skills for anxiety management in the next 24 weeks.  Status: Continued - Date(s): 1-12-24    Intervention(s)  Therapist will use CBT and solution focused therapy           Patient has reviewed and agreed to the above plan.      Judie Kang, MARCIE  September 19, 2023

## 2024-04-15 NOTE — PROGRESS NOTES
"  Disease State Management Encounter:                          Jessica Patel is a 38 year old female contacted via secure video for a follow-up visit.      Reason for visit: med check.    Anxiety/PMDD/insomnia:   -buspirone 2.5mg BID  -escitalopram 1mg daily  -lorazepam 0.5mg at bedtime prn    Escitalopram was recently increased from 0.5mg to 1mg about one month ago, which was helpful, but she noted that symptoms were previously quite severe. She noted that extreme anxiety (\"catastrophizing about everything\") was definitely much worse prior to her period, but never really improved, just was less bad.  She did have more significant stressors at home but these have improved.  General anxiety currently feels less severe, but could use improvement.  Pt stated that sleep initiation is much more difficult (not anxiety related) since escitalopram was increased, she's just feeling awake. She takes lorazepam about 9:30-10pm and falls asleep within 30 min usually, but sometimes now not until midnight.  She wakes up almost exactly 8 hours after taking the lorazepam dose, regardless of when she falls asleep.    Sometimes forgets to take 2nd busiprone dose at scheduled 2:30pm and may take at bedtime, but hasn't noticed any pattern with more difficulty with sleep initiation on those nights.    Buspirone seems to \"soften\" the anxiety, whereas escitalopram helps stop the constant rumination and peaks of anxiety throughout the day.  She acknowledges that her life is very busy and she \"needs to slow down,\" but does not see a path forward to make that happen.  She is very busy with work (\"constantly going\") and taking care of her daughter and family.    Assessment/Plan:    Adding escitalopram has been helpful, though recent dose increase has caused some difficulty with sleep initiation.  She prefers to improve daytime symptoms, so we will increase escitalopram very slightly from 1 mg to 1.5 mg daily and watch for excess stimulation.  " Since she has tried multiple sedative agents for sleep without benefit, psychiatry (Dr. Garza) had previously reviewed long term benzodiazepine use and noted that benefits outweigh risks at this point. She may benefit from a longer acting benzodiazepine for sleep, such as switching lorazepam to clonazepam. A switch had previously been proposed 5/2023, though she did not make this change as symptoms had improved somewhat before making the change.    -increase escitalopram from 1mg to 1.5mg    Follow-up: one month    I spent 15 minutes with this patient today. All changes were made via collaborative practice agreement with Isela Clemons MD. A copy of the visit note was provided to the patient's provider(s).    A summary of these recommendations was sent via clinic portal.    Chani Hernandez PharmD  Medication Therapy Management Pharmacist  [facility]       Medication Therapy Recommendations  Generalized anxiety disorder    Current Medication: escitalopram (LEXAPRO) 5 MG/5ML solution   Rationale: Dose too low - Dosage too low - Effectiveness   Recommendation: Increase Dose - from 1mg to 1.5mg daily   Status: Accepted per CPA         Insomnia, unspecified type    Current Medication: LORazepam (ATIVAN) 0.5 MG tablet   Rationale: More effective medication available - Ineffective medication - Effectiveness   Recommendation: Change Medication - consider switching to clonazepam   Status: Contact Provider - Awaiting Response

## 2024-04-16 ENCOUNTER — VIRTUAL VISIT (OUTPATIENT)
Dept: PHARMACY | Facility: CLINIC | Age: 39
End: 2024-04-16
Payer: COMMERCIAL

## 2024-04-16 DIAGNOSIS — F32.81 PMDD (PREMENSTRUAL DYSPHORIC DISORDER): Primary | ICD-10-CM

## 2024-04-16 DIAGNOSIS — F41.1 GENERALIZED ANXIETY DISORDER: ICD-10-CM

## 2024-04-16 DIAGNOSIS — G47.00 INSOMNIA, UNSPECIFIED TYPE: ICD-10-CM

## 2024-04-16 PROCEDURE — 99207 PR NO CHARGE LOS: CPT | Mod: 95 | Performed by: PHARMACIST

## 2024-04-16 RX ORDER — ESCITALOPRAM OXALATE 5 MG/5ML
1.5 SOLUTION ORAL DAILY
Qty: 45 ML | Refills: 11 | Status: SHIPPED | OUTPATIENT
Start: 2024-04-16 | End: 2024-09-25

## 2024-04-16 NOTE — Clinical Note
Hello- I met with this patient today to follow-up on anxiety and insomnia.  We increased escitalopram from 1 mg to 1.5 mg.  Last year around this time we tried switching lorazepam to clonazepam, but I do not think she ever ended up taking it.  Would you be willing to try this switch again? Thanks, Chani Hernandez, PharmD Medication Therapy Management Pharmacist [facility]

## 2024-04-16 NOTE — PATIENT INSTRUCTIONS
"Recommendations from today's MTM visit:                                                         -increase escitalopram from 1mg to 1.5mg. I did send a new prescription  -I will talk with Dr. Weiss about switching lorazepam to clonazepam and let you know    Follow-up: one month    It was great speaking with you today.  I value your experience and would be very thankful for your time in providing feedback in our clinic survey. In the next few days, you may receive an email or text message from [employer] with a link to a survey related to your  clinical pharmacist.\"     To schedule another MTM appointment, please call the clinic directly or you may call the MTM scheduling line at [phone].    My Clinical Pharmacist's contact information:                                                      Please feel free to contact me with any questions or concerns you have.      Chani Hernandez, PharmD  Medication Therapy Management Pharmacist  [facility]    "

## 2024-05-20 ENCOUNTER — OFFICE VISIT (OUTPATIENT)
Dept: FAMILY MEDICINE | Facility: CLINIC | Age: 39
End: 2024-05-20
Payer: COMMERCIAL

## 2024-05-20 VITALS
RESPIRATION RATE: 16 BRPM | HEART RATE: 87 BPM | OXYGEN SATURATION: 97 % | BODY MASS INDEX: 24.99 KG/M2 | WEIGHT: 150 LBS | TEMPERATURE: 98.2 F | HEIGHT: 65 IN | DIASTOLIC BLOOD PRESSURE: 68 MMHG | SYSTOLIC BLOOD PRESSURE: 103 MMHG

## 2024-05-20 DIAGNOSIS — R22.1 LUMP IN NECK: Primary | ICD-10-CM

## 2024-05-20 LAB
BASOPHILS # BLD AUTO: 0 10E3/UL (ref 0–0.2)
BASOPHILS NFR BLD AUTO: 1 %
EOSINOPHIL # BLD AUTO: 0.2 10E3/UL (ref 0–0.7)
EOSINOPHIL NFR BLD AUTO: 4 %
ERYTHROCYTE [DISTWIDTH] IN BLOOD BY AUTOMATED COUNT: 13 % (ref 10–15)
HCT VFR BLD AUTO: 38.2 % (ref 35–47)
HGB BLD-MCNC: 13.1 G/DL (ref 11.7–15.7)
IMM GRANULOCYTES # BLD: 0 10E3/UL
IMM GRANULOCYTES NFR BLD: 0 %
LYMPHOCYTES # BLD AUTO: 2 10E3/UL (ref 0.8–5.3)
LYMPHOCYTES NFR BLD AUTO: 38 %
MCH RBC QN AUTO: 29.7 PG (ref 26.5–33)
MCHC RBC AUTO-ENTMCNC: 34.3 G/DL (ref 31.5–36.5)
MCV RBC AUTO: 87 FL (ref 78–100)
MONOCYTES # BLD AUTO: 0.5 10E3/UL (ref 0–1.3)
MONOCYTES NFR BLD AUTO: 9 %
NEUTROPHILS # BLD AUTO: 2.6 10E3/UL (ref 1.6–8.3)
NEUTROPHILS NFR BLD AUTO: 49 %
PLATELET # BLD AUTO: 178 10E3/UL (ref 150–450)
RBC # BLD AUTO: 4.41 10E6/UL (ref 3.8–5.2)
WBC # BLD AUTO: 5.3 10E3/UL (ref 4–11)

## 2024-05-20 PROCEDURE — 99213 OFFICE O/P EST LOW 20 MIN: CPT | Performed by: PHYSICIAN ASSISTANT

## 2024-05-20 PROCEDURE — 36415 COLL VENOUS BLD VENIPUNCTURE: CPT | Performed by: PHYSICIAN ASSISTANT

## 2024-05-20 PROCEDURE — 85025 COMPLETE CBC W/AUTO DIFF WBC: CPT | Performed by: PHYSICIAN ASSISTANT

## 2024-05-20 ASSESSMENT — PATIENT HEALTH QUESTIONNAIRE - PHQ9
SUM OF ALL RESPONSES TO PHQ QUESTIONS 1-9: 3
10. IF YOU CHECKED OFF ANY PROBLEMS, HOW DIFFICULT HAVE THESE PROBLEMS MADE IT FOR YOU TO DO YOUR WORK, TAKE CARE OF THINGS AT HOME, OR GET ALONG WITH OTHER PEOPLE: NOT DIFFICULT AT ALL
SUM OF ALL RESPONSES TO PHQ QUESTIONS 1-9: 3

## 2024-05-20 ASSESSMENT — PAIN SCALES - GENERAL: PAINLEVEL: NO PAIN (0)

## 2024-05-20 NOTE — RESULT ENCOUNTER NOTE
Jessica,    Blood counts are all normal - normal hemoglobin/red blood cells (no anemia), normal white blood cells (infection fighting cells), normal platelets (affect ability to clot normally). Will let you know when ultrasound results are available.     Take care,  Bella Christian PA-C on 5/20/2024 at 7:31 AM

## 2024-05-20 NOTE — PROGRESS NOTES
"  Assessment & Plan     Lump in neck  Subtle <1 cm lump base of left neck x1 week, nontender. Has been more tired lately otherwise no symptoms or recent illness. CBC w diff within normal limits. Will get US for further evaluation.   - US Head Neck Soft Tissue  - CBC with platelets and differential    Bella Christian PA-C on 5/20/2024 at 7:09 AM        Juan Lopez is a 38 year old, presenting for the following health issues:  Mass (Enlarged lymph node by left side collar bone. Increasing in size, bean sized, has it for a week now. Denies discomfort, pain, and redness.)        5/20/2024     7:03 AM   Additional Questions   Roomed by Nenita BEY     History of Present Illness       Reason for visit:  Lump in neck  Symptom onset:  1-2 weeks ago  Symptom intensity:  Mild  Symptom progression:  Staying the same  Had these symptoms before:  No    She eats 2-3 servings of fruits and vegetables daily.She consumes 0 sweetened beverage(s) daily.She exercises with enough effort to increase her heart rate 30 to 60 minutes per day.  She exercises with enough effort to increase her heart rate 4 days per week.   She is taking medications regularly.     Lump left neck x1 week  Not painful  No recent illness  New probiotic that didn't agree with stomach, otherwise nothing has changed   Has been more tired lately       Review of Systems  Constitutional, HEENT, cardiovascular, pulmonary, gi and gu systems are negative, except as otherwise noted.      Objective    /68   Pulse 87   Temp 98.2  F (36.8  C) (Temporal)   Resp 16   Ht 1.652 m (5' 5.04\")   Wt 68 kg (150 lb)   LMP 04/07/2024 (Exact Date)   SpO2 97%   BMI 24.93 kg/m    Body mass index is 24.93 kg/m .  Physical Exam   GENERAL: alert and no distress  NECK: ~ 0.75-1 cm subtle lump palpated base of left neck, nontender  SKIN: no suspicious lesions or rashes  NEURO: Normal strength and tone, mentation intact and speech normal  PSYCH: mentation appears normal, " affect normal/bright        Signed Electronically by: Bella Christian PA-C on 5/20/2024 at 7:09 AM

## 2024-05-24 ENCOUNTER — ANCILLARY PROCEDURE (OUTPATIENT)
Dept: ULTRASOUND IMAGING | Facility: CLINIC | Age: 39
End: 2024-05-24
Attending: PHYSICIAN ASSISTANT
Payer: COMMERCIAL

## 2024-05-24 DIAGNOSIS — R22.1 LUMP IN NECK: ICD-10-CM

## 2024-05-24 PROCEDURE — 76536 US EXAM OF HEAD AND NECK: CPT

## 2024-06-18 ENCOUNTER — VIRTUAL VISIT (OUTPATIENT)
Dept: PHARMACY | Facility: CLINIC | Age: 39
End: 2024-06-18
Payer: COMMERCIAL

## 2024-06-18 DIAGNOSIS — F32.81 PMDD (PREMENSTRUAL DYSPHORIC DISORDER): ICD-10-CM

## 2024-06-18 DIAGNOSIS — G47.00 INSOMNIA, UNSPECIFIED TYPE: ICD-10-CM

## 2024-06-18 DIAGNOSIS — F41.1 GENERALIZED ANXIETY DISORDER: Primary | ICD-10-CM

## 2024-06-18 PROCEDURE — 99207 PR NO CHARGE LOS: CPT | Mod: 95 | Performed by: PHARMACIST

## 2024-06-18 NOTE — PATIENT INSTRUCTIONS
"Recommendations from today's MTM visit:                                                         Continue current medication.      Follow-up: 3-6 months or sooner if needed    It was great speaking with you today.  I value your experience and would be very thankful for your time in providing feedback in our clinic survey. In the next few days, you may receive an email or text message from [employer] with a link to a survey related to your  clinical pharmacist.\"     To schedule another MTM appointment, please call the clinic directly or you may call the MTM scheduling line at [phone] or toll-free at [phone].     My Clinical Pharmacist's contact information:                                                      Please feel free to contact me with any questions or concerns you have.      Chani Hernandez, PharmD  Medication Therapy Management Pharmacist  [facility]    "

## 2024-06-18 NOTE — PROGRESS NOTES
Disease State Management Encounter:                          Jessica Patel is a 39 year old female contacted via secure video for a follow-up visit.      Reason for visit: med check.    Anxiety/PMDD/insomnia:   -buspirone 2.5mg BID  -escitalopram 1.5mg daily  -lorazepam 0.5mg at bedtime prn    At last visit on 4/16, patient elected to increase escitalopram from 1mg to 1.5mg daily.  She had a lot of bloating and general GI discomfort for almost two months before resolved. Mood has improved overall; feels more even and stable. Anxiety has been less significant and sleep has also improved since moving the second buspirone dose to bedtime. Falling asleep much better and staying asleep. Getting around 7-8 hours per night, but more like 5-7 hours when close to menses. Feels stable.  She just filled her last refill of lorazepam and will need new prescription for July.     Assessment/Plan:     Improved overall, so will plan to continue current medication regimen.    Today's Vitals: LMP 04/07/2024 (Exact Date)     Follow-up: 3-6 months or sooner if needed    I spent 10 minutes with this patient today. A copy of the visit note was provided to the patient's provider(s).    A summary of these recommendations was sent via clinic portal.    Chain Hernandez PharmD  Medication Therapy Management Pharmacist  [facility]     Medication Therapy Recommendations  No medication therapy recommendations to display

## 2024-07-10 ENCOUNTER — FCC EXTENDED DOCUMENTATION (OUTPATIENT)
Dept: PSYCHOLOGY | Facility: CLINIC | Age: 39
End: 2024-07-10
Payer: COMMERCIAL

## 2024-07-10 NOTE — PROGRESS NOTES
Discharge Summary  Multiple Sessions    Client Name: Petra Patel MRN#: [id number] YOB: 1985      Intake / Discharge Date: Discharged on 7-10-24      DSM5 Diagnoses: (Sustained by DSM5 Criteria Listed Above)  300.02 (F41.1) Generalized Anxiety Disorder  PMDD F32.81        Presenting Concern:  PMDD  Depression  Anxiety       Reason for Discharge:  Stopped attending       Disposition at Time of Last Encounter:   Comments:   Was making progress in all goal areas      Risk Management:   Client denies a history of suicidal ideation, suicide attempts, self-injurious behavior, homicidal ideation, homicidal behavior, and and other safety concerns  Recommended that patient call 911 or go to the local ED should there be a change in any of these risk factors.      Referred To:  Patient may return if needing services in the future.         Judie Kang, MARCIE   7/10/2024

## 2024-07-11 ENCOUNTER — MYC REFILL (OUTPATIENT)
Dept: PSYCHIATRY | Facility: CLINIC | Age: 39
End: 2024-07-11
Payer: COMMERCIAL

## 2024-07-11 DIAGNOSIS — F41.1 GENERALIZED ANXIETY DISORDER: ICD-10-CM

## 2024-07-11 DIAGNOSIS — G47.00 INSOMNIA, UNSPECIFIED TYPE: ICD-10-CM

## 2024-07-11 RX ORDER — LORAZEPAM 0.5 MG/1
0.5 TABLET ORAL
Qty: 30 TABLET | Refills: 0 | Status: CANCELLED | OUTPATIENT
Start: 2024-07-11

## 2024-07-11 NOTE — TELEPHONE ENCOUNTER
Date of Last Office Visit: 12/21/2023  Date of Next Office Visit:  none, referred back to PCP on 12/21/2023  No shows since last visit: No  More than one patient-initiated cancellation (with reschedule) since last seen in clinic? No      Medication(s) requested:   -   lorazepam (ATIVAN) 0.5 MG tablet   Date last ordered: 12/21/2023  Qty: 30  Refills: 3  Take 1 tablet (0.5 mg) by mouth nightly as needed for sleep       Any Controlled Substance(s)? Yes    Requested medication(s) verified as identical to current order? Yes      Last visit treatment plan:   ASSESSMENT:   Patient with mood dysregulation, insomnia, anxiety and even at times has had suicidal ideation specifically in context of period of time 2 weeks preceding menses.  Buspirone with partial efficacy for PMDD, add fluoxetine-SSRIs are indicated.  Given serotonin syndrome concern, utilize [facility] for minimal dosage titration.  Insomnia stable on lorazepam; failed multiple sedative trials. Benefits outweigh risks of long-term benzodiazepine management.        Today Petra Patel reports no suicidal ideations. In addition, she has notable risk factors for self-harm, including anxiety, prior suicide attempts. However, risk is mitigated by commitment to family and history of seeking help when needed. Therefore, based on all available evidence including the factors cited above, she does not appear to be at imminent risk for self-harm, does not meet criteria for a 72-hr hold, and therefore remains appropriate for ongoing outpatient level of care.         PLAN:      Consult Completed, returned to PCP  Does not meet criteria for involuntary treatment or hospitalization  Add escitalopram 8/16/2023 0.5 mg daily compounded in liquid very effective for mood/anxiety, maybe too effective and too little anxiety, consider 0.4-Risks, benefits and alternatives discussed.  Patient provides verbal consent to treatment.  [facility]  [facility]ance on [facility].  Continue buspirone 2.5 mg p.o. twice daily partial efficacy-provides verbal consent to treatment   Backup - suvorexant  Lorazepam 0.25 mg nightly/0.5 mg nightly for PMDD episode => 10/5/23 0.25 mg po at bedtime => 0.5 mg po qhs-Risks, benefits and alternatives discussed.  Patient provides verbal consent to treatment.  Was advised of little bit of memory loss with long-term use.  Benefits significantly outweigh risks given suicidality, mood dysregulation and other concerns in context of bad side effects with other medications. Has to take lorazepam nightly due to rebound insomnia when med is needed during PMDD.  90 day supply provided  Dc'd fluoxetine (panic attacks, insomnia, wired), mirtazapine (weight gain, carb cravings)  Labs -reviewed, no further labs indicated at this time  Consultation continues with MAIDA Hernandez, LisyD  Therapy with TASH Kagn for PMDD  Sleep psychology - referred for Remedios Peters  Return to PCP in 3 months        RN forwarding to PCP for review.     Valerie Molina RN on 7/11/2024 at 11:23 AM

## 2024-07-11 NOTE — TELEPHONE ENCOUNTER
"PCP note says 11 month follow up, not done.  Needs visit (virtual probably fine) for refills  Thanks  Chani \"Malick\" RAPHAEL Thurston for Dr. Isela Clemons while she is out of office   "

## 2024-07-16 ENCOUNTER — VIRTUAL VISIT (OUTPATIENT)
Dept: FAMILY MEDICINE | Facility: CLINIC | Age: 39
End: 2024-07-16
Payer: COMMERCIAL

## 2024-07-16 DIAGNOSIS — G47.00 INSOMNIA, UNSPECIFIED TYPE: ICD-10-CM

## 2024-07-16 DIAGNOSIS — F33.1 MAJOR DEPRESSIVE DISORDER, RECURRENT, MODERATE (H): ICD-10-CM

## 2024-07-16 DIAGNOSIS — F41.1 GENERALIZED ANXIETY DISORDER: Primary | ICD-10-CM

## 2024-07-16 PROCEDURE — 99214 OFFICE O/P EST MOD 30 MIN: CPT | Mod: 95 | Performed by: FAMILY MEDICINE

## 2024-07-16 RX ORDER — LORAZEPAM 0.5 MG/1
0.5 TABLET ORAL
Qty: 30 TABLET | Refills: 2 | Status: SHIPPED | OUTPATIENT
Start: 2024-07-16 | End: 2024-09-25

## 2024-07-16 ASSESSMENT — ANXIETY QUESTIONNAIRES
8. IF YOU CHECKED OFF ANY PROBLEMS, HOW DIFFICULT HAVE THESE MADE IT FOR YOU TO DO YOUR WORK, TAKE CARE OF THINGS AT HOME, OR GET ALONG WITH OTHER PEOPLE?: NOT DIFFICULT AT ALL
GAD7 TOTAL SCORE: 6
4. TROUBLE RELAXING: SEVERAL DAYS
7. FEELING AFRAID AS IF SOMETHING AWFUL MIGHT HAPPEN: SEVERAL DAYS
GAD7 TOTAL SCORE: 6
6. BECOMING EASILY ANNOYED OR IRRITABLE: SEVERAL DAYS
1. FEELING NERVOUS, ANXIOUS, OR ON EDGE: SEVERAL DAYS
7. FEELING AFRAID AS IF SOMETHING AWFUL MIGHT HAPPEN: SEVERAL DAYS
GAD7 TOTAL SCORE: 6
2. NOT BEING ABLE TO STOP OR CONTROL WORRYING: SEVERAL DAYS
IF YOU CHECKED OFF ANY PROBLEMS ON THIS QUESTIONNAIRE, HOW DIFFICULT HAVE THESE PROBLEMS MADE IT FOR YOU TO DO YOUR WORK, TAKE CARE OF THINGS AT HOME, OR GET ALONG WITH OTHER PEOPLE: NOT DIFFICULT AT ALL
3. WORRYING TOO MUCH ABOUT DIFFERENT THINGS: SEVERAL DAYS
5. BEING SO RESTLESS THAT IT IS HARD TO SIT STILL: NOT AT ALL

## 2024-07-16 ASSESSMENT — PATIENT HEALTH QUESTIONNAIRE - PHQ9
10. IF YOU CHECKED OFF ANY PROBLEMS, HOW DIFFICULT HAVE THESE PROBLEMS MADE IT FOR YOU TO DO YOUR WORK, TAKE CARE OF THINGS AT HOME, OR GET ALONG WITH OTHER PEOPLE: NOT DIFFICULT AT ALL
SUM OF ALL RESPONSES TO PHQ QUESTIONS 1-9: 2
SUM OF ALL RESPONSES TO PHQ QUESTIONS 1-9: 2

## 2024-07-16 NOTE — PROGRESS NOTES
Jessica is a 39 year old who is being evaluated via a billable video visit.    How would you like to obtain your AVS? MyChart  If the video visit is dropped, the invitation should be resent by: Text to cell phone: [phone]  Will anyone else be joining your video visit? No      Assessment & Plan     Generalized anxiety disorder  She feels like anxiety symptoms are stable on low-dose Lexapro and buspirone.  She also takes lorazepam 0.5 mg at bedtime to help with anxiety and sleep.  She will continue to follow with [facility] pharmacy and she is planning to schedule follow-up with her PCP in 3 months.  - LORazepam (ATIVAN) 0.5 MG tablet; Take 1 tablet (0.5 mg) by mouth nightly as needed for sleep    Major depressive disorder, recurrent, moderate (H)  She feels like depression symptoms are stable on her current medications.    Insomnia, unspecified type  Feels like insomnia symptoms are stable on her current medications.  - LORazepam (ATIVAN) 0.5 MG tablet; Take 1 tablet (0.5 mg) by mouth nightly as needed for sleep                Subjective   Jessica is a 39 year old, presenting for the following health issues:  No chief complaint on file.        7/16/2024    11:56 AM   Additional Questions   Roomed by JS White     History of Present Illness       Reason for visit:  Need prescription refilled.    She eats 2-3 servings of fruits and vegetables daily.She consumes 0 sweetened beverage(s) daily.She exercises with enough effort to increase her heart rate 30 to 60 minutes per day.  She exercises with enough effort to increase her heart rate 4 days per week.   She is taking medications regularly.     She has a medical history significant for anxiety, depression and insomnia.  She feels like these issues are stable on Lexapro 1.5 mg daily, buspirone 2.5 mg twice daily and lorazepam 0.5 mg at bedtime.  She denies unwanted side effects.            Review of Systems  Constitutional, HEENT, cardiovascular, pulmonary, GI, ,  musculoskeletal, neuro, skin, endocrine and psych systems are negative, except as otherwise noted.      Objective           Vitals:  No vitals were obtained today due to virtual visit.    Physical Exam   GENERAL: alert and no distress  EYES: Eyes grossly normal to inspection.  No discharge or erythema, or obvious scleral/conjunctival abnormalities.  RESP: No audible wheeze, cough, or visible cyanosis.    SKIN: Visible skin clear. No significant rash, abnormal pigmentation or lesions.  NEURO: Cranial nerves grossly intact.  Mentation and speech appropriate for age.  PSYCH: Appropriate affect, tone, and pace of words          Video-Visit Details    Type of service:  Video Visit   Originating Location (pt. Location): Home    Distant Location (provider location):  On-site  Platform used for Video Visit: Brett  Signed Electronically by: Lorne White DO

## 2024-09-25 ENCOUNTER — VIRTUAL VISIT (OUTPATIENT)
Dept: FAMILY MEDICINE | Facility: CLINIC | Age: 39
End: 2024-09-25
Payer: COMMERCIAL

## 2024-09-25 DIAGNOSIS — F41.9 ANXIETY: Primary | ICD-10-CM

## 2024-09-25 DIAGNOSIS — F41.1 GENERALIZED ANXIETY DISORDER: ICD-10-CM

## 2024-09-25 DIAGNOSIS — G47.00 INSOMNIA, UNSPECIFIED TYPE: ICD-10-CM

## 2024-09-25 PROCEDURE — 99207 PR NO CHARGE LOS: CPT | Performed by: FAMILY MEDICINE

## 2024-09-25 RX ORDER — LORAZEPAM 0.5 MG/1
0.5 TABLET ORAL
Qty: 30 TABLET | Refills: 2 | Status: SHIPPED | OUTPATIENT
Start: 2024-09-25

## 2024-09-25 RX ORDER — ESCITALOPRAM OXALATE 5 MG/5ML
2 SOLUTION ORAL DAILY
Qty: 60 ML | Refills: 11 | Status: SHIPPED | OUTPATIENT
Start: 2024-09-25

## 2024-09-25 ASSESSMENT — ANXIETY QUESTIONNAIRES
1. FEELING NERVOUS, ANXIOUS, OR ON EDGE: NEARLY EVERY DAY
IF YOU CHECKED OFF ANY PROBLEMS ON THIS QUESTIONNAIRE, HOW DIFFICULT HAVE THESE PROBLEMS MADE IT FOR YOU TO DO YOUR WORK, TAKE CARE OF THINGS AT HOME, OR GET ALONG WITH OTHER PEOPLE: VERY DIFFICULT
3. WORRYING TOO MUCH ABOUT DIFFERENT THINGS: NEARLY EVERY DAY
7. FEELING AFRAID AS IF SOMETHING AWFUL MIGHT HAPPEN: NEARLY EVERY DAY
GAD7 TOTAL SCORE: 18
4. TROUBLE RELAXING: NEARLY EVERY DAY
2. NOT BEING ABLE TO STOP OR CONTROL WORRYING: NEARLY EVERY DAY
GAD7 TOTAL SCORE: 18
GAD7 TOTAL SCORE: 18
7. FEELING AFRAID AS IF SOMETHING AWFUL MIGHT HAPPEN: NEARLY EVERY DAY
8. IF YOU CHECKED OFF ANY PROBLEMS, HOW DIFFICULT HAVE THESE MADE IT FOR YOU TO DO YOUR WORK, TAKE CARE OF THINGS AT HOME, OR GET ALONG WITH OTHER PEOPLE?: VERY DIFFICULT
5. BEING SO RESTLESS THAT IT IS HARD TO SIT STILL: NOT AT ALL
6. BECOMING EASILY ANNOYED OR IRRITABLE: NEARLY EVERY DAY

## 2024-09-25 ASSESSMENT — ASTHMA QUESTIONNAIRES
ACT_TOTALSCORE: 25
QUESTION_1 LAST FOUR WEEKS HOW MUCH OF THE TIME DID YOUR ASTHMA KEEP YOU FROM GETTING AS MUCH DONE AT WORK, SCHOOL OR AT HOME: NONE OF THE TIME
ACT_TOTALSCORE: 25
QUESTION_3 LAST FOUR WEEKS HOW OFTEN DID YOUR ASTHMA SYMPTOMS (WHEEZING, COUGHING, SHORTNESS OF BREATH, CHEST TIGHTNESS OR PAIN) WAKE YOU UP AT NIGHT OR EARLIER THAN USUAL IN THE MORNING: NOT AT ALL
QUESTION_5 LAST FOUR WEEKS HOW WOULD YOU RATE YOUR ASTHMA CONTROL: COMPLETELY CONTROLLED
QUESTION_4 LAST FOUR WEEKS HOW OFTEN HAVE YOU USED YOUR RESCUE INHALER OR NEBULIZER MEDICATION (SUCH AS ALBUTEROL): NOT AT ALL
QUESTION_2 LAST FOUR WEEKS HOW OFTEN HAVE YOU HAD SHORTNESS OF BREATH: NOT AT ALL

## 2024-09-25 ASSESSMENT — PATIENT HEALTH QUESTIONNAIRE - PHQ9
SUM OF ALL RESPONSES TO PHQ QUESTIONS 1-9: 7
SUM OF ALL RESPONSES TO PHQ QUESTIONS 1-9: 7
10. IF YOU CHECKED OFF ANY PROBLEMS, HOW DIFFICULT HAVE THESE PROBLEMS MADE IT FOR YOU TO DO YOUR WORK, TAKE CARE OF THINGS AT HOME, OR GET ALONG WITH OTHER PEOPLE: SOMEWHAT DIFFICULT

## 2024-09-25 NOTE — PROGRESS NOTES
"Jessica is a 39 year old who is being evaluated via a billable video visit.    How would you like to obtain your AVS? MyChart  If the video visit is dropped, the invitation should be resent by: Send to e-mail at: [email or web address]  Will anyone else be joining your video visit? No  {If patient encounters technical issues they should call [phone] :645896}    {PROVIDER CHARTING PREFERENCE:796470}    Subjective   Jessica is a 39 year old, presenting for the following health issues:  No chief complaint on file.  {(!) Visit Details have not yet been documented.  Please enter Visit Details and then use this list to pull in documentation. (Optional):463874}  History of Present Illness       Mental Health Follow-up:  Patient presents to follow-up on Anxiety.    Patient's anxiety since last visit has been:  Worse  The patient is not having other symptoms associated with anxiety.  Any significant life events: other  Patient is feeling anxious or having panic attacks.  Patient has no concerns about alcohol or drug use.    She eats 4 or more servings of fruits and vegetables daily.She consumes 0 sweetened beverage(s) daily.She exercises with enough effort to increase her heart rate 20 to 29 minutes per day.  She exercises with enough effort to increase her heart rate 4 days per week.   She is taking medications regularly.       {SUPERLIST (Optional):002730}  {additonal problems for provider to add (Optional):291255}    {ROS Picklists (Optional):397995}      Objective           Vitals:  No vitals were obtained today due to virtual visit.    Physical Exam   {video visit exam brief selected:635898}    {Diagnostic Test Results (Optional):203480}      Video-Visit Details    Type of service:  Video Visit   Originating Location (pt. Location): {video visit patient location:462247::\"Home\"}  {PROVIDER LOCATION On-site should be selected for visits conducted from your clinic location or adjoining [facility], academic office, or " "other nearby [facility] building. Off-site should be selected for all other provider locations, including home:771745}  Distant Location (provider location):  {virtual location provider:302799}  Platform used for Video Visit: {Virtual Visit Platforms:664502::\"AlienVault\"}  Signed Electronically by: Isela Clemons MD  {Email feedback regarding this note to [email or web address]   :705059}  "

## 2024-10-29 ENCOUNTER — TELEPHONE (OUTPATIENT)
Dept: FAMILY MEDICINE | Facility: CLINIC | Age: 39
End: 2024-10-29
Payer: COMMERCIAL

## 2024-10-29 NOTE — TELEPHONE ENCOUNTER
[facility]  Fax:[phone]  Spec: [phone]  MO: [phone]

## 2024-10-30 NOTE — TELEPHONE ENCOUNTER
Prior Authorization Not Needed per Insurance    Medication: Escitalopram 5mg/5ml  Insurance Company: Comment:  [employer]  Expected CoPay:      Pharmacy Filling the Rx: [facility] - [place], [place] - [facility] [address]  Pharmacy Notified:  Yes  Patient Notified:  **Instructed pharmacy to notify patient when script is ready to /ship.**

## 2024-12-18 ENCOUNTER — OFFICE VISIT (OUTPATIENT)
Dept: URGENT CARE | Facility: URGENT CARE | Age: 39
End: 2024-12-18
Payer: COMMERCIAL

## 2024-12-18 VITALS
OXYGEN SATURATION: 98 % | SYSTOLIC BLOOD PRESSURE: 98 MMHG | DIASTOLIC BLOOD PRESSURE: 61 MMHG | WEIGHT: 158 LBS | RESPIRATION RATE: 16 BRPM | HEART RATE: 95 BPM | BODY MASS INDEX: 26.26 KG/M2 | TEMPERATURE: 100.3 F

## 2024-12-18 DIAGNOSIS — Z86.19 HISTORY OF CANDIDAL VULVOVAGINITIS: ICD-10-CM

## 2024-12-18 DIAGNOSIS — R50.9 FEVER AND CHILLS: ICD-10-CM

## 2024-12-18 DIAGNOSIS — H66.001 NON-RECURRENT ACUTE SUPPURATIVE OTITIS MEDIA OF RIGHT EAR WITHOUT SPONTANEOUS RUPTURE OF TYMPANIC MEMBRANE: Primary | ICD-10-CM

## 2024-12-18 DIAGNOSIS — J45.21 MILD INTERMITTENT ASTHMA WITH (ACUTE) EXACERBATION: ICD-10-CM

## 2024-12-18 DIAGNOSIS — R05.1 ACUTE COUGH: ICD-10-CM

## 2024-12-18 LAB
FLUAV AG SPEC QL IA: NEGATIVE
FLUBV AG SPEC QL IA: NEGATIVE
SARS-COV-2 RNA RESP QL NAA+PROBE: NEGATIVE

## 2024-12-18 PROCEDURE — 87635 SARS-COV-2 COVID-19 AMP PRB: CPT | Performed by: NURSE PRACTITIONER

## 2024-12-18 PROCEDURE — 87804 INFLUENZA ASSAY W/OPTIC: CPT | Performed by: NURSE PRACTITIONER

## 2024-12-18 PROCEDURE — 99214 OFFICE O/P EST MOD 30 MIN: CPT | Performed by: NURSE PRACTITIONER

## 2024-12-18 RX ORDER — FLUCONAZOLE 150 MG/1
150 TABLET ORAL ONCE
Qty: 1 TABLET | Refills: 0 | Status: SHIPPED | OUTPATIENT
Start: 2024-12-18 | End: 2024-12-18

## 2024-12-18 RX ORDER — AMOXICILLIN 500 MG/1
1000 CAPSULE ORAL 2 TIMES DAILY
Qty: 40 CAPSULE | Refills: 0 | Status: SHIPPED | OUTPATIENT
Start: 2024-12-18 | End: 2024-12-28

## 2024-12-18 NOTE — LETTER
December 18, 2024      Petra Patel  [address] [facility] [address]        To Whom It May Concern:    Petra Patel  was seen on 12/18/2024.  She has been ill for 2 weeks. Please excuse her absences.        Sincerely,        MIRNA PATE, CNP

## 2024-12-18 NOTE — PROGRESS NOTES
ICD-10-CM    1. Non-recurrent acute suppurative otitis media of right ear without spontaneous rupture of tympanic membrane  H66.001 amoxicillin (AMOXIL) 500 MG capsule      2. Fever and chills  R50.9 Influenza A & B Antigen - Clinic Collect      3. Acute cough  R05.1 COVID-19 Virus (Coronavirus) by PCR Nose      4. History of candidal vulvovaginitis  Z86.19 fluconazole (DIFLUCAN) 150 MG tablet      5. Mild intermittent asthma with (acute) exacerbation  J45.21       Patient is unable to take steroids due to adverse reactions so will not use these to treat her asthma exacerbation.  Instead we will have her restart her Flovent inhaler.  She will use albuterol as needed.  Antibiotics to treat otitis and Diflucan to take if she develops symptoms of yeast vaginitis.    Rest.  Fluids.  Delsym for cough suppression at night.  Mucinex as desired during the day.  Tylenol or ibuprofen as needed for fever or pain.  Recheck in 10 days if symptoms have not improved, sooner if they worsen.      Red flag warning signs and when to go to the emergency room discussed.  Reviewed potential adverse reactions to medications.    Labs:  Results for orders placed or performed in visit on 12/18/24 (from the past 24 hours)   Influenza A & B Antigen - Clinic Collect    Specimen: Nose; Swab   Result Value Ref Range    Influenza A antigen Negative Negative    Influenza B antigen Negative Negative    Narrative    Test results must be correlated with clinical data. If necessary, results should be confirmed by a molecular assay or viral culture.       SUBJECTIVE:   Petra Patel is a 39 year old female presenting with a chief complaint of   Chief Complaint   Patient presents with    Urgent Care    Cough     Pt reports dry cough and chest congestion onset 2 days   Fever 100.4 F onset today - Took tylenol two tablets this morning   Pt reports she was sick two weeks prior with similar got better and sx returning   Denies sore throat    Letter for  School/Work     Pt needs a note for school    Lost voice yesterday, symptoms started 2 weeks ago, worsened 3 days ago    Review of systems is negative except for as noted in the HPI.    OBJECTIVE  BP 98/61 (BP Location: Left arm, Patient Position: Sitting, Cuff Size: Adult Regular)   Pulse 95   Temp 100.3  F (37.9  C) (Oral)   Resp 16   Wt 71.7 kg (158 lb)   LMP 12/09/2024 (Exact Date)   SpO2 98%   Breastfeeding No   BMI 26.26 kg/m        GENERAL: Alert, mild distress  SKIN: skin is clear, no rash or abnormal pigmentation  HEAD: The head is normocephalic.   EYES: The eyes are normal. The conjunctivae and cornea normal.   NECK: The neck is supple and thyroid is normal, no masses; LYMPH NODES: No adenopathy  HENT: Right tympanic membrane is red, nasal passages have clear rhinorrhea, pharynx has mild erythema but no tonsillar hypertrophy  LUNGS: Expiratory wheezes  CV: Rhythm is regular. S1 and S2 are normal. No murmurs.  EXTREMITIES: Symmetric extremities no deformities    VENTURA Amezcua, CNP  [facility] Provider    The use of Dragon/Content Raven dictation services may have been used to construct the content in this note; any grammatical or spelling errors are non-intentional. Please contact the author of this note directly if you are in need of any clarification.

## 2025-01-02 ENCOUNTER — VIRTUAL VISIT (OUTPATIENT)
Dept: FAMILY MEDICINE | Facility: CLINIC | Age: 40
End: 2025-01-02
Payer: COMMERCIAL

## 2025-01-02 DIAGNOSIS — G47.00 INSOMNIA, UNSPECIFIED TYPE: ICD-10-CM

## 2025-01-02 DIAGNOSIS — F41.1 GENERALIZED ANXIETY DISORDER: ICD-10-CM

## 2025-01-02 PROCEDURE — 98005 SYNCH AUDIO-VIDEO EST LOW 20: CPT | Performed by: FAMILY MEDICINE

## 2025-01-02 RX ORDER — LORAZEPAM 0.5 MG/1
0.5 TABLET ORAL
Qty: 30 TABLET | Refills: 2 | Status: SHIPPED | OUTPATIENT
Start: 2025-01-02

## 2025-01-02 RX ORDER — ESCITALOPRAM OXALATE 5 MG/5ML
2 SOLUTION ORAL DAILY
Qty: 180 ML | Refills: 3 | Status: SHIPPED | OUTPATIENT
Start: 2025-01-02

## 2025-01-02 RX ORDER — BUSPIRONE HYDROCHLORIDE 5 MG/1
5 TABLET ORAL 2 TIMES DAILY
Qty: 180 TABLET | Refills: 3 | Status: SHIPPED | OUTPATIENT
Start: 2025-01-02

## 2025-01-02 ASSESSMENT — ANXIETY QUESTIONNAIRES
7. FEELING AFRAID AS IF SOMETHING AWFUL MIGHT HAPPEN: SEVERAL DAYS
GAD7 TOTAL SCORE: 9
4. TROUBLE RELAXING: SEVERAL DAYS
2. NOT BEING ABLE TO STOP OR CONTROL WORRYING: MORE THAN HALF THE DAYS
8. IF YOU CHECKED OFF ANY PROBLEMS, HOW DIFFICULT HAVE THESE MADE IT FOR YOU TO DO YOUR WORK, TAKE CARE OF THINGS AT HOME, OR GET ALONG WITH OTHER PEOPLE?: SOMEWHAT DIFFICULT
IF YOU CHECKED OFF ANY PROBLEMS ON THIS QUESTIONNAIRE, HOW DIFFICULT HAVE THESE PROBLEMS MADE IT FOR YOU TO DO YOUR WORK, TAKE CARE OF THINGS AT HOME, OR GET ALONG WITH OTHER PEOPLE: SOMEWHAT DIFFICULT
3. WORRYING TOO MUCH ABOUT DIFFERENT THINGS: SEVERAL DAYS
5. BEING SO RESTLESS THAT IT IS HARD TO SIT STILL: NOT AT ALL
7. FEELING AFRAID AS IF SOMETHING AWFUL MIGHT HAPPEN: SEVERAL DAYS
GAD7 TOTAL SCORE: 9
6. BECOMING EASILY ANNOYED OR IRRITABLE: NEARLY EVERY DAY
GAD7 TOTAL SCORE: 9
1. FEELING NERVOUS, ANXIOUS, OR ON EDGE: SEVERAL DAYS

## 2025-01-02 ASSESSMENT — PATIENT HEALTH QUESTIONNAIRE - PHQ9
SUM OF ALL RESPONSES TO PHQ QUESTIONS 1-9: 8
SUM OF ALL RESPONSES TO PHQ QUESTIONS 1-9: 8
10. IF YOU CHECKED OFF ANY PROBLEMS, HOW DIFFICULT HAVE THESE PROBLEMS MADE IT FOR YOU TO DO YOUR WORK, TAKE CARE OF THINGS AT HOME, OR GET ALONG WITH OTHER PEOPLE: SOMEWHAT DIFFICULT

## 2025-01-02 NOTE — PROGRESS NOTES
Jessica is a 39 year old who is being evaluated via a billable video visit.    How would you like to obtain your AVS? MyChart  If the video visit is dropped, the invitation should be resent by: Text to cell phone: [phone]  Will anyone else be joining your video visit? No      Assessment & Plan     (F41.1) Generalized anxiety disorder  Comment: Discussed options for medications does feel like Lexapro has worked the best has a hard time with medications and is using a tiny dose of 2.5 mg and needs a solution we will try to make sure that this is available to her she was told that she needed prior Auth or some clearance for this.  Discussed the role of BuSpar discussed options for period control to help with symptoms  Plan: busPIRone (BUSPAR) 5 MG tablet, LORazepam         (ATIVAN) 0.5 MG tablet, escitalopram (LEXAPRO)         5 MG/5ML solution            (G47.00) Insomnia, unspecified type  Comment: Okay for intermittent use of this only for significant anxiety discussed other medications such as hydroxyzine and has not had good success with these.  We discussed use of this medication and not using it regularly especially for sleep  Plan: LORazepam (ATIVAN) 0.5 MG tablet                        Subjective   Jessica is a 39 year old, presenting for the following health issues:  Refill Request        7/16/2024    11:56 AM   Additional Questions   Roomed by JS White     History of Present Illness       Reason for visit:  Med refill    She eats 2-3 servings of fruits and vegetables daily.She consumes 0 sweetened beverage(s) daily.She exercises with enough effort to increase her heart rate 30 to 60 minutes per day.  She exercises with enough effort to increase her heart rate 4 days per week.   She is taking medications regularly.     They are seeing a therapist  Life is stressful  Feels safe where she is living    Getting hr period soon so not doing well now  Has been on lexapro gained 10# with this  This has worked the  best  Periods are a big trigger for anxiety    Is nervous about stopping periods  Has had hives in the past    Feels 2 weeks of anxiety every month  Has tried IUD patch and orals  Nexplanon  All caused side effects            Review of Systems  Constitutional, HEENT, cardiovascular, pulmonary, gi and gu systems are negative, except as otherwise noted.      Objective    Vitals - Patient Reported  Systolic (Patient Reported):  (N/A)  Diastolic (Patient Reported):  (N/A)  Weight (Patient Reported):  (N/A)  Height (Patient Reported):  (N/A)  SpO2 (Patient Reported):  (N/A)  Temperature (Patient Reported):  (N/A)  Pulse (Patient Reported):  (N/A)        7/16/2024    11:52 AM 9/25/2024     5:19 PM 1/2/2025    10:19 AM   MICHELLE-7 SCORE   Total Score 6 (mild anxiety) 18 (severe anxiety) 9 (mild anxiety)   Total Score 6 18 9        Patient-reported             7/16/2024    11:51 AM 9/25/2024     5:18 PM 1/2/2025    10:18 AM   PHQ   PHQ-9 Total Score 2 7 8    Q9: Thoughts of better off dead/self-harm past 2 weeks Not at all Not at all Not at all       Patient-reported         Vitals:  No vitals were obtained today due to virtual visit.    Physical Exam   GENERAL: alert and no distress  EYES: Eyes grossly normal to inspection.  No discharge or erythema, or obvious scleral/conjunctival abnormalities.  RESP: No audible wheeze, cough, or visible cyanosis.    SKIN: Visible skin clear. No significant rash, abnormal pigmentation or lesions.  NEURO: Cranial nerves grossly intact.  Mentation and speech appropriate for age.  PSYCH: Appropriate affect, tone, and pace of words          Video-Visit Details    Type of service:  Video Visit   Originating Location (pt. Location): Home    Distant Location (provider location):  On-site  Platform used for Video Visit: Brett  Signed Electronically by: Isela Clemons MD

## 2025-02-20 ENCOUNTER — PATIENT OUTREACH (OUTPATIENT)
Dept: CARE COORDINATION | Facility: CLINIC | Age: 40
End: 2025-02-20
Payer: COMMERCIAL

## 2025-03-06 ENCOUNTER — PATIENT OUTREACH (OUTPATIENT)
Dept: CARE COORDINATION | Facility: CLINIC | Age: 40
End: 2025-03-06
Payer: COMMERCIAL

## 2025-03-24 ENCOUNTER — TELEPHONE (OUTPATIENT)
Dept: FAMILY MEDICINE | Facility: CLINIC | Age: 40
End: 2025-03-24
Payer: COMMERCIAL

## 2025-03-24 NOTE — TELEPHONE ENCOUNTER
Reason for Call:  Appointment Request    Patient requesting this type of appt: Chronic Diease Management/Medication/Follow-Up    Requested provider: Isela Clemons    Reason patient unable to be scheduled: Not within requested timeframe    When does patient want to be seen/preferred time:  tues or friday morning or thursday after noon  this week    Comments: Will run out of LORazepam (ATIVAN) 0.5 MG tablet  in 7 days and want video visit ASAP because PT states it is a controlled substance and she can't be without it or she could have a seizure    Could we send this information to you in [place] or would you prefer to receive a phone call?:   No preference   Okay to leave a detailed message?: Yes at Cell number on file:    Telephone Information:   Mobile [phone]       Call taken on 3/24/2025 at 11:13 AM by Edvin Mak

## 2025-03-24 NOTE — TELEPHONE ENCOUNTER
Called pt and assisted scheduling VV tomorrow at 0930.   Pt reports no health concerns at this time.   Judith ELLIOTT RN

## 2025-03-25 ENCOUNTER — VIRTUAL VISIT (OUTPATIENT)
Dept: FAMILY MEDICINE | Facility: CLINIC | Age: 40
End: 2025-03-25
Payer: COMMERCIAL

## 2025-03-25 DIAGNOSIS — F33.1 MAJOR DEPRESSIVE DISORDER, RECURRENT, MODERATE (H): ICD-10-CM

## 2025-03-25 DIAGNOSIS — G47.00 INSOMNIA, UNSPECIFIED TYPE: Primary | ICD-10-CM

## 2025-03-25 DIAGNOSIS — F41.1 GENERALIZED ANXIETY DISORDER: ICD-10-CM

## 2025-03-25 PROCEDURE — 98006 SYNCH AUDIO-VIDEO EST MOD 30: CPT | Performed by: PHYSICIAN ASSISTANT

## 2025-03-25 RX ORDER — LORAZEPAM 0.5 MG/1
0.5 TABLET ORAL
Qty: 30 TABLET | Refills: 2 | Status: SHIPPED | OUTPATIENT
Start: 2025-03-25

## 2025-03-25 ASSESSMENT — ANXIETY QUESTIONNAIRES
3. WORRYING TOO MUCH ABOUT DIFFERENT THINGS: MORE THAN HALF THE DAYS
2. NOT BEING ABLE TO STOP OR CONTROL WORRYING: NEARLY EVERY DAY
4. TROUBLE RELAXING: NEARLY EVERY DAY
GAD7 TOTAL SCORE: 13
IF YOU CHECKED OFF ANY PROBLEMS ON THIS QUESTIONNAIRE, HOW DIFFICULT HAVE THESE PROBLEMS MADE IT FOR YOU TO DO YOUR WORK, TAKE CARE OF THINGS AT HOME, OR GET ALONG WITH OTHER PEOPLE: SOMEWHAT DIFFICULT
6. BECOMING EASILY ANNOYED OR IRRITABLE: SEVERAL DAYS
5. BEING SO RESTLESS THAT IT IS HARD TO SIT STILL: NOT AT ALL
8. IF YOU CHECKED OFF ANY PROBLEMS, HOW DIFFICULT HAVE THESE MADE IT FOR YOU TO DO YOUR WORK, TAKE CARE OF THINGS AT HOME, OR GET ALONG WITH OTHER PEOPLE?: SOMEWHAT DIFFICULT
7. FEELING AFRAID AS IF SOMETHING AWFUL MIGHT HAPPEN: SEVERAL DAYS
GAD7 TOTAL SCORE: 13
GAD7 TOTAL SCORE: 13
7. FEELING AFRAID AS IF SOMETHING AWFUL MIGHT HAPPEN: SEVERAL DAYS
1. FEELING NERVOUS, ANXIOUS, OR ON EDGE: NEARLY EVERY DAY

## 2025-03-25 ASSESSMENT — ASTHMA QUESTIONNAIRES
QUESTION_5 LAST FOUR WEEKS HOW WOULD YOU RATE YOUR ASTHMA CONTROL: COMPLETELY CONTROLLED
ACT_TOTALSCORE: 25
QUESTION_4 LAST FOUR WEEKS HOW OFTEN HAVE YOU USED YOUR RESCUE INHALER OR NEBULIZER MEDICATION (SUCH AS ALBUTEROL): NOT AT ALL
QUESTION_1 LAST FOUR WEEKS HOW MUCH OF THE TIME DID YOUR ASTHMA KEEP YOU FROM GETTING AS MUCH DONE AT WORK, SCHOOL OR AT HOME: NONE OF THE TIME
QUESTION_3 LAST FOUR WEEKS HOW OFTEN DID YOUR ASTHMA SYMPTOMS (WHEEZING, COUGHING, SHORTNESS OF BREATH, CHEST TIGHTNESS OR PAIN) WAKE YOU UP AT NIGHT OR EARLIER THAN USUAL IN THE MORNING: NOT AT ALL
QUESTION_2 LAST FOUR WEEKS HOW OFTEN HAVE YOU HAD SHORTNESS OF BREATH: NOT AT ALL

## 2025-03-25 ASSESSMENT — PATIENT HEALTH QUESTIONNAIRE - PHQ9
SUM OF ALL RESPONSES TO PHQ QUESTIONS 1-9: 5
SUM OF ALL RESPONSES TO PHQ QUESTIONS 1-9: 5
10. IF YOU CHECKED OFF ANY PROBLEMS, HOW DIFFICULT HAVE THESE PROBLEMS MADE IT FOR YOU TO DO YOUR WORK, TAKE CARE OF THINGS AT HOME, OR GET ALONG WITH OTHER PEOPLE: NOT DIFFICULT AT ALL

## 2025-03-25 NOTE — PROGRESS NOTES
Jessica is a 39 year old who is being evaluated via a billable video visit.    How would you like to obtain your AVS? MyChart  If the video visit is dropped, the invitation should be resent by: Text to cell phone: [phone]  Will anyone else be joining your video visit? No      Assessment & Plan     Insomnia, unspecified type  Has tried numerous other meds that have either not worked or given side effects.  While long term benzo is not ideal, she has been very responsible.  Does not take with opioids or alcohol.  PDMP reviewed.  Refilled, to follow up with PCP  - LORazepam (ATIVAN) 0.5 MG tablet; Take 1 tablet (0.5 mg) by mouth nightly as needed for sleep.    Generalized anxiety disorder  As above.  PCP did bump up buspar in Jan, and she does think that it has been helping.  Declines any adjustments.  - LORazepam (ATIVAN) 0.5 MG tablet; Take 1 tablet (0.5 mg) by mouth nightly as needed for sleep.    Major depressive disorder, recurrent, moderate (H)  Overall doing well.                Subjective   Jessica is a 39 year old, presenting for the following health issues:  Recheck Medication (Lorazepam)        3/25/2025     9:07 AM   Additional Questions   Roomed by Yumiko WEINSTEIN     History of Present Illness       Reason for visit:  Prescription refill    She eats 2-3 servings of fruits and vegetables daily.She consumes 1 sweetened beverage(s) daily.She exercises with enough effort to increase her heart rate 20 to 29 minutes per day.  She exercises with enough effort to increase her heart rate 4 days per week.   She is taking medications regularly.                Review of Systems  Constitutional, HEENT, cardiovascular, pulmonary, gi and gu systems are negative, except as otherwise noted.      Objective           Vitals:  No vitals were obtained today due to virtual visit.    Physical Exam   GENERAL: alert and no distress  EYES: Eyes grossly normal to inspection.  No discharge or erythema, or obvious scleral/conjunctival  abnormalities.  RESP: No audible wheeze, cough, or visible cyanosis.    SKIN: Visible skin clear. No significant rash, abnormal pigmentation or lesions.  NEURO: Cranial nerves grossly intact.  Mentation and speech appropriate for age.  PSYCH: Appropriate affect, tone, and pace of words          Video-Visit Details    Type of service:  Video Visit   Originating Location (pt. Location): Home    Distant Location (provider location):  On-site  Platform used for Video Visit: [facility]  Signed Electronically by: Josemanuel Amato PA-C

## 2025-05-11 ENCOUNTER — HEALTH MAINTENANCE LETTER (OUTPATIENT)
Age: 40
End: 2025-05-11

## 2025-06-18 ENCOUNTER — VIRTUAL VISIT (OUTPATIENT)
Dept: FAMILY MEDICINE | Facility: CLINIC | Age: 40
End: 2025-06-18
Payer: COMMERCIAL

## 2025-06-18 DIAGNOSIS — F41.1 GENERALIZED ANXIETY DISORDER: ICD-10-CM

## 2025-06-18 DIAGNOSIS — G47.00 INSOMNIA, UNSPECIFIED TYPE: ICD-10-CM

## 2025-06-18 DIAGNOSIS — F51.04 PSYCHOPHYSIOLOGICAL INSOMNIA: Primary | ICD-10-CM

## 2025-06-18 PROCEDURE — 98006 SYNCH AUDIO-VIDEO EST MOD 30: CPT | Performed by: FAMILY MEDICINE

## 2025-06-18 RX ORDER — LORAZEPAM 0.5 MG/1
0.5 TABLET ORAL
Qty: 30 TABLET | Refills: 2 | Status: SHIPPED | OUTPATIENT
Start: 2025-06-18

## 2025-06-18 RX ORDER — ESCITALOPRAM OXALATE 5 MG/5ML
2 SOLUTION ORAL DAILY
Qty: 180 ML | Refills: 3 | Status: SHIPPED | OUTPATIENT
Start: 2025-06-18

## 2025-06-18 ASSESSMENT — PATIENT HEALTH QUESTIONNAIRE - PHQ9
SUM OF ALL RESPONSES TO PHQ QUESTIONS 1-9: 5
10. IF YOU CHECKED OFF ANY PROBLEMS, HOW DIFFICULT HAVE THESE PROBLEMS MADE IT FOR YOU TO DO YOUR WORK, TAKE CARE OF THINGS AT HOME, OR GET ALONG WITH OTHER PEOPLE: NOT DIFFICULT AT ALL
SUM OF ALL RESPONSES TO PHQ QUESTIONS 1-9: 5

## 2025-06-18 ASSESSMENT — ANXIETY QUESTIONNAIRES
3. WORRYING TOO MUCH ABOUT DIFFERENT THINGS: SEVERAL DAYS
GAD7 TOTAL SCORE: 8
7. FEELING AFRAID AS IF SOMETHING AWFUL MIGHT HAPPEN: SEVERAL DAYS
5. BEING SO RESTLESS THAT IT IS HARD TO SIT STILL: SEVERAL DAYS
2. NOT BEING ABLE TO STOP OR CONTROL WORRYING: SEVERAL DAYS
GAD7 TOTAL SCORE: 8
8. IF YOU CHECKED OFF ANY PROBLEMS, HOW DIFFICULT HAVE THESE MADE IT FOR YOU TO DO YOUR WORK, TAKE CARE OF THINGS AT HOME, OR GET ALONG WITH OTHER PEOPLE?: NOT DIFFICULT AT ALL
IF YOU CHECKED OFF ANY PROBLEMS ON THIS QUESTIONNAIRE, HOW DIFFICULT HAVE THESE PROBLEMS MADE IT FOR YOU TO DO YOUR WORK, TAKE CARE OF THINGS AT HOME, OR GET ALONG WITH OTHER PEOPLE: NOT DIFFICULT AT ALL
4. TROUBLE RELAXING: SEVERAL DAYS
6. BECOMING EASILY ANNOYED OR IRRITABLE: MORE THAN HALF THE DAYS
GAD7 TOTAL SCORE: 8
7. FEELING AFRAID AS IF SOMETHING AWFUL MIGHT HAPPEN: SEVERAL DAYS
1. FEELING NERVOUS, ANXIOUS, OR ON EDGE: SEVERAL DAYS

## 2025-06-18 NOTE — PROGRESS NOTES
Jessica is a 40 year old who is being evaluated via a billable video visit.    How would you like to obtain your AVS? MyChart  If the video visit is dropped, the invitation should be resent by: Text to cell phone: [phone]  Will anyone else be joining your video visit? No      Assessment & Plan     Generalized anxiety disorder    - escitalopram (LEXAPRO) 5 MG/5ML solution; Take 2 mLs (2 mg) by mouth daily.  - LORazepam (ATIVAN) 0.5 MG tablet; Take 1 tablet (0.5 mg) by mouth nightly as needed for sleep.    Insomnia, unspecified type    - LORazepam (ATIVAN) 0.5 MG tablet; Take 1 tablet (0.5 mg) by mouth nightly as needed for sleep.    Psychophysiological insomnia  Has had best response to this medication has worked with psychiatry on this                Subjective   Jessica is a 40 year old, presenting for the following health issues:  No chief complaint on file.    History of Present Illness       Mental Health Follow-up:  Patient presents to follow-up on Anxiety.    Patient's anxiety since last visit has been:  Better  The patient is not having other symptoms associated with anxiety.  Any significant life events: relationship concerns, financial concerns and housing concerns  Patient is feeling anxious or having panic attacks.  Patient has no concerns about alcohol or drug use.    She eats 2-3 servings of fruits and vegetables daily.She consumes 0 sweetened beverage(s) daily.She exercises with enough effort to increase her heart rate 30 to 60 minutes per day.  She exercises with enough effort to increase her heart rate 6 days per week.   She is taking medications regularly.      Has ebbs of extremely anxious but can calm herself down  She feels ability to pause thoughts    Taking buspar 5     Has noted some weight gain with the lexapro  Is working out really hard to get to weight loss    Has had panic attacks  Has been on ativan at night due to PMDD and suiicidal thoughts  Has been able to reduce the dose  Really  helps with sleep  Has seen psychiatry a while ago  Tried several meds and    Wt Readings from Last 4 Encounters:   12/18/24 71.7 kg (158 lb)   05/20/24 68 kg (150 lb)   03/22/24 66.9 kg (147 lb 8 oz)   12/26/23 69.4 kg (153 lb)    Losing 1 # per week              Objective           Vitals:  No vitals were obtained today due to virtual visit.    Physical Exam   GENERAL: alert and no distress  EYES: Eyes grossly normal to inspection.  No discharge or erythema, or obvious scleral/conjunctival abnormalities.  RESP: No audible wheeze, cough, or visible cyanosis.    SKIN: Visible skin clear. No significant rash, abnormal pigmentation or lesions.  NEURO: Cranial nerves grossly intact.  Mentation and speech appropriate for age.  PSYCH: Appropriate affect, tone, and pace of words          Video-Visit Details  Joined the call at 6/18/2025, 12:02:30 pm.  Left the call at 6/18/2025, 12:12:05 pm.  You were on the call for 9 minutes 34 seconds  Type of service:  Video Visit   Originating Location (pt. Location): Other work    Distant Location (provider location):  On-site  Platform used for Video Visit: Brett  Signed Electronically by: Isela Clemons MD

## 2025-08-03 ENCOUNTER — OFFICE VISIT (OUTPATIENT)
Dept: URGENT CARE | Facility: URGENT CARE | Age: 40
End: 2025-08-03
Payer: COMMERCIAL

## 2025-08-03 VITALS
BODY MASS INDEX: 24.83 KG/M2 | TEMPERATURE: 98.7 F | RESPIRATION RATE: 16 BRPM | DIASTOLIC BLOOD PRESSURE: 70 MMHG | HEIGHT: 65 IN | HEART RATE: 65 BPM | SYSTOLIC BLOOD PRESSURE: 108 MMHG | OXYGEN SATURATION: 99 % | WEIGHT: 149 LBS

## 2025-08-03 DIAGNOSIS — H04.203 WATERY EYES: Primary | ICD-10-CM

## 2025-08-03 PROCEDURE — 3074F SYST BP LT 130 MM HG: CPT | Performed by: FAMILY MEDICINE

## 2025-08-03 PROCEDURE — 99213 OFFICE O/P EST LOW 20 MIN: CPT | Performed by: FAMILY MEDICINE

## 2025-08-03 PROCEDURE — 3078F DIAST BP <80 MM HG: CPT | Performed by: FAMILY MEDICINE
